# Patient Record
Sex: FEMALE | Race: BLACK OR AFRICAN AMERICAN | NOT HISPANIC OR LATINO | Employment: OTHER | URBAN - METROPOLITAN AREA
[De-identification: names, ages, dates, MRNs, and addresses within clinical notes are randomized per-mention and may not be internally consistent; named-entity substitution may affect disease eponyms.]

---

## 2020-12-09 ENCOUNTER — HOSPITAL ENCOUNTER (OUTPATIENT)
Dept: ADMINISTRATIVE | Facility: HOSPITAL | Age: 85
End: 2020-12-10
Attending: INTERNAL MEDICINE | Admitting: INTERNAL MEDICINE

## 2020-12-10 LAB
ABS NEUT (OLG): 3.42 X10(3)/MCL (ref 2.1–9.2)
ALBUMIN SERPL-MCNC: 3.3 GM/DL (ref 3.4–4.8)
ALBUMIN/GLOB SERPL: 1 RATIO (ref 1.1–2)
ALP SERPL-CCNC: 69 UNIT/L (ref 40–150)
ALT SERPL-CCNC: 12 UNIT/L (ref 0–55)
AST SERPL-CCNC: 18 UNIT/L (ref 5–34)
BASOPHILS # BLD AUTO: 0 X10(3)/MCL (ref 0–0.2)
BASOPHILS NFR BLD AUTO: 1 %
BILIRUB SERPL-MCNC: 0.7 MG/DL
BILIRUBIN DIRECT+TOT PNL SERPL-MCNC: 0.3 MG/DL (ref 0–0.5)
BILIRUBIN DIRECT+TOT PNL SERPL-MCNC: 0.4 MG/DL (ref 0–0.8)
BUN SERPL-MCNC: 13.6 MG/DL (ref 9.8–20.1)
CALCIUM SERPL-MCNC: 8.5 MG/DL (ref 8.4–10.2)
CHLORIDE SERPL-SCNC: 107 MMOL/L (ref 98–107)
CO2 SERPL-SCNC: 23 MMOL/L (ref 23–31)
CREAT SERPL-MCNC: 0.86 MG/DL (ref 0.55–1.02)
EOSINOPHIL # BLD AUTO: 0 X10(3)/MCL (ref 0–0.9)
EOSINOPHIL NFR BLD AUTO: 1 %
ERYTHROCYTE [DISTWIDTH] IN BLOOD BY AUTOMATED COUNT: 15.5 % (ref 11.5–17)
GLOBULIN SER-MCNC: 3.2 GM/DL (ref 2.4–3.5)
GLUCOSE SERPL-MCNC: 99 MG/DL (ref 82–115)
HCT VFR BLD AUTO: 38 % (ref 37–47)
HGB BLD-MCNC: 11.4 GM/DL (ref 12–16)
LYMPHOCYTES # BLD AUTO: 0.7 X10(3)/MCL (ref 0.6–4.6)
LYMPHOCYTES NFR BLD AUTO: 15 %
MCH RBC QN AUTO: 27.7 PG (ref 27–31)
MCHC RBC AUTO-ENTMCNC: 30 GM/DL (ref 33–36)
MCV RBC AUTO: 92.2 FL (ref 80–94)
MONOCYTES # BLD AUTO: 0.4 X10(3)/MCL (ref 0.1–1.3)
MONOCYTES NFR BLD AUTO: 8 %
NEUTROPHILS # BLD AUTO: 3.42 X10(3)/MCL (ref 2.1–9.2)
NEUTROPHILS NFR BLD AUTO: 75 %
PLATELET # BLD AUTO: 112 X10(3)/MCL (ref 130–400)
PMV BLD AUTO: 11.1 FL (ref 9.4–12.4)
POTASSIUM SERPL-SCNC: 3.6 MMOL/L (ref 3.5–5.1)
PROT SERPL-MCNC: 6.5 GM/DL (ref 5.8–7.6)
RBC # BLD AUTO: 4.12 X10(6)/MCL (ref 4.2–5.4)
SODIUM SERPL-SCNC: 138 MMOL/L (ref 136–145)
WBC # SPEC AUTO: 4.6 X10(3)/MCL (ref 4.5–11.5)

## 2021-02-12 ENCOUNTER — HISTORICAL (OUTPATIENT)
Dept: ADMINISTRATIVE | Facility: HOSPITAL | Age: 86
End: 2021-02-12

## 2021-02-12 LAB
ABS NEUT (OLG): 4.61 X10(3)/MCL (ref 2.1–9.2)
ANION GAP SERPL CALC-SCNC: 15 MMOL/L
BASOPHILS # BLD AUTO: 0 X10(3)/MCL (ref 0–0.2)
BASOPHILS NFR BLD AUTO: 0.3 %
BUN SERPL-MCNC: 20 MG/DL (ref 8–26)
CHLORIDE SERPL-SCNC: 101 MMOL/L (ref 98–109)
CREAT SERPL-MCNC: 1 MG/DL (ref 0.6–1.3)
EOSINOPHIL # BLD AUTO: 0 X10(3)/MCL (ref 0–0.9)
EOSINOPHIL NFR BLD AUTO: 0.7 %
ERYTHROCYTE [DISTWIDTH] IN BLOOD BY AUTOMATED COUNT: 17.1 % (ref 11.5–17)
GLUCOSE SERPL-MCNC: 97 MG/DL (ref 70–105)
HCT VFR BLD AUTO: 33.4 % (ref 37–47)
HCT VFR BLD CALC: 36 % (ref 38–51)
HGB BLD-MCNC: 10.2 GM/DL (ref 12–16)
HGB BLD-MCNC: 12.2 MG/DL (ref 12–17)
LYMPHOCYTES # BLD AUTO: 2 X10(3)/MCL (ref 0.6–4.6)
LYMPHOCYTES NFR BLD AUTO: 27 %
MCH RBC QN AUTO: 27.2 PG (ref 27–31)
MCHC RBC AUTO-ENTMCNC: 30.5 GM/DL (ref 33–36)
MCV RBC AUTO: 89.1 FL (ref 80–94)
MONOCYTES # BLD AUTO: 0.5 X10(3)/MCL (ref 0.1–1.3)
MONOCYTES NFR BLD AUTO: 7 %
NEUTROPHILS # BLD AUTO: 4.6 X10(3)/MCL (ref 2.1–9.2)
NEUTROPHILS NFR BLD AUTO: 63.6 %
PLATELET # BLD AUTO: 198 X10(3)/MCL (ref 130–400)
PMV BLD AUTO: 10.4 FL (ref 9.4–12.4)
POC IONIZED CALCIUM: 1.07 MMOL/L (ref 1.12–1.32)
POC TCO2: 28 MMOL/L (ref 24–29)
POTASSIUM BLD-SCNC: 3.7 MMOL/L (ref 3.5–4.9)
RBC # BLD AUTO: 3.75 X10(6)/MCL (ref 4.2–5.4)
SODIUM BLD-SCNC: 138 MMOL/L (ref 138–146)
WBC # SPEC AUTO: 7.2 X10(3)/MCL (ref 4.5–11.5)

## 2021-03-04 ENCOUNTER — HISTORICAL (OUTPATIENT)
Dept: ADMINISTRATIVE | Facility: HOSPITAL | Age: 86
End: 2021-03-04

## 2021-03-04 LAB
ABS NEUT (OLG): 2.57 X10(3)/MCL (ref 2.1–9.2)
BASOPHILS # BLD AUTO: 0 X10(3)/MCL (ref 0–0.2)
BASOPHILS NFR BLD AUTO: 0.7 %
EOSINOPHIL # BLD AUTO: 0 X10(3)/MCL (ref 0–0.9)
EOSINOPHIL NFR BLD AUTO: 1.1 %
ERYTHROCYTE [DISTWIDTH] IN BLOOD BY AUTOMATED COUNT: 16.6 % (ref 11.5–17)
HCT VFR BLD AUTO: 37.6 % (ref 37–47)
HGB BLD-MCNC: 11.2 GM/DL (ref 12–16)
LYMPHOCYTES # BLD AUTO: 1.4 X10(3)/MCL (ref 0.6–4.6)
LYMPHOCYTES NFR BLD AUTO: 32.1 %
MCH RBC QN AUTO: 27.6 PG (ref 27–31)
MCHC RBC AUTO-ENTMCNC: 29.8 GM/DL (ref 33–36)
MCV RBC AUTO: 92.6 FL (ref 80–94)
MONOCYTES # BLD AUTO: 0.4 X10(3)/MCL (ref 0.1–1.3)
MONOCYTES NFR BLD AUTO: 8.1 %
NEUTROPHILS # BLD AUTO: 2.6 X10(3)/MCL (ref 2.1–9.2)
NEUTROPHILS NFR BLD AUTO: 57.8 %
PLATELET # BLD AUTO: 155 X10(3)/MCL (ref 130–400)
PMV BLD AUTO: 10.2 FL (ref 9.4–12.4)
RBC # BLD AUTO: 4.06 X10(6)/MCL (ref 4.2–5.4)
WBC # SPEC AUTO: 4.4 X10(3)/MCL (ref 4.5–11.5)

## 2022-04-30 NOTE — PROGRESS NOTES
Patient:   Porsha Horan            MRN: [id number]            FIN: [id number]               Age:   85 years     Sex:  Female     :  1935   Associated Diagnoses:   Drug-induced thrombocytopenia   Author:   Marilyn Mcneill MD      Cardiologist: Dr. Basil Davies    Drug Induced Thrombocytopenia--Diagnosed 21    Platelets:  12/10/20--112  21--180  21--1  21--7  21--52  21--198  21--155    Treatment history:  21--Transfused 1 unit SDP  21--Transfused 1 unit SDP  Prednisone 60mg daily started 21--weaned and stopped.    Current treatment: Observation      Visit Information   Visit type:  Scheduled follow-up.    Accompanied by:  daughter.       Chief Complaint   3/4/2021 14:56 CST       No c/c        Interval History   Current complaint.   Patient presents for hospital follow-up of drug-induced thrombocytopenia. She is now off steroids and platelets remain WNL. She denies any bleeding. Continues to c/o foot pain from procedure. She asked for pain medication but I have instructed her to get back with cardiologist to check on the pain.       Review of Systems   Constitutional:  No fever, No chills, No weakness, No fatigue.    Eye:  No recent visual problem.    Ear/Nose/Mouth/Throat:  No decreased hearing, No nasal congestion, No sore throat.    Respiratory:  No shortness of breath, No cough, No wheezing.    Cardiovascular:  No chest pain, No palpitations, No peripheral edema.    Gastrointestinal:  No nausea, No vomiting, No diarrhea, No constipation, No abdominal pain.    Hematology/Lymphatics:  Bruising tendency, No bleeding tendency.    Musculoskeletal:  right ankle pain, No back pain, No joint pain.    Integumentary:  No rash, No skin lesion.    Neurologic:  No confusion, No headache.    Psychiatric:  No anxiety, No depression.    All other systems are negative      Health Status   Allergies:    Allergies (1)  Active Reaction  Aggrastat Thrombocytopenia     Current medications:  (Selected)   Prescriptions  Prescribed  Plavix 75 mg oral tablet: 75 mg = 1 tab(s), Oral, Daily, # 90 tab(s), 4 Refill(s), Pharmacy: [facility], 165, cm, Height/Length Dosing, 02/04/21 10:25:00 CST, 103.5, kg, Weight Dosing, 02/04/21 10:25:00 CST  Protonix 40 mg ORAL enteric coated tablet: 40 mg = 1 tab(s), Oral, Daily, # 90 tab(s), 4 Refill(s), Pharmacy: [facility], 165, cm, Height/Length Dosing, 02/04/21 10:25:00 CST, 103.5, kg, Weight Dosing, 02/04/21 10:25:00 CST  Toprol-XL 25 mg oral tablet, extended release: 25 mg = 1 tab(s), Oral, Daily, # 90 tab(s), 4 Refill(s), Pharmacy: [facility], 165, cm, Height/Length Dosing, 02/04/21 10:25:00 CST, 103.5, kg, Weight Dosing, 02/04/21 10:25:00 CST  aspirin 81 mg oral Delayed Release (EC) tablet: 81 mg = 1 tab(s), Oral, Daily, # 90 tab(s), 4 Refill(s), Pharmacy: [facility], 165, cm, Height/Length Dosing, 02/04/21 10:25:00 CST, 103.5, kg, Weight Dosing, 02/04/21 10:25:00 CST  prednisONE 20 mg oral tablet: 60 mg = 3 tab(s), Oral, Daily, # 90 tab(s), 0 Refill(s), Pharmacy: [facility], 165, cm, Height/Length Dosing, 02/04/21 10:25:00 CST, 103.5, kg, Weight Dosing, 02/04/21 10:25:00 CST  Documented Medications  Documented  Actos 30 mg oral tablet: 30 mg = 1 tab(s), Oral, Daily, # 30 tab(s), 0 Refill(s)  atorvastatin 40 mg oral tablet: 40 mg = 1 tab(s), Oral, Daily  enalapril 20 mg oral tablet: 20 mg = 1 tab(s), Oral, Daily  gabapentin 600 mg oral tablet: 600 mg = 1 tab(s), Oral, TID  hydrochlorothiazide 25 mg oral tablet: 25 mg = 1 tab(s), Oral, Daily  levothyroxine 25 mcg (0.025 mg) oral tablet: 25 mcg = 1 tab(s), Oral, qAM  omeprazole 20 mg oral DR capsule: 20 mg = 1 cap(s), Oral, Daily, 0 Refill(s)  zonisamide 100 mg oral capsule: 100 mg = 1 cap(s), Oral, Daily, 0 Refill(s)    Problem list:    Active Problems (6)  Carotid artery stenosis   Diabetes mellitus type 2   Drug-induced thrombocytopenia   Essential hypertension   HLD - Hyperlipidemia   PAD - Peripheral arterial disease         Histories   Past Medical History:    Active  Essential hypertension (68993161)  Diabetes mellitus type 2 (126819946)  PAD - Peripheral arterial disease (145398941780465)  HLD - Hyperlipidemia (974089582)  Carotid artery stenosis (929522173)   Family History:    Pancreatic cancer  Mother  Cancer....  Brother  Sister     Procedure history:    Peripheral Angiogram- Laser/PTA/Stent to Left SFA on 12/9/2020 at 85 Years.  Partial hysterectomy (6624934416).   Social History        Social & Psychosocial Habits    Alcohol  02/04/2021  Use: Never    Home/Environment  02/12/2021  Lives with: Spouse    Substance Use  02/04/2021  Use: Never    Tobacco  02/12/2021  Use: Former smoker, quit more    Patient Wants Consult For Cessation Counseling N/A    Comment: Quit smoking approximately 20 years ago. - 02/12/2021 11:01 - Martín Starr LPN    Abuse/Neglect  02/12/2021  SHX Any signs of abuse or neglect No    Feels unsafe at home: No    Safe place to go: Yes    Spiritual/Cultural  12/09/2020  Holiness Preference Jehovah's witness  .        Physical Examination      Vital Signs (last 24 hrs)_____  Last Charted___________  Temp Oral     98.5 degF  (MAR 04 14:52)  Heart Rate Peripheral   66 bpm  (MAR 04 14:52)  Resp Rate         20 br/min  (MAR 04 14:52)  SBP      138 mmHg  (MAR 04 14:52)  DBP      86 mmHg  (MAR 04 14:52)  SpO2      98 %  (MAR 04 14:55)  Weight      104.9 kg  (MAR 04 14:52)  Height      163 cm  (MAR 04 14:52)  BMI      39.48  (MAR 04 14:52)     General:  Alert and oriented, No acute distress, elderly black female.    Eye:  Vision unchanged.    HENT:  Normocephalic, Normal hearing, Oral mucosa is moist.    Neck:  Supple, No jugular venous distention, No lymphadenopathy, No thyromegaly.    Respiratory:  Lungs are  clear to auscultation, Breath sounds are equal.    Cardiovascular:  Normal rate, Regular rhythm, No murmur, No gallop, Normal peripheral perfusion, No edema.    Gastrointestinal:  Soft, Non-tender, Non-distended, Normal bowel sounds, No organomegaly.    Lymphatics:  No lymphadenopathy neck, axilla, groin.    Musculoskeletal:  Normal range of motion, Normal strength, No deformity, Normal gait.    Integumentary:  Warm, Intact.    Neurologic:  Alert, Oriented, Normal sensory, Normal motor function, in a wheelchair today, uses a cane to walk.    Psychiatric:  Cooperative, Appropriate mood & affect.       Review / Management   Laboratory Results   Today's Lab Results : PowerNote Discrete Results   3/4/2021 14:34 CST       WBC                       4.4 x10(3)/mcL  LOW                             RBC                       4.06 x10(6)/mcL  LOW                             Hgb                       11.2 gm/dL  LOW                             Hct                       37.6 %                             Platelet                  155 x10(3)/mcL                             MCV                       92.6 fL                             MCH                       27.6 pg                             MCHC                      29.8 gm/dL  LOW                             RDW                       16.6 %                             MPV                       10.2 fL                             Abs Neut                  2.57 x10(3)/mcL                             NEUT%                     57.8 %  NA                             NEUT#                     2.6 x10(3)/mcL                             LYMPH%                    32.1 %  NA                             LYMPH#                    1.4 x10(3)/mcL                             MONO%                     8.1 %  NA                             MONO#                     0.4 x10(3)/mcL                             EOS%                      1.1 %  NA                             EOS#                       0.0 x10(3)/mcL                             BASO%                     0.7 %  NA                             BASO#                     0.0 x10(3)/mcL           Impression and Plan   Diagnosis     Drug-induced thrombocytopenia ([id number] D69.59).     Plan   Patient with severe thrombocytopenia, appears to be drug-induced at this time from Aggrastat s/p stent placement for PVD.  Further work-up negative for other causes. Unclear what her platelet count was immediately prior to procedure.  Platelet count in 12/2020 was 112K and on 1/8/21 192K. She may have a baseline chronic ITP as well.  s/p platelet transfusion X 2.    Prednisone 60mg daily started on 2/5/21--weaned and stopped after platelets returned to normal.  Remains on ASA and Plavix for recent peripheral stent placement.    CBCdiff today show platelets remain  and anemia also improved, near normal.  Would recommend NO Aggrastat ever again and list as an allergy.    RTC PRN.    All questions answered at this time.    Marilyn Mcneill MD

## 2022-04-30 NOTE — PROGRESS NOTES
Patient:   Porsha Horan            MRN: [id number]            FIN: [id number]               Age:   85 years     Sex:  Female     :  1935   Associated Diagnoses:   Drug-induced thrombocytopenia   Author:   Marilyn Mcneill MD      Cardiologist: Dr. Basil Davies    Drug Induced Thrombocytopenia--Diagnosed 21    Platelets:  12/10/20--112  21--180  21--1  21--7  21--52  21--198    Treatment history:  21--Transfused 1 unit SDP  21--Transfused 1 unit SDP    Current treatment: Prednisone 60mg daily started 21      Visit Information   Visit type:  Scheduled follow-up.    Accompanied by:  daughter.       Chief Complaint   2021 10:37 CST      R ankle pain        Interval History   Current complaint.   Patient presents for hospital follow-up of drug-induced thrombocytopenia. She remains on Prednisone 60mg daily. She reports still having some mild right ankle pain. Bruising is better. No other new problems. Platelets today are normal and anemia improved.      Review of Systems   Constitutional:  No fever, No chills, No weakness, No fatigue.    Eye:  No recent visual problem.    Ear/Nose/Mouth/Throat:  No decreased hearing, No nasal congestion, No sore throat.    Respiratory:  No shortness of breath, No cough, No wheezing.    Cardiovascular:  No chest pain, No palpitations, No peripheral edema.    Gastrointestinal:  No nausea, No vomiting, No diarrhea, No constipation, No abdominal pain.    Hematology/Lymphatics:  Bruising tendency, No bleeding tendency.    Musculoskeletal:  right ankle pain, No back pain, No joint pain.    Integumentary:  No rash, No skin lesion.    Neurologic:  No confusion, No headache.    Psychiatric:  No anxiety, No depression.    All other systems are negative      Health Status   Allergies:    Allergies (1) Active Reaction  Aggrastat Thrombocytopenia     Current medications:  (Selected)   Prescriptions  Prescribed  Plavix 75 mg oral  tablet: 75 mg = 1 tab(s), Oral, Daily, # 90 tab(s), 4 Refill(s), Pharmacy: [facility], 165, cm, Height/Length Dosing, 02/04/21 10:25:00 CST, 103.5, kg, Weight Dosing, 02/04/21 10:25:00 CST  Protonix 40 mg ORAL enteric coated tablet: 40 mg = 1 tab(s), Oral, Daily, # 90 tab(s), 4 Refill(s), Pharmacy: [facility], 165, cm, Height/Length Dosing, 02/04/21 10:25:00 CST, 103.5, kg, Weight Dosing, 02/04/21 10:25:00 CST  Toprol-XL 25 mg oral tablet, extended release: 25 mg = 1 tab(s), Oral, Daily, # 90 tab(s), 4 Refill(s), Pharmacy: [facility], 165, cm, Height/Length Dosing, 02/04/21 10:25:00 CST, 103.5, kg, Weight Dosing, 02/04/21 10:25:00 CST  aspirin 81 mg oral Delayed Release (EC) tablet: 81 mg = 1 tab(s), Oral, Daily, # 90 tab(s), 4 Refill(s), Pharmacy: [facility], 165, cm, Height/Length Dosing, 02/04/21 10:25:00 CST, 103.5, kg, Weight Dosing, 02/04/21 10:25:00 CST  prednisONE 20 mg oral tablet: 60 mg = 3 tab(s), Oral, Daily, # 90 tab(s), 0 Refill(s), Pharmacy: [facility], 165, cm, Height/Length Dosing, 02/04/21 10:25:00 CST, 103.5, kg, Weight Dosing, 02/04/21 10:25:00 CST  Documented Medications  Documented  atorvastatin 40 mg oral tablet: 40 mg = 1 tab(s), Oral, Daily  enalapril 20 mg oral tablet: 20 mg = 1 tab(s), Oral, Daily  gabapentin 600 mg oral tablet: 600 mg = 1 tab(s), Oral, TID  hydrochlorothiazide 25 mg oral tablet: 25 mg = 1 tab(s), Oral, Daily  levothyroxine 25 mcg (0.025 mg) oral tablet: 25 mcg = 1 tab(s), Oral, qAM  traMADol 50 mg oral tablet: 1 tab, Oral, BID   Problem list:    Active Problems (5)  Carotid artery stenosis   Diabetes mellitus type 2   Essential hypertension   HLD - Hyperlipidemia   PAD - Peripheral arterial disease         Histories   Past Medical History:    Active  Essential hypertension (47224956)  Diabetes mellitus type 2 (277312030)  PAD - Peripheral  arterial disease ([id number])  HLD - Hyperlipidemia (868051823)  Carotid artery stenosis (663857271)   Family History:    No family history items have been selected or recorded.   Procedure history:    Peripheral Angiogram- Laser/PTA/Stent to Left SFA on 12/9/2020 at 85 Years.  Partial hysterectomy (7364127834).   Social History        Social & Psychosocial Habits    Alcohol  02/04/2021  Use: Never    Substance Use  02/04/2021  Use: Never    Tobacco  02/04/2021  Use: Former smoker, quit more    Patient Wants Consult For Cessation Counseling N/A    Abuse/Neglect  02/04/2021  SHX Any signs of abuse or neglect No    Feels unsafe at home: No    Safe place to go: Yes    Spiritual/Cultural  12/09/2020  Jehovah's witness Preference Taoist  .        Physical Examination   Vital Signs   2/12/2021 10:37 CST      Temperature Oral          37.3 DegC                             Temperature Oral (calculated)             99.14 DegF                             Peripheral Pulse Rate     82 bpm                             SpO2                      99 %                             Systolic Blood Pressure   169 mmHg  HI                             Diastolic Blood Pressure  77 mmHg     General:  Alert and oriented, No acute distress, elderly black female.    Eye:  Vision unchanged.    HENT:  Normocephalic, Normal hearing, Oral mucosa is moist.    Neck:  Supple, No jugular venous distention, No lymphadenopathy, No thyromegaly.    Respiratory:  Lungs are clear to auscultation, Breath sounds are equal.    Cardiovascular:  Normal rate, Regular rhythm, No murmur, No gallop, Normal peripheral perfusion, No edema.    Gastrointestinal:  Soft, Non-tender, Non-distended, Normal bowel sounds, No organomegaly.    Lymphatics:  No lymphadenopathy neck, axilla, groin.    Musculoskeletal:  Normal range of motion, Normal strength, No deformity, Normal gait.    Integumentary:  Warm, Intact, scattered bruising on arms, mild bruising to right ankle.     Neurologic:  Alert, Oriented, Normal sensory, Normal motor function, in a wheelchair today, uses a cane to walk.    Psychiatric:  Cooperative, Appropriate mood & affect.       Review / Management   Laboratory Results   Today's Lab Results : PowerNote Discrete Results   2/12/2021 11:00 CST      Est Creat Clearance Ser   36.43 mL/min    2/12/2021 10:57 CST      POC TCO2                  28.0 mmol/L                             POC Hb                    12.2 mg/dL                             POC Hct                   36.0 %  LOW                             POC Sodium                138 mmol/L                             POC Potassium             3.7 mmol/L                             POC Chloride              101 mmol/L                             POC Ion Calcium           1.07 mmol/L  LOW                             POC Glucose               97 mg/dL                             POC BUN                   20.0 mg/dL                             POC Creatinine            1.0 mg/dL                             POC AGAP                  15.0  NA    2/12/2021 10:50 CST      WBC                       7.2 x10(3)/mcL                             RBC                       3.75 x10(6)/mcL  LOW                             Hgb                       10.2 gm/dL  LOW                             Hct                       33.4 %  LOW                             Platelet                  198 x10(3)/mcL                             MCV                       89.1 fL                             MCH                       27.2 pg                             MCHC                      30.5 gm/dL  LOW                             RDW                       17.1 %  HI                             MPV                       10.4 fL                             Abs Neut                  4.61 x10(3)/mcL                             NEUT%                     63.6 %  NA                             NEUT#                     4.6 x10(3)/mcL                              LYMPH%                    27.0 %  NA                             LYMPH#                    2.0 x10(3)/mcL                             MONO%                     7.0 %  NA                             MONO#                     0.5 x10(3)/mcL                             EOS%                      0.7 %  NA                             EOS#                      0.0 x10(3)/mcL                             BASO%                     0.3 %  NA                             BASO#                     0.0 x10(3)/mcL           Impression and Plan   Diagnosis     Drug-induced thrombocytopenia ([id number] D69.59).     Plan   Patient with severe thrombocytopenia, appears to be drug-induced at this time from Aggrastat s/p stent placement for PVD.  Further work-up negative for other causes. Unclear what her platelet count was immediately prior to procedure.  Platelet count in 12/2020 was 112K and on 1/8/21 192K. She may have a baseline chronic ITP as well.  s/p platelet transfusion X 2.  Prednisone 60mg daily started on 2/5/21.  Remains on ASA and Plavix for recent peripheral stent placement.    CBCdiff today with normal platelets 198 and improved anemia.  Wean Prednisone 40mg daily X 2 days, 20mg daily X 2 days, 10mg X 2 days then stop.    RTC 2 weeks for follow-up with repeat CBCdiff.   Will likely release from clinic if platelets still good.  Would recommend NO Aggrastat ever again and list as an allergy.    All questions answered at this time.    Marilyn Mcneill MD

## 2022-07-11 ENCOUNTER — HOSPITAL ENCOUNTER (INPATIENT)
Facility: HOSPITAL | Age: 87
LOS: 6 days | Discharge: HOME | DRG: 280 | End: 2022-07-17
Attending: EMERGENCY MEDICINE | Admitting: INTERNAL MEDICINE
Payer: MEDICARE

## 2022-07-11 DIAGNOSIS — R07.9 CHEST PAIN: ICD-10-CM

## 2022-07-11 DIAGNOSIS — I21.4 NSTEMI (NON-ST ELEVATED MYOCARDIAL INFARCTION): ICD-10-CM

## 2022-07-11 DIAGNOSIS — J18.9 PNEUMONIA OF RIGHT LOWER LOBE DUE TO INFECTIOUS ORGANISM: ICD-10-CM

## 2022-07-11 DIAGNOSIS — D64.89 OTHER SPECIFIED ANEMIAS: ICD-10-CM

## 2022-07-11 DIAGNOSIS — R06.00 DYSPNEA: ICD-10-CM

## 2022-07-11 DIAGNOSIS — J81.0 FLASH PULMONARY EDEMA: ICD-10-CM

## 2022-07-11 DIAGNOSIS — I50.9 CONGESTIVE CARDIAC FAILURE: ICD-10-CM

## 2022-07-11 DIAGNOSIS — I50.40 HEART FAILURE WITH REDUCED EJECTION FRACTION AND DIASTOLIC DYSFUNCTION: Primary | ICD-10-CM

## 2022-07-11 PROBLEM — E11.9 TYPE 2 DIABETES MELLITUS: Status: ACTIVE | Noted: 2022-07-11

## 2022-07-11 PROBLEM — I65.29 STENOSIS OF CAROTID ARTERY: Status: ACTIVE | Noted: 2022-07-11

## 2022-07-11 PROBLEM — I16.1 HYPERTENSIVE EMERGENCY: Status: ACTIVE | Noted: 2022-07-11

## 2022-07-11 PROBLEM — I77.9 PERIPHERAL ARTERIAL OCCLUSIVE DISEASE: Status: ACTIVE | Noted: 2022-07-11

## 2022-07-11 PROBLEM — I10 PRIMARY HYPERTENSION: Status: ACTIVE | Noted: 2022-07-11

## 2022-07-11 PROBLEM — E78.5 HYPERLIPIDEMIA: Status: ACTIVE | Noted: 2022-07-11

## 2022-07-11 LAB
ALBUMIN SERPL-MCNC: 3.3 GM/DL (ref 3.4–4.8)
ALBUMIN/GLOB SERPL: 1 RATIO (ref 1.1–2)
ALP SERPL-CCNC: 76 UNIT/L (ref 40–150)
ALT SERPL-CCNC: 19 UNIT/L (ref 0–55)
APPEARANCE UR: ABNORMAL
APTT PPP: 75.5 SECONDS (ref 23.2–33.7)
AST SERPL-CCNC: 19 UNIT/L (ref 5–34)
AV INDEX (PROSTH): 0.72
AV MEAN GRADIENT: 3 MMHG
AV PEAK GRADIENT: 6 MMHG
AV VALVE AREA: 2.26 CM2
AV VELOCITY RATIO: 0.74
BACTERIA #/AREA URNS AUTO: ABNORMAL /HPF
BASOPHILS # BLD AUTO: 0.04 X10(3)/MCL (ref 0–0.2)
BASOPHILS NFR BLD AUTO: 0.5 %
BILIRUB UR QL STRIP.AUTO: NEGATIVE MG/DL
BILIRUBIN DIRECT+TOT PNL SERPL-MCNC: 0.5 MG/DL
BNP BLD-MCNC: 290.4 PG/ML
BUN SERPL-MCNC: 19.7 MG/DL (ref 9.8–20.1)
CALCIUM SERPL-MCNC: 8.8 MG/DL (ref 8.4–10.2)
CHLORIDE SERPL-SCNC: 113 MMOL/L (ref 98–107)
CO2 SERPL-SCNC: 22 MMOL/L (ref 23–31)
COLOR UR AUTO: ABNORMAL
CREAT SERPL-MCNC: 0.9 MG/DL (ref 0.55–1.02)
CV ECHO LV RWT: 0.4 CM
DOP CALC AO PEAK VEL: 1.21 M/S
DOP CALC AO VTI: 21.4 CM
DOP CALC LVOT AREA: 3.1 CM2
DOP CALC LVOT DIAMETER: 2 CM
DOP CALC LVOT PEAK VEL: 0.89 M/S
DOP CALC LVOT STROKE VOLUME: 48.36 CM3
DOP CALC MV VTI: 27.9 CM
DOP CALCLVOT PEAK VEL VTI: 15.4 CM
E WAVE DECELERATION TIME: 177 MSEC
E/A RATIO: 1.27
E/E' RATIO: 29.56 M/S
ECHO LV POSTERIOR WALL: 1 CM (ref 0.6–1.1)
EJECTION FRACTION: 25 %
EOSINOPHIL # BLD AUTO: 0.11 X10(3)/MCL (ref 0–0.9)
EOSINOPHIL NFR BLD AUTO: 1.4 %
ERYTHROCYTE [DISTWIDTH] IN BLOOD BY AUTOMATED COUNT: 17.1 % (ref 11.5–17)
FRACTIONAL SHORTENING: 37 % (ref 28–44)
GLOBULIN SER-MCNC: 3.3 GM/DL (ref 2.4–3.5)
GLUCOSE SERPL-MCNC: 190 MG/DL (ref 82–115)
GLUCOSE UR QL STRIP.AUTO: NEGATIVE MG/DL
HCT VFR BLD AUTO: 42.1 % (ref 37–47)
HGB BLD-MCNC: 12.6 GM/DL (ref 12–16)
IMM GRANULOCYTES # BLD AUTO: 0.05 X10(3)/MCL (ref 0–0.04)
IMM GRANULOCYTES NFR BLD AUTO: 0.6 %
INR BLD: 0.95 (ref 0–1.3)
INTERVENTRICULAR SEPTUM: 1 CM (ref 0.6–1.1)
KETONES UR QL STRIP.AUTO: NEGATIVE MG/DL
LEFT ATRIUM SIZE: 3.6 CM
LEFT ATRIUM VOLUME MOD: 76.7 CM3
LEFT INTERNAL DIMENSION IN SYSTOLE: 3.16 CM (ref 2.1–4)
LEFT VENTRICLE DIASTOLIC VOLUME: 93.9 ML
LEFT VENTRICLE SYSTOLIC VOLUME: 39.7 ML
LEFT VENTRICULAR INTERNAL DIMENSION IN DIASTOLE: 5 CM (ref 3.5–6)
LEFT VENTRICULAR MASS: 181.98 G
LEUKOCYTE ESTERASE UR QL STRIP.AUTO: 2 UNIT/L
LV LATERAL E/E' RATIO: 26.6 M/S
LV SEPTAL E/E' RATIO: 33.25 M/S
LVOT MG: 2 MMHG
LVOT MV: 0.59 CM/S
LYMPHOCYTES # BLD AUTO: 2.91 X10(3)/MCL (ref 0.6–4.6)
LYMPHOCYTES NFR BLD AUTO: 35.8 %
MCH RBC QN AUTO: 26.9 PG (ref 27–31)
MCHC RBC AUTO-ENTMCNC: 29.9 MG/DL (ref 33–36)
MCV RBC AUTO: 90 FL (ref 80–94)
MONOCYTES # BLD AUTO: 0.42 X10(3)/MCL (ref 0.1–1.3)
MONOCYTES NFR BLD AUTO: 5.2 %
MV MEAN GRADIENT: 4 MMHG
MV PEAK A VEL: 1.05 M/S
MV PEAK E VEL: 1.33 M/S
MV PEAK GRADIENT: 7 MMHG
MV VALVE AREA BY CONTINUITY EQUATION: 1.73 CM2
NEUTROPHILS # BLD AUTO: 4.6 X10(3)/MCL (ref 2.1–9.2)
NEUTROPHILS NFR BLD AUTO: 56.5 %
NITRITE UR QL STRIP.AUTO: POSITIVE
NRBC BLD AUTO-RTO: 0 %
PH UR STRIP.AUTO: 6 [PH]
PISA TR MAX VEL: 3.3 M/S
PLATELET # BLD AUTO: 252 X10(3)/MCL (ref 130–400)
PMV BLD AUTO: 11.3 FL (ref 7.4–10.4)
POCT GLUCOSE: 196 MG/DL (ref 70–110)
POCT GLUCOSE: 219 MG/DL (ref 70–110)
POTASSIUM SERPL-SCNC: 3.8 MMOL/L (ref 3.5–5.1)
PROT SERPL-MCNC: 6.6 GM/DL (ref 5.8–7.6)
PROT UR QL STRIP.AUTO: 2 MG/DL
PROTHROMBIN TIME: 12.6 SECONDS (ref 12.5–14.5)
RA PRESSURE: 8 MMHG
RBC # BLD AUTO: 4.68 X10(6)/MCL (ref 4.2–5.4)
RBC #/AREA URNS AUTO: ABNORMAL /HPF
RBC UR QL AUTO: ABNORMAL UNIT/L
RIGHT VENTRICULAR END-DIASTOLIC DIMENSION: 3.63 CM
RV MID DIAMA: 2.5 CM
SARS-COV-2 RDRP RESP QL NAA+PROBE: NEGATIVE
SODIUM SERPL-SCNC: 145 MMOL/L (ref 136–145)
SP GR UR STRIP.AUTO: >=1.03 (ref 1–1.03)
SQUAMOUS #/AREA URNS AUTO: ABNORMAL /HPF
TDI LATERAL: 0.05 M/S
TDI SEPTAL: 0.04 M/S
TDI: 0.05 M/S
TR MAX PG: 44 MMHG
TRICUSPID ANNULAR PLANE SYSTOLIC EXCURSION: 1.89 CM
TROPONIN I SERPL-MCNC: 0.02 NG/ML (ref 0–0.04)
TROPONIN I SERPL-MCNC: 0.17 NG/ML (ref 0–0.04)
TROPONIN I SERPL-MCNC: 0.85 NG/ML (ref 0–0.04)
TROPONIN I SERPL-MCNC: 1.74 NG/ML (ref 0–0.04)
TV REST PULMONARY ARTERY PRESSURE: 52 MMHG
UROBILINOGEN UR STRIP-ACNC: 1 MG/DL
WBC # SPEC AUTO: 8.1 X10(3)/MCL (ref 4.5–11.5)
WBC #/AREA URNS AUTO: >100 /HPF

## 2022-07-11 PROCEDURE — 93005 ELECTROCARDIOGRAM TRACING: CPT

## 2022-07-11 PROCEDURE — 20000000 HC ICU ROOM

## 2022-07-11 PROCEDURE — 94761 N-INVAS EAR/PLS OXIMETRY MLT: CPT

## 2022-07-11 PROCEDURE — 99900031 HC PATIENT EDUCATION (STAT)

## 2022-07-11 PROCEDURE — 80053 COMPREHEN METABOLIC PANEL: CPT | Performed by: EMERGENCY MEDICINE

## 2022-07-11 PROCEDURE — 94660 CPAP INITIATION&MGMT: CPT

## 2022-07-11 PROCEDURE — 99900035 HC TECH TIME PER 15 MIN (STAT)

## 2022-07-11 PROCEDURE — 96368 THER/DIAG CONCURRENT INF: CPT

## 2022-07-11 PROCEDURE — 84484 ASSAY OF TROPONIN QUANT: CPT | Performed by: EMERGENCY MEDICINE

## 2022-07-11 PROCEDURE — 25000003 PHARM REV CODE 250: Performed by: NURSE PRACTITIONER

## 2022-07-11 PROCEDURE — 25000003 PHARM REV CODE 250: Performed by: EMERGENCY MEDICINE

## 2022-07-11 PROCEDURE — 25000003 PHARM REV CODE 250: Performed by: STUDENT IN AN ORGANIZED HEALTH CARE EDUCATION/TRAINING PROGRAM

## 2022-07-11 PROCEDURE — 25000242 PHARM REV CODE 250 ALT 637 W/ HCPCS: Performed by: EMERGENCY MEDICINE

## 2022-07-11 PROCEDURE — 27000190 HC CPAP FULL FACE MASK W/VALVE

## 2022-07-11 PROCEDURE — 83880 ASSAY OF NATRIURETIC PEPTIDE: CPT | Performed by: EMERGENCY MEDICINE

## 2022-07-11 PROCEDURE — 85730 THROMBOPLASTIN TIME PARTIAL: CPT | Performed by: NURSE PRACTITIONER

## 2022-07-11 PROCEDURE — 96365 THER/PROPH/DIAG IV INF INIT: CPT

## 2022-07-11 PROCEDURE — 84484 ASSAY OF TROPONIN QUANT: CPT | Performed by: NURSE PRACTITIONER

## 2022-07-11 PROCEDURE — 93010 EKG 12-LEAD: ICD-10-PCS | Mod: 76,,, | Performed by: INTERNAL MEDICINE

## 2022-07-11 PROCEDURE — 63600175 PHARM REV CODE 636 W HCPCS

## 2022-07-11 PROCEDURE — 63600175 PHARM REV CODE 636 W HCPCS: Performed by: NURSE PRACTITIONER

## 2022-07-11 PROCEDURE — 25000003 PHARM REV CODE 250: Performed by: INTERNAL MEDICINE

## 2022-07-11 PROCEDURE — 36415 COLL VENOUS BLD VENIPUNCTURE: CPT | Performed by: EMERGENCY MEDICINE

## 2022-07-11 PROCEDURE — 85025 COMPLETE CBC W/AUTO DIFF WBC: CPT | Performed by: EMERGENCY MEDICINE

## 2022-07-11 PROCEDURE — 27000221 HC OXYGEN, UP TO 24 HOURS

## 2022-07-11 PROCEDURE — 63600175 PHARM REV CODE 636 W HCPCS: Performed by: INTERNAL MEDICINE

## 2022-07-11 PROCEDURE — 63600175 PHARM REV CODE 636 W HCPCS: Performed by: EMERGENCY MEDICINE

## 2022-07-11 PROCEDURE — 87040 BLOOD CULTURE FOR BACTERIA: CPT | Performed by: EMERGENCY MEDICINE

## 2022-07-11 PROCEDURE — 99291 CRITICAL CARE FIRST HOUR: CPT | Mod: 25

## 2022-07-11 PROCEDURE — 87635 SARS-COV-2 COVID-19 AMP PRB: CPT | Performed by: EMERGENCY MEDICINE

## 2022-07-11 PROCEDURE — 93010 ELECTROCARDIOGRAM REPORT: CPT | Mod: ,,, | Performed by: INTERNAL MEDICINE

## 2022-07-11 PROCEDURE — 81001 URINALYSIS AUTO W/SCOPE: CPT | Performed by: STUDENT IN AN ORGANIZED HEALTH CARE EDUCATION/TRAINING PROGRAM

## 2022-07-11 PROCEDURE — 85610 PROTHROMBIN TIME: CPT | Performed by: NURSE PRACTITIONER

## 2022-07-11 PROCEDURE — 93010 ELECTROCARDIOGRAM REPORT: CPT | Mod: 76,,, | Performed by: INTERNAL MEDICINE

## 2022-07-11 PROCEDURE — 96366 THER/PROPH/DIAG IV INF ADDON: CPT

## 2022-07-11 PROCEDURE — 96375 TX/PRO/DX INJ NEW DRUG ADDON: CPT

## 2022-07-11 RX ORDER — IBUPROFEN 200 MG
24 TABLET ORAL
Status: DISCONTINUED | OUTPATIENT
Start: 2022-07-11 | End: 2022-07-17 | Stop reason: HOSPADM

## 2022-07-11 RX ORDER — FUROSEMIDE 10 MG/ML
40 INJECTION INTRAMUSCULAR; INTRAVENOUS 2 TIMES DAILY
Status: DISCONTINUED | OUTPATIENT
Start: 2022-07-11 | End: 2022-07-13

## 2022-07-11 RX ORDER — NITROGLYCERIN 20 MG/100ML
0-400 INJECTION INTRAVENOUS CONTINUOUS
Status: DISCONTINUED | OUTPATIENT
Start: 2022-07-11 | End: 2022-07-15

## 2022-07-11 RX ORDER — ENALAPRILAT 1.25 MG/ML
1.25 INJECTION INTRAVENOUS
Status: COMPLETED | OUTPATIENT
Start: 2022-07-11 | End: 2022-07-11

## 2022-07-11 RX ORDER — NALOXONE HCL 0.4 MG/ML
0.02 VIAL (ML) INJECTION
Status: DISCONTINUED | OUTPATIENT
Start: 2022-07-11 | End: 2022-07-17 | Stop reason: HOSPADM

## 2022-07-11 RX ORDER — NITROGLYCERIN 0.4 MG/1
0.4 TABLET SUBLINGUAL EVERY 5 MIN PRN
Status: DISCONTINUED | OUTPATIENT
Start: 2022-07-11 | End: 2022-07-17 | Stop reason: HOSPADM

## 2022-07-11 RX ORDER — IBUPROFEN 200 MG
16 TABLET ORAL
Status: DISCONTINUED | OUTPATIENT
Start: 2022-07-11 | End: 2022-07-17 | Stop reason: HOSPADM

## 2022-07-11 RX ORDER — NITROGLYCERIN 20 MG/100ML
5 INJECTION INTRAVENOUS CONTINUOUS
Status: DISCONTINUED | OUTPATIENT
Start: 2022-07-11 | End: 2022-07-11

## 2022-07-11 RX ORDER — HYDROCHLOROTHIAZIDE 25 MG/1
25 TABLET ORAL EVERY MORNING
Status: ON HOLD | COMMUNITY
Start: 2022-03-09 | End: 2022-07-15 | Stop reason: HOSPADM

## 2022-07-11 RX ORDER — GABAPENTIN 300 MG/1
600 CAPSULE ORAL 3 TIMES DAILY
Status: DISCONTINUED | OUTPATIENT
Start: 2022-07-11 | End: 2022-07-17 | Stop reason: HOSPADM

## 2022-07-11 RX ORDER — ASPIRIN 81 MG/1
81 TABLET ORAL DAILY
Status: DISCONTINUED | OUTPATIENT
Start: 2022-07-11 | End: 2022-07-17 | Stop reason: HOSPADM

## 2022-07-11 RX ORDER — SODIUM CHLORIDE 0.9 % (FLUSH) 0.9 %
10 SYRINGE (ML) INJECTION EVERY 12 HOURS PRN
Status: DISCONTINUED | OUTPATIENT
Start: 2022-07-11 | End: 2022-07-17 | Stop reason: HOSPADM

## 2022-07-11 RX ORDER — ZONISAMIDE 100 MG/1
100 CAPSULE ORAL NIGHTLY
COMMUNITY
Start: 2022-03-09

## 2022-07-11 RX ORDER — LEVOTHYROXINE SODIUM 25 UG/1
25 TABLET ORAL EVERY MORNING
Status: DISCONTINUED | OUTPATIENT
Start: 2022-07-11 | End: 2022-07-11

## 2022-07-11 RX ORDER — AMLODIPINE BESYLATE 5 MG/1
5 TABLET ORAL DAILY
Status: DISCONTINUED | OUTPATIENT
Start: 2022-07-11 | End: 2022-07-17 | Stop reason: HOSPADM

## 2022-07-11 RX ORDER — NITROGLYCERIN 20 MG/100ML
0-400 INJECTION INTRAVENOUS CONTINUOUS
Status: DISCONTINUED | OUTPATIENT
Start: 2022-07-11 | End: 2022-07-11

## 2022-07-11 RX ORDER — PANTOPRAZOLE SODIUM 40 MG/1
40 TABLET, DELAYED RELEASE ORAL DAILY
COMMUNITY
Start: 2022-05-04

## 2022-07-11 RX ORDER — HYDROCHLOROTHIAZIDE 25 MG/1
25 TABLET ORAL EVERY MORNING
Status: DISCONTINUED | OUTPATIENT
Start: 2022-07-11 | End: 2022-07-11

## 2022-07-11 RX ORDER — INSULIN ASPART 100 [IU]/ML
0-5 INJECTION, SOLUTION INTRAVENOUS; SUBCUTANEOUS
Status: DISCONTINUED | OUTPATIENT
Start: 2022-07-11 | End: 2022-07-17 | Stop reason: HOSPADM

## 2022-07-11 RX ORDER — CLOPIDOGREL BISULFATE 75 MG/1
75 TABLET ORAL DAILY
Status: DISCONTINUED | OUTPATIENT
Start: 2022-07-11 | End: 2022-07-11

## 2022-07-11 RX ORDER — PANTOPRAZOLE SODIUM 40 MG/1
40 TABLET, DELAYED RELEASE ORAL DAILY
Status: DISCONTINUED | OUTPATIENT
Start: 2022-07-11 | End: 2022-07-17 | Stop reason: HOSPADM

## 2022-07-11 RX ORDER — LEVOTHYROXINE SODIUM 25 UG/1
25 TABLET ORAL EVERY MORNING
COMMUNITY
Start: 2022-06-03

## 2022-07-11 RX ORDER — ONDANSETRON 2 MG/ML
4 INJECTION INTRAMUSCULAR; INTRAVENOUS
Status: COMPLETED | OUTPATIENT
Start: 2022-07-11 | End: 2022-07-11

## 2022-07-11 RX ORDER — ENOXAPARIN SODIUM 100 MG/ML
40 INJECTION SUBCUTANEOUS EVERY 24 HOURS
Status: DISCONTINUED | OUTPATIENT
Start: 2022-07-11 | End: 2022-07-11

## 2022-07-11 RX ORDER — ASPIRIN 81 MG/1
81 TABLET ORAL DAILY
COMMUNITY

## 2022-07-11 RX ORDER — ATORVASTATIN CALCIUM 40 MG/1
40 TABLET, FILM COATED ORAL NIGHTLY
Status: DISCONTINUED | OUTPATIENT
Start: 2022-07-11 | End: 2022-07-17 | Stop reason: HOSPADM

## 2022-07-11 RX ORDER — ZONISAMIDE 100 MG/1
100 CAPSULE ORAL NIGHTLY
Status: DISCONTINUED | OUTPATIENT
Start: 2022-07-11 | End: 2022-07-17 | Stop reason: HOSPADM

## 2022-07-11 RX ORDER — ASPIRIN 325 MG
325 TABLET ORAL
Status: COMPLETED | OUTPATIENT
Start: 2022-07-11 | End: 2022-07-11

## 2022-07-11 RX ORDER — CLOPIDOGREL BISULFATE 75 MG/1
75 TABLET ORAL DAILY
COMMUNITY
Start: 2022-03-09

## 2022-07-11 RX ORDER — ATORVASTATIN CALCIUM 40 MG/1
TABLET, FILM COATED ORAL
COMMUNITY
Start: 2022-03-09

## 2022-07-11 RX ORDER — LEVOCETIRIZINE DIHYDROCHLORIDE 5 MG/1
5 TABLET, FILM COATED ORAL DAILY
COMMUNITY
Start: 2022-06-03

## 2022-07-11 RX ORDER — PIOGLITAZONEHYDROCHLORIDE 30 MG/1
30 TABLET ORAL DAILY
COMMUNITY
Start: 2022-03-09 | End: 2022-07-11

## 2022-07-11 RX ORDER — TRAMADOL HYDROCHLORIDE 50 MG/1
50 TABLET ORAL 2 TIMES DAILY
COMMUNITY
Start: 2022-06-03

## 2022-07-11 RX ORDER — LEVOTHYROXINE SODIUM 25 UG/1
25 TABLET ORAL
Status: DISCONTINUED | OUTPATIENT
Start: 2022-07-12 | End: 2022-07-17 | Stop reason: HOSPADM

## 2022-07-11 RX ORDER — NITROFURANTOIN 25; 75 MG/1; MG/1
100 CAPSULE ORAL EVERY 12 HOURS
Status: DISCONTINUED | OUTPATIENT
Start: 2022-07-11 | End: 2022-07-11

## 2022-07-11 RX ORDER — GLUCAGON 1 MG
1 KIT INJECTION
Status: DISCONTINUED | OUTPATIENT
Start: 2022-07-11 | End: 2022-07-17 | Stop reason: HOSPADM

## 2022-07-11 RX ORDER — ONDANSETRON 2 MG/ML
INJECTION INTRAMUSCULAR; INTRAVENOUS
Status: COMPLETED
Start: 2022-07-11 | End: 2022-07-11

## 2022-07-11 RX ORDER — CARVEDILOL 3.12 MG/1
6.25 TABLET ORAL 2 TIMES DAILY
Status: DISCONTINUED | OUTPATIENT
Start: 2022-07-11 | End: 2022-07-17 | Stop reason: HOSPADM

## 2022-07-11 RX ORDER — HEPARIN SODIUM,PORCINE/D5W 25000/250
10 INTRAVENOUS SOLUTION INTRAVENOUS CONTINUOUS
Status: DISCONTINUED | OUTPATIENT
Start: 2022-07-11 | End: 2022-07-14

## 2022-07-11 RX ORDER — NITROGLYCERIN 20 MG/100ML
5 INJECTION INTRAVENOUS CONTINUOUS
Status: DISCONTINUED | OUTPATIENT
Start: 2022-07-11 | End: 2022-07-12

## 2022-07-11 RX ORDER — GABAPENTIN 600 MG/1
600 TABLET ORAL 3 TIMES DAILY
COMMUNITY
Start: 2022-03-09

## 2022-07-11 RX ORDER — LOSARTAN POTASSIUM 50 MG/1
100 TABLET ORAL DAILY
Status: DISCONTINUED | OUTPATIENT
Start: 2022-07-11 | End: 2022-07-15

## 2022-07-11 RX ADMIN — NITROGLYCERIN 0.4 MG: 0.4 TABLET SUBLINGUAL at 03:07

## 2022-07-11 RX ADMIN — PANTOPRAZOLE SODIUM 40 MG: 40 TABLET, DELAYED RELEASE ORAL at 12:07

## 2022-07-11 RX ADMIN — PIPERACILLIN SODIUM, TAZOBACTAM SODIUM 4.5 G: 4; .5 INJECTION, POWDER, LYOPHILIZED, FOR SOLUTION INTRAVENOUS at 05:07

## 2022-07-11 RX ADMIN — ONDANSETRON 4 MG: 2 INJECTION INTRAMUSCULAR; INTRAVENOUS at 03:07

## 2022-07-11 RX ADMIN — GABAPENTIN 600 MG: 300 CAPSULE ORAL at 02:07

## 2022-07-11 RX ADMIN — ZONISAMIDE 100 MG: 100 CAPSULE ORAL at 09:07

## 2022-07-11 RX ADMIN — FUROSEMIDE 40 MG: 10 INJECTION, SOLUTION INTRAMUSCULAR; INTRAVENOUS at 12:07

## 2022-07-11 RX ADMIN — NITROGLYCERIN 5 MCG/MIN: 20 INJECTION INTRAVENOUS at 03:07

## 2022-07-11 RX ADMIN — NITROGLYCERIN 100 MCG/MIN: 20 INJECTION INTRAVENOUS at 03:07

## 2022-07-11 RX ADMIN — CARVEDILOL 6.25 MG: 3.12 TABLET, FILM COATED ORAL at 08:07

## 2022-07-11 RX ADMIN — CARVEDILOL 6.25 MG: 3.12 TABLET, FILM COATED ORAL at 02:07

## 2022-07-11 RX ADMIN — ASPIRIN 325 MG ORAL TABLET 325 MG: 325 PILL ORAL at 04:07

## 2022-07-11 RX ADMIN — NITROFURANTOIN (MONOHYDRATE/MACROCRYSTALS) 100 MG: 75; 25 CAPSULE ORAL at 12:07

## 2022-07-11 RX ADMIN — AMLODIPINE BESYLATE 5 MG: 5 TABLET ORAL at 02:07

## 2022-07-11 RX ADMIN — CEFTRIAXONE SODIUM 2 G: 2 INJECTION, POWDER, FOR SOLUTION INTRAMUSCULAR; INTRAVENOUS at 05:07

## 2022-07-11 RX ADMIN — HEPARIN SODIUM 10 UNITS/KG/HR: 10000 INJECTION, SOLUTION INTRAVENOUS at 02:07

## 2022-07-11 RX ADMIN — NITROGLYCERIN 1 INCH: 20 OINTMENT TOPICAL at 09:07

## 2022-07-11 RX ADMIN — LOSARTAN POTASSIUM 100 MG: 50 TABLET, FILM COATED ORAL at 12:07

## 2022-07-11 RX ADMIN — INSULIN ASPART 1 UNITS: 100 INJECTION, SOLUTION INTRAVENOUS; SUBCUTANEOUS at 09:07

## 2022-07-11 RX ADMIN — ATORVASTATIN CALCIUM 40 MG: 40 TABLET, FILM COATED ORAL at 08:07

## 2022-07-11 RX ADMIN — GABAPENTIN 600 MG: 300 CAPSULE ORAL at 08:07

## 2022-07-11 RX ADMIN — FUROSEMIDE 40 MG: 10 INJECTION, SOLUTION INTRAMUSCULAR; INTRAVENOUS at 05:07

## 2022-07-11 RX ADMIN — ENALAPRILAT 1.25 MG: 2.5 INJECTION INTRAVENOUS at 05:07

## 2022-07-11 RX ADMIN — ASPIRIN 81 MG: 81 TABLET, COATED ORAL at 02:07

## 2022-07-11 NOTE — H&P
[facility] - Emergency Dept  Pulmonary Critical Care Note    Patient Name: Porsha Horan  MRN: [id number]  Admission Date: 2022  Hospital Length of Stay: 0 days  Code Status: Full Code  Attending Provider: Rivas Ortega MD  Primary Care Provider: Jayy Allen MD     Subjective:     HPI:   This is an 86 year old female with a history of HTN, PAD post previous stenting, type II diabetes, and chronic kidney disease who was admitted on 22 with acute onset of shortness of breath and coughing. Upon EMS arrival, blood pressure was significantly elevated at 222/123. She was brought to the ED and started on a nitro infusion. Echocardiogram revealed severely decreased LV function with global hypokinesis with EF 25%. She was placed on a heparin gtt as well. Of note she says she was hospitalized for heart failure about 10 years ago. She is oxygenating well now on 4L oxymask. She is being admitted to the ICU for hypertensive emergency.     Hospital Course:  N/A    Interval History:  N/A      PMH: PAD BLE, HTN, JAYLA, HLD, T2DM with CKD 2  PSH: peripheral angiogram with stents  Social history: Former smoker, quit 10 years ago    Objective:     Current Outpatient Medications   Medication Instructions    aspirin (ECOTRIN) 81 mg, Oral, Daily    atorvastatin (LIPITOR) 40 MG tablet SMARTSI Tablet(s) By Mouth Every Evening    clopidogreL (PLAVIX) 75 mg, Oral, Daily    gabapentin (NEURONTIN) 600 mg, Oral, 3 times daily    hydroCHLOROthiazide (HYDRODIURIL) 25 mg, Oral, Every morning    levocetirizine (XYZAL) 5 mg, Oral, Daily    levothyroxine (SYNTHROID) 25 mcg, Oral, Every morning    pantoprazole (PROTONIX) 40 mg, Oral, Daily    traMADoL (ULTRAM) 50 mg, Oral, 2 times daily    zonisamide (ZONEGRAN) 100 mg, Oral, Nightly       Current Inpatient Medications   amLODIPine  5 mg Oral Daily    aspirin  81 mg Oral Daily    atorvastatin  40 mg Oral QHS    carvediloL  6.25 mg Oral BID     furosemide (LASIX) injection  40 mg Intravenous BID loop    gabapentin  600 mg Oral TID    [START ON 7/12/2022] levothyroxine  25 mcg Oral Before breakfast    losartan  100 mg Oral Daily    nitrofurantoin (macrocrystal-monohydrate)  100 mg Oral Q12H    nitroGLYCERIN 2% TD oint  1 inch Topical (Top) Q6H    pantoprazole  40 mg Oral Daily    zonisamide  100 mg Oral Nightly           Intake/Output Summary (Last 24 hours) at 7/11/2022 1522  Last data filed at 7/11/2022 1453  Gross per 24 hour   Intake --   Output 1000 ml   Net -1000 ml       Review of Systems   Respiratory: Positive for cough and shortness of breath.         Vital Signs (Most Recent):  Temp: 97.9 °F (36.6 °C) (07/11/22 0700)  Pulse: (!) 122 (07/11/22 1500)  Resp: (!) 24 (07/11/22 1500)  BP: (!) 168/100 (07/11/22 1500)  SpO2: 96 % (07/11/22 1500)  There is no height or weight on file to calculate BMI.  Weight: 110 kg (242 lb 8.1 oz) Vital Signs (24h Range):  Temp:  [97.3 °F (36.3 °C)-97.9 °F (36.6 °C)] 97.9 °F (36.6 °C)  Pulse:  [] 122  Resp:  [14-69] 24  SpO2:  [88 %-100 %] 96 %  BP: (104-231)/() 168/100     Physical Exam  Vitals reviewed.   Constitutional:       Appearance: Normal appearance.   HENT:      Head: Normocephalic and atraumatic.   Cardiovascular:      Rate and Rhythm: Normal rate.      Heart sounds: Normal heart sounds.   Pulmonary:      Comments: Diminished   Abdominal:      Palpations: Abdomen is soft.   Musculoskeletal:      Cervical back: Neck supple.   Neurological:      Mental Status: She is alert.           Lines/Drains/Airways     Peripheral Intravenous Line  Duration                Peripheral IV - Single Lumen 07/11/22 0315 20 G Anterior;Right Forearm <1 day         Peripheral IV - Single Lumen 07/11/22 0400 20 G Left Hand <1 day                Significant Labs:    Lab Results   Component Value Date    WBC 8.1 07/11/2022    HGB 12.6 07/11/2022    HCT 42.1 07/11/2022    MCV 90.0 07/11/2022     07/11/2022          BMP  Lab Results   Component Value Date     07/11/2022    K 3.8 07/11/2022    CO2 22 (L) 07/11/2022    BUN 19.7 07/11/2022    CREATININE 0.90 07/11/2022    CALCIUM 8.8 07/11/2022    EGFRNONAA >60 02/08/2021       ABG  No results for input(s): PH, PO2, PCO2, HCO3, BE in the last 168 hours.        Significant Imaging:  I have reviewed all pertinent imaging within the past 24 hours.        Assessment/Plan:     Assessment  1. Hypertensive emergency  2. Acute heart failure, EF 25%  3. NSTEMI  4. Urinary tract infection, culture pending      Plan  Continue nitro gtt and antihypertensive meds per cardiology  Will order Rocephin for UTI and to cover any possible respiratory infection  Continue diuretics for volume overload  Will need to repeat chest imaging in the future to document resolution of diffuse interstitial infiltrates, worse in RLL.   Will continue heparin gtt until ischemia ruled out by cardiology  Continue close monitoring in the ICU    DVT Prophylaxis: heparin gtt  GI Prophylaxis: protonix     Greater than 30 minutes of critical care was time spent personally by me on the following activities: development of treatment plan with patient or surrogate and bedside caregivers, discussions with consultants, evaluation of patient's response to treatment, examination of patient, ordering and performing treatments and interventions, ordering and review of laboratory studies, ordering and review of radiographic studies, pulse oximetry, re-evaluation of patient's condition.  This critical care time did not overlap with that of any other provider or involve time for any procedures.     CHARITO Pastor  Pulmonary Critical Care Medicine  [facility] - Emergency Dept

## 2022-07-11 NOTE — CONSULTS
Inpatient consult to Cardiology  Consult performed by: CHARITO Pisano  Consult ordered by: Roberto Mason MD        [facility] - Emergency Dept  Cardiology  Consult Note    Patient Name: Porsha Horan  MRN: [id number]  Admission Date: 7/11/2022  Hospital Length of Stay: 0 days  Code Status: Full Code   Attending Provider: Rivas Ortega MD   Consulting Provider: CHARITO Pisano  Primary Care Physician: Jayy Allen MD  Principal Problem:<principal problem not specified>    Patient information was obtained from patient and ER records.     Subjective:     Chief Complaint:       HPI:  Ms. Hoarn is an 85 y/o female, remotely followed by Dr. Macedo- last seen 04/2021, with PMHx significant for PAD, JAYLA, HTN, T2DM with CKD 2 who presented to ED via EMS with c/o sudden onset of SOB, wheezing, and worsened swelling to BLE last night. BP was elevated at 222/123. She was given Duo-neb, solumedrol, Nitro and placed on CPAP en-route with improvement in symptoms. Labs significant for troponin 0.024-->0.170.  EKG revealing Sr with nonspecific T wave abnormality in anterior lateral leads. UA + nitrites. Echo revealing EF 25%. [facility] consulted for New Onset CHF      PMH: PAD BLE, HTN, JAYLA, HLD, T2DM with CKD 2  PSH: Peripheral angiogram; partial hysterectomy  Family History: Father- HTN, Cancer-reason for death.  Mother HTN, Cancer- reason for death  Social History: former smoker    Previous Cardiac Diagnostics:   TTE   LV normal in size with mild concentric hypertrophy and severely decreased systolic function. The estimated EF is 25%. There is severe LV global hypokinesis. Grade II LV diastolic dysfunction. Mild AR. Mild to moderate TR. There is moderate pulmonary HTN. Estimated PASP 52 mmHg. Intermediate CVP 8 mmHg.     CUS 11/11/2020:  1-39% dRICA  40-59% dLICA  Antegrade right and left vertebral artery flow    Peripheral angiogram 02/04/2021:  100% occluded right SFA  50% Left SFA  S/p  PTA/stent R SFA and PTA of right AT artery  Left SFA stenosis to be managed medically    Review of Systems:  Review of Systems   Respiratory: Positive for shortness of breath.    Cardiovascular: Positive for leg swelling. Negative for chest pain and palpitations.   Gastrointestinal: Negative.    Genitourinary: Negative.    Neurological: Negative.    Psychiatric/Behavioral: Negative.        Objective:     Vital Signs (Most Recent):  Temp: 97.9 °F (36.6 °C) (07/11/22 0700)  Pulse: 88 (07/11/22 0900)  Resp: 16 (07/11/22 0700)  BP: (!) 183/111 (07/11/22 0900)  SpO2: 100 % (07/11/22 0900) Vital Signs (24h Range):  Temp:  [97.3 °F (36.3 °C)-97.9 °F (36.6 °C)] 97.9 °F (36.6 °C)  Pulse:  [] 88  Resp:  [14-69] 16  SpO2:  [88 %-100 %] 100 %  BP: (104-231)/() 183/111        There is no height or weight on file to calculate BMI.    SpO2: 100 %  O2 Device (Oxygen Therapy): Oxymask    No intake or output data in the 24 hours ending 07/11/22 1127    Lines/Drains/Airways     Peripheral Intravenous Line  Duration                Peripheral IV - Single Lumen 07/11/22 0315 20 G Anterior;Right Forearm <1 day         Peripheral IV - Single Lumen 07/11/22 0400 20 G Left Hand <1 day                  Significant Labs:  Recent Results (from the past 72 hour(s))   Troponin I #1    Collection Time: 07/11/22  3:54 AM   Result Value Ref Range    Troponin-I 0.024 0.000 - 0.045 ng/mL   B-Type natriuretic peptide (BNP)    Collection Time: 07/11/22  3:54 AM   Result Value Ref Range    Natriuretic Peptide 290.4 (H) <=100.0 pg/mL   CBC with Differential    Collection Time: 07/11/22  3:54 AM   Result Value Ref Range    WBC 8.1 4.5 - 11.5 x10(3)/mcL    RBC 4.68 4.20 - 5.40 x10(6)/mcL    Hgb 12.6 12.0 - 16.0 gm/dL    Hct 42.1 37.0 - 47.0 %    MCV 90.0 80.0 - 94.0 fL    MCH 26.9 (L) 27.0 - 31.0 pg    MCHC 29.9 (L) 33.0 - 36.0 mg/dL    RDW 17.1 (H) 11.5 - 17.0 %    Platelet 252 130 - 400 x10(3)/mcL    MPV 11.3 (H) 7.4 - 10.4 fL    Neut % 56.5 %     Lymph % 35.8 %    Mono % 5.2 %    Eos % 1.4 %    Basophil % 0.5 %    Lymph # 2.91 0.6 - 4.6 x10(3)/mcL    Neut # 4.6 2.1 - 9.2 x10(3)/mcL    Mono # 0.42 0.1 - 1.3 x10(3)/mcL    Eos # 0.11 0 - 0.9 x10(3)/mcL    Baso # 0.04 0 - 0.2 x10(3)/mcL    IG# 0.05 (H) 0 - 0.04 x10(3)/mcL    IG% 0.6 %    NRBC% 0.0 %   Comprehensive metabolic panel    Collection Time: 07/11/22  5:12 AM   Result Value Ref Range    Sodium Level 145 136 - 145 mmol/L    Potassium Level 3.8 3.5 - 5.1 mmol/L    Chloride 113 (H) 98 - 107 mmol/L    Carbon Dioxide 22 (L) 23 - 31 mmol/L    Glucose Level 190 (H) 82 - 115 mg/dL    Blood Urea Nitrogen 19.7 9.8 - 20.1 mg/dL    Creatinine 0.90 0.55 - 1.02 mg/dL    Calcium Level Total 8.8 8.4 - 10.2 mg/dL    Protein Total 6.6 5.8 - 7.6 gm/dL    Albumin Level 3.3 (L) 3.4 - 4.8 gm/dL    Globulin 3.3 2.4 - 3.5 gm/dL    Albumin/Globulin Ratio 1.0 (L) 1.1 - 2.0 ratio    Bilirubin Total 0.5 <=1.5 mg/dL    Alkaline Phosphatase 76 40 - 150 unit/L    Alanine Aminotransferase 19 0 - 55 unit/L    Aspartate Aminotransferase 19 5 - 34 unit/L    Estimated GFR- >60 mls/min/1.73/m2   Troponin I #2    Collection Time: 07/11/22  5:12 AM   Result Value Ref Range    Troponin-I 0.170 (H) 0.000 - 0.045 ng/mL   COVID-19 Rapid Screening    Collection Time: 07/11/22  5:20 AM   Result Value Ref Range    SARS COV-2 MOLECULAR Negative Negative   Echo    Collection Time: 07/11/22  9:40 AM   Result Value Ref Range    TDI SEPTAL 0.04 m/s    LV LATERAL E/E' RATIO 26.60 m/s    LV SEPTAL E/E' RATIO 33.25 m/s    Right Atrial Pressure (from IVC) 8 mmHg    RV mid diameter 2.50 cm    EF 25 %    Left Ventricular Outflow Tract Mean Velocity 0.591 cm/s    Left Ventricular Outflow Tract Mean Gradient 2.00 mmHg    TDI LATERAL 0.05 m/s    LVIDd 5.00 3.5 - 6.0 cm    IVS 1.00 (A) 0.6 - 1.1 cm    Posterior Wall 1.00 (A) 0.6 - 1.1 cm    LVIDs 3.16 2.1 - 4.0 cm    FS 37 28 - 44 %    LV mass 181.98 g    LA size 3.60 cm    RVDD 3.63 cm     TAPSE 1.89 cm    Left Ventricle Relative Wall Thickness 0.40 cm    AV mean gradient 3 mmHg    AV valve area 2.26 cm2    AV Velocity Ratio 0.74     AV index (prosthetic) 0.72     MV mean gradient 4 mmHg    MV valve area by continuity eq 1.73 cm2    E/A ratio 1.27     Mean e' 0.05 m/s    E wave deceleration time 177 msec    LVOT diameter 2.00 cm    LVOT area 3.1 cm2    LVOT peak russell 0.89 m/s    LVOT peak VTI 15.40 cm    Ao peak russell 1.21 m/s    Ao VTI 21.4 cm    LVOT stroke volume 48.36 cm3    AV peak gradient 6 mmHg    MV peak gradient 7 mmHg    TV rest pulmonary artery pressure 52 mmHg    E/E' ratio 29.56 m/s    MV Peak E Russell 1.33 m/s    TR Max Russell 3.30 m/s    MV VTI 27.9 cm    MV Peak A Russell 1.05 m/s    LV Systolic Volume 39.70 mL    LV Diastolic Volume 93.90 mL    Triscuspid Valve Regurgitation Peak Gradient 44 mmHg    LA volume (mod) 76.70 cm3   Urinalysis    Collection Time: 07/11/22 10:11 AM   Result Value Ref Range    Color, UA Light-Yellow (A) Yellow, Colorless, Other, Clear    Appearance, UA Cloudy (A) Clear    Specific Gravity, UA >=1.030 1.001 - 1.030    pH, UA 6.0 5.0, 5.5, 6.0, 6.5, 7.0, 7.5, 8.0, 8.5    Protein, UA +2 (A) Negative, 300  mg/dL    Glucose, UA Negative Negative, Normal mg/dL    Ketones, UA Negative Negative, +1, +2, +3, +4, +5, >=160, >=80 mg/dL    Blood, UA Trace-Intact (A) Negative unit/L    Bilirubin, UA Negative Negative mg/dL    Urobilinogen, UA 1.0 0.2, 1.0, Normal mg/dL    Nitrites, UA Positive (A) Negative    Leukocyte Esterase, UA +2 (A) Negative, 75  unit/L       Significant Imaging:  Imaging Results          X-Ray Chest AP Portable (Final result)  Result time 07/11/22 09:03:50    Final result by Liban Herrmann MD (07/11/22 09:03:50)                 Impression:      Changes of pulmonary vascular congestion and cardiac decompensation.    Slightly more confluent opacities in the right infrahilar region and right base infiltrate cannot be completely  excluded.    Cardiomegaly      Electronically signed by: Liban Herrmann  Date:    07/11/2022  Time:    09:03             Narrative:    EXAMINATION:  XR CHEST AP PORTABLE    CLINICAL HISTORY:  Chest Pain;    TECHNIQUE:  Single frontal view of the chest was performed.    COMPARISON:  None    FINDINGS:  Examination reveals mediastinal silhouette to be within normal limits cardiac silhouette is enlarged there is increase in interstitial and pulmonary vascular markings indicating the presence of pulmonary vascular congestion and cardiac decompensation.    Slightly more confluent opacities identified at the right base superimposed infiltrate cannot be completely excluded                                EKG:        Physical Exam  HENT:      Mouth/Throat:      Mouth: Mucous membranes are moist.   Cardiovascular:      Rate and Rhythm: Normal rate and regular rhythm.      Heart sounds: Normal heart sounds.   Pulmonary:      Effort: Respiratory distress present.      Breath sounds: Rales present.   Abdominal:      Palpations: Abdomen is soft.   Musculoskeletal:         General: Normal range of motion.   Skin:     General: Skin is warm.   Neurological:      General: No focal deficit present.      Mental Status: She is alert.   Psychiatric:         Mood and Affect: Mood normal.         Current Inpatient Medications:    Current Facility-Administered Medications:     dextrose 10% bolus 125 mL, 12.5 g, Intravenous, PRN, Steph Urena, AGACNP-BC    dextrose 10% bolus 250 mL, 25 g, Intravenous, PRN, Steph Urena, AGACNP-BC    enoxaparin injection 40 mg, 40 mg, Subcutaneous, Daily, Steph Urena, AGACNP-BC    glucagon (human recombinant) injection 1 mg, 1 mg, Intramuscular, PRN, Steph Urena, AGACNP-BC    glucose chewable tablet 16 g, 16 g, Oral, PRN, Steph TELLO Urena, AGACNP-BC    glucose chewable tablet 24 g, 24 g, Oral, PRN, Steph Urena, AGACNP-BC    insulin aspart U-100 injection 0-5 Units, 0-5 Units,  Subcutaneous, QID (AC + HS) PRN, KAILEE Johnston    naloxone 0.4 mg/mL injection 0.02 mg, 0.02 mg, Intravenous, PRN, KAILEE Johnston    nitroGLYCERIN 2% TD oint ointment 1 inch, 1 inch, Topical (Top), Q6H, Roberto Mason MD    nitroGLYCERIN SL tablet 0.4 mg, 0.4 mg, Sublingual, Q5 Min PRN, Isauro Kinney MD, 0.4 mg at 22 0319    sodium chloride 0.9% flush 10 mL, 10 mL, Intravenous, Q12H PRN, KAILEE Johnston    Current Outpatient Medications:     atorvastatin (LIPITOR) 40 MG tablet, SMARTSI Tablet(s) By Mouth Every Evening, Disp: , Rfl:     clopidogreL (PLAVIX) 75 mg tablet, Take 75 mg by mouth once daily., Disp: , Rfl:     gabapentin (NEURONTIN) 600 MG tablet, Take 600 mg by mouth 3 (three) times daily., Disp: , Rfl:     hydroCHLOROthiazide (HYDRODIURIL) 25 MG tablet, Take 25 mg by mouth every morning., Disp: , Rfl:     levocetirizine (XYZAL) 5 MG tablet, Take 5 mg by mouth once daily., Disp: , Rfl:     levothyroxine (SYNTHROID) 25 MCG tablet, Take 25 mcg by mouth every morning., Disp: , Rfl:     pantoprazole (PROTONIX) 40 MG tablet, Take 40 mg by mouth once daily., Disp: , Rfl:     pioglitazone (ACTOS) 30 MG tablet, Take 30 mg by mouth once daily., Disp: , Rfl:     traMADoL (ULTRAM) 50 mg tablet, Take 50 mg by mouth 2 (two) times daily., Disp: , Rfl:     zonisamide (ZONEGRAN) 100 MG Cap, Take 100 mg by mouth nightly., Disp: , Rfl:          VTE Risk Mitigation (From admission, onward)         Ordered     enoxaparin injection 40 mg  Daily         22     IP VTE HIGH RISK PATIENT  Once         22     Place sequential compression device  Until discontinued         22                Assessment:   Newly diagnosed CMO  --EF 25%  Acute combined systolic and diastolic HF  --.4  Diastolic dysfunction  --Grade II LV dysfunction  Hypertensive Emergency  --admit /123  NSTEMI, Type II due to hypertensive  emergency  --troponin uptrending- 0.170  + UTI  Pneumonia  No hx GIB      Plan:   Continue lasix 40 mg IVP BID. Ensure accurate I&O's/daily weights.  Continue NTG drip as BP remains significantly elevated. Recently received losartan 100. Add Beta blocker once decompensation resolved. Start amlodipine 5mg daily.   Continue to trend enzymes.   Continue NTG, aspirin, and statin. No need for Plavix- will DC. Add weight based heparin drip. DC Lovenox. Will plan for ischemic evaluation in near future once decompensation resolved.  Will plan for lifevest placement prior to Dc due to EF < 35%  Obtain lipid panel and HgA1C      Thank you for your consult.     CHARITO Pisano  Cardiology  [facility] - Emergency Dept  07/11/2022 11:27 AM

## 2022-07-11 NOTE — H&P
"U Internal Medicine History and Physical     Date of Admit: 7/11/2022  Current Hospital Day: 1     Chief Complaint:      Shortness of Breath (C/O SOB, onset tonight. Hx CHF, swelling noted to BLE, wheezing noted bilaterally. Pt. received DuoNeb and 125mg soulmedrol en route, pt. initially improved then became increasingly labored w/ SpO2 88%. CPAP applied en route. Hypertensive on arrival. )      Subjective:     History of Present Illness:  Porsha Horan is a 86 y.o. female with past medical history of PAD, hypertension, hyperlipidemia, type 2 diabetes as well as diastolic dysfunction. The patient presented [facility] ED on 7/11/2022 with a primary complaint of SOB which started overnight, patient states she had a prior diagnosis of "heart failure" was given to her at [facility] approximately 10 years ago where she was discharged on antihypertensive medications at that time, however recently she started seeing a new doctor in [place], [place] which discontinued all her antihypertensive medications.  Patient seems to have been on enalapril however other medications are unknown.  Patient not currently on GDMT.  Of note upon arrival, patient had elevated blood pressure to 231/122 and was tachycardic at 132. Emergency room started patient on nitro drip with improvement in blood pressure.    Per the patient, over the last 6 months to 1 year she has gone from sleeping with 1 pillow to sleeping with 3 pillows and occasionally will wake up short of breath, gasping for air.      In the ED, patient remained tachycardic and tachypneic with elevated blood pressures, BNP was found to be 290, troponin of 0.176, EKG unremarkable with exception of tachycardia.  Of note EKG followed by significant artifact likely 2/2 movement.     Review of Systems:  General: Denies fever, no night sweats, chills, fatigue, changes in weight.  Skin: Denies rashes, bruises, petechia  HEENT: Denies headache, head injury, no acute vision changes.  CVS: Denies " chest pain, palpitations.  Endorses orthopnea, PND, lower extremity edema  Resp: Denies wheezing.  Endorses SOB, cough productive of white sputum  GI: Denies dysphagia, melena, hematochezia, abdominal pain, nausea, vomiting, constipation.  : Denies hematuria, CVA pain, nocturia.  Endorses polyuria, dysuria  Musculoskeletal: Denies joint stiffness, pain, swelling or weakness  Neuro: Denies headaches, syncope, seizures, numbness, tingling, vertigo, dizziness    Past Medical History: See above    Past Surgical History:  No past surgical history on file.    Allergies:  Review of patient's allergies indicates:   Allergen Reactions    Tirofiban      Other reaction(s): Thrombocytopenia       Home Medications:  Prior to Admission medications    Medication Sig Start Date End Date Taking? Authorizing Provider   atorvastatin (LIPITOR) 40 MG tablet SMARTSI Tablet(s) By Mouth Every Evening 3/9/22   Historical Provider   clopidogreL (PLAVIX) 75 mg tablet Take 75 mg by mouth once daily. 3/9/22   Historical Provider   gabapentin (NEURONTIN) 600 MG tablet Take 600 mg by mouth 3 (three) times daily. 3/9/22   Historical Provider   hydroCHLOROthiazide (HYDRODIURIL) 25 MG tablet Take 25 mg by mouth every morning. 3/9/22   Historical Provider   levocetirizine (XYZAL) 5 MG tablet Take 5 mg by mouth once daily. 6/3/22   Historical Provider   levothyroxine (SYNTHROID) 25 MCG tablet Take 25 mcg by mouth every morning. 6/3/22   Historical Provider   pantoprazole (PROTONIX) 40 MG tablet Take 40 mg by mouth once daily. 22   Historical Provider   pioglitazone (ACTOS) 30 MG tablet Take 30 mg by mouth once daily. 3/9/22   Historical Provider   traMADoL (ULTRAM) 50 mg tablet Take 50 mg by mouth 2 (two) times daily. 6/3/22   Historical Provider   zonisamide (ZONEGRAN) 100 MG Cap Take 100 mg by mouth nightly. 3/9/22   Historical Provider       Family History:  Father with hypertension, cancer.  Mother with hypertension, cancer.  Son with  pancreatic cancer.  Daughter emphysema    Social History:  Former smoker > 30 PYH.  Quit approximately 10 years ago.  Denies EtOH as well as substance abuse       Objective:   Vitals  Vitals  BP: (!) 152/108  Temp: 97.9 °F (36.6 °C)  Temp src: Oral  Pulse: (!) 115  Resp: 16  SpO2: 95 %    Physical Examination:  General: Awake, alert, & oriented to person, place & time. No acute distress  Psychiatric: Mood and affect normal  HEENT: Normocephalic, atraumatic. Face symmetric.  Cardiovascular:  Tachycardic with regular rhythm. Normal S1 & S2 w/out murmurs, rubs or gallops.  No JVD appreciated.  2+ pulses throughout.  1+ pitting edema noted to bilateral lower extremities mid calf.  Pulmonary: Bilateral symmetric chest rise. Non-labored, bilateral crackles are appreciated throughout all lower and middle lobes, diffuse decreased breath sounds.  No wheezing or rhonchi are appreciated.  Increased tactile fremitus is noted right lower lobe  Abdominal:  Soft, nontender, nondistended. Bowel sounds present  Extremities: No clubbing, cyanosis or edema.  Skin:  Exposed skin is warm & dry.  Neuro:   Patient moves all extremities equally. Sensation intact bilateraly.    Laboratory:  Most Recent Data:  CMP:  Recent Labs   Lab 07/11/22 0512      K 3.8   CHLORIDE 113*   CO2 22*   BUN 19.7   CREATININE 0.90   GLUCOSE 190*   CALCIUM 8.8   ALBUMIN 3.3*   BILITOT 0.5   AST 19   ALT 19   ALKPHOS 76     CBC:  Recent Labs   Lab 07/11/22 0354   WBC 8.1   ABSNEUTRO 4.6   RBC 4.68   HGB 12.6   HCT 42.1      MCV 90.0   RDW 17.1*     Electrolytes:  Recent Labs   Lab 07/11/22 0512      K 3.8   CHLORIDE 113*   CALCIUM 8.8     DM:   Recent Labs   Lab 07/11/22 0512   CREATININE 0.90     Trended Cardiac Data:  Recent Labs   Lab 07/11/22 0354 07/11/22 0512   TROPONINI 0.024 0.170*   .4*  --          Radiology:  X-Ray Chest AP Portable    Result Date: 7/11/2022  Changes of pulmonary vascular congestion and cardiac  decompensation. Slightly more confluent opacities in the right infrahilar region and right base infiltrate cannot be completely excluded. Cardiomegaly Electronically signed by: Liban Herrmann Date:    07/11/2022 Time:    09:03       Assessment & Plan:     Acute HFrEF on chronic HFpEF   -Echo on 07/11/2022: LVEF = 25%  -BNP on 07/11/2022:  290.4  -Troponins: 0.17, troponins ordered q.6 hours along with EKG with every troponin draw   -chest x-ray as above  -cardiology has been consulted at this time appreciate their recommendations  -Lasix 40 mg b.i.d. IV  Goal diuresis 2-3L per day.   -continue home ASA, lipitor  -initiating losartan 100 mg, Coreg 6.25 mg b.i.d.    Hypertensive Emergency  -BP on admission: 231/122  -discontinue nitro drip, started patient on nitro paste, losartan, carvedilol    Urinary tract infection  -2+ LE, + nitrites, bacteria  -initiate Macrobid 100 mg b.i.d. x7 days  -awaiting culture results    Hyperlipidemia  -continue atorvastatin 40 mg  -Last LDL: unknown   -Repeat lipid panel ordered, pending results      CODE STATUS: Full Code  Access:  PIV  Antimicrobials:  Macrobid  Diet:  NPO  DVT Prophylaxis:  Lovenox  GI Prophylaxis:  Protonix  Fluids:  None    Disposition:  Currently admitted to inpatient medicine awaiting cardiology consult.  Stable condition      Roberto Mason MD  [facility] Internal Medicine - PGY-2

## 2022-07-11 NOTE — H&P (VIEW-ONLY)
Inpatient consult to Cardiology  Consult performed by: CHARITO Pisano  Consult ordered by: Roberto Mason MD        [facility] - Emergency Dept  Cardiology  Consult Note    Patient Name: Porsha Horan  MRN: [id number]  Admission Date: 7/11/2022  Hospital Length of Stay: 0 days  Code Status: Full Code   Attending Provider: Rivas Ortega MD   Consulting Provider: CHARITO Pisano  Primary Care Physician: Jayy Allen MD  Principal Problem:<principal problem not specified>    Patient information was obtained from patient and ER records.     Subjective:     Chief Complaint:       HPI:  Ms. Horan is an 85 y/o female, remotely followed by Dr. Macedo- last seen 04/2021, with PMHx significant for PAD, JAYLA, HTN, T2DM with CKD 2 who presented to ED via EMS with c/o sudden onset of SOB, wheezing, and worsened swelling to BLE last night. BP was elevated at 222/123. She was given Duo-neb, solumedrol, Nitro and placed on CPAP en-route with improvement in symptoms. Labs significant for troponin 0.024-->0.170.  EKG revealing Sr with nonspecific T wave abnormality in anterior lateral leads. UA + nitrites. Echo revealing EF 25%. [facility] consulted for New Onset CHF      PMH: PAD BLE, HTN, JAYLA, HLD, T2DM with CKD 2  PSH: Peripheral angiogram; partial hysterectomy  Family History: Father- HTN, Cancer-reason for death.  Mother HTN, Cancer- reason for death  Social History: former smoker    Previous Cardiac Diagnostics:   TTE   LV normal in size with mild concentric hypertrophy and severely decreased systolic function. The estimated EF is 25%. There is severe LV global hypokinesis. Grade II LV diastolic dysfunction. Mild AR. Mild to moderate TR. There is moderate pulmonary HTN. Estimated PASP 52 mmHg. Intermediate CVP 8 mmHg.     CUS 11/11/2020:  1-39% dRICA  40-59% dLICA  Antegrade right and left vertebral artery flow    Peripheral angiogram 02/04/2021:  100% occluded right SFA  50% Left SFA  S/p  PTA/stent R SFA and PTA of right AT artery  Left SFA stenosis to be managed medically    Review of Systems:  Review of Systems   Respiratory: Positive for shortness of breath.    Cardiovascular: Positive for leg swelling. Negative for chest pain and palpitations.   Gastrointestinal: Negative.    Genitourinary: Negative.    Neurological: Negative.    Psychiatric/Behavioral: Negative.        Objective:     Vital Signs (Most Recent):  Temp: 97.9 °F (36.6 °C) (07/11/22 0700)  Pulse: 88 (07/11/22 0900)  Resp: 16 (07/11/22 0700)  BP: (!) 183/111 (07/11/22 0900)  SpO2: 100 % (07/11/22 0900) Vital Signs (24h Range):  Temp:  [97.3 °F (36.3 °C)-97.9 °F (36.6 °C)] 97.9 °F (36.6 °C)  Pulse:  [] 88  Resp:  [14-69] 16  SpO2:  [88 %-100 %] 100 %  BP: (104-231)/() 183/111        There is no height or weight on file to calculate BMI.    SpO2: 100 %  O2 Device (Oxygen Therapy): Oxymask    No intake or output data in the 24 hours ending 07/11/22 1127    Lines/Drains/Airways     Peripheral Intravenous Line  Duration                Peripheral IV - Single Lumen 07/11/22 0315 20 G Anterior;Right Forearm <1 day         Peripheral IV - Single Lumen 07/11/22 0400 20 G Left Hand <1 day                  Significant Labs:  Recent Results (from the past 72 hour(s))   Troponin I #1    Collection Time: 07/11/22  3:54 AM   Result Value Ref Range    Troponin-I 0.024 0.000 - 0.045 ng/mL   B-Type natriuretic peptide (BNP)    Collection Time: 07/11/22  3:54 AM   Result Value Ref Range    Natriuretic Peptide 290.4 (H) <=100.0 pg/mL   CBC with Differential    Collection Time: 07/11/22  3:54 AM   Result Value Ref Range    WBC 8.1 4.5 - 11.5 x10(3)/mcL    RBC 4.68 4.20 - 5.40 x10(6)/mcL    Hgb 12.6 12.0 - 16.0 gm/dL    Hct 42.1 37.0 - 47.0 %    MCV 90.0 80.0 - 94.0 fL    MCH 26.9 (L) 27.0 - 31.0 pg    MCHC 29.9 (L) 33.0 - 36.0 mg/dL    RDW 17.1 (H) 11.5 - 17.0 %    Platelet 252 130 - 400 x10(3)/mcL    MPV 11.3 (H) 7.4 - 10.4 fL    Neut % 56.5 %     Lymph % 35.8 %    Mono % 5.2 %    Eos % 1.4 %    Basophil % 0.5 %    Lymph # 2.91 0.6 - 4.6 x10(3)/mcL    Neut # 4.6 2.1 - 9.2 x10(3)/mcL    Mono # 0.42 0.1 - 1.3 x10(3)/mcL    Eos # 0.11 0 - 0.9 x10(3)/mcL    Baso # 0.04 0 - 0.2 x10(3)/mcL    IG# 0.05 (H) 0 - 0.04 x10(3)/mcL    IG% 0.6 %    NRBC% 0.0 %   Comprehensive metabolic panel    Collection Time: 07/11/22  5:12 AM   Result Value Ref Range    Sodium Level 145 136 - 145 mmol/L    Potassium Level 3.8 3.5 - 5.1 mmol/L    Chloride 113 (H) 98 - 107 mmol/L    Carbon Dioxide 22 (L) 23 - 31 mmol/L    Glucose Level 190 (H) 82 - 115 mg/dL    Blood Urea Nitrogen 19.7 9.8 - 20.1 mg/dL    Creatinine 0.90 0.55 - 1.02 mg/dL    Calcium Level Total 8.8 8.4 - 10.2 mg/dL    Protein Total 6.6 5.8 - 7.6 gm/dL    Albumin Level 3.3 (L) 3.4 - 4.8 gm/dL    Globulin 3.3 2.4 - 3.5 gm/dL    Albumin/Globulin Ratio 1.0 (L) 1.1 - 2.0 ratio    Bilirubin Total 0.5 <=1.5 mg/dL    Alkaline Phosphatase 76 40 - 150 unit/L    Alanine Aminotransferase 19 0 - 55 unit/L    Aspartate Aminotransferase 19 5 - 34 unit/L    Estimated GFR- >60 mls/min/1.73/m2   Troponin I #2    Collection Time: 07/11/22  5:12 AM   Result Value Ref Range    Troponin-I 0.170 (H) 0.000 - 0.045 ng/mL   COVID-19 Rapid Screening    Collection Time: 07/11/22  5:20 AM   Result Value Ref Range    SARS COV-2 MOLECULAR Negative Negative   Echo    Collection Time: 07/11/22  9:40 AM   Result Value Ref Range    TDI SEPTAL 0.04 m/s    LV LATERAL E/E' RATIO 26.60 m/s    LV SEPTAL E/E' RATIO 33.25 m/s    Right Atrial Pressure (from IVC) 8 mmHg    RV mid diameter 2.50 cm    EF 25 %    Left Ventricular Outflow Tract Mean Velocity 0.591 cm/s    Left Ventricular Outflow Tract Mean Gradient 2.00 mmHg    TDI LATERAL 0.05 m/s    LVIDd 5.00 3.5 - 6.0 cm    IVS 1.00 (A) 0.6 - 1.1 cm    Posterior Wall 1.00 (A) 0.6 - 1.1 cm    LVIDs 3.16 2.1 - 4.0 cm    FS 37 28 - 44 %    LV mass 181.98 g    LA size 3.60 cm    RVDD 3.63 cm     TAPSE 1.89 cm    Left Ventricle Relative Wall Thickness 0.40 cm    AV mean gradient 3 mmHg    AV valve area 2.26 cm2    AV Velocity Ratio 0.74     AV index (prosthetic) 0.72     MV mean gradient 4 mmHg    MV valve area by continuity eq 1.73 cm2    E/A ratio 1.27     Mean e' 0.05 m/s    E wave deceleration time 177 msec    LVOT diameter 2.00 cm    LVOT area 3.1 cm2    LVOT peak russell 0.89 m/s    LVOT peak VTI 15.40 cm    Ao peak russell 1.21 m/s    Ao VTI 21.4 cm    LVOT stroke volume 48.36 cm3    AV peak gradient 6 mmHg    MV peak gradient 7 mmHg    TV rest pulmonary artery pressure 52 mmHg    E/E' ratio 29.56 m/s    MV Peak E Russell 1.33 m/s    TR Max Russell 3.30 m/s    MV VTI 27.9 cm    MV Peak A Russell 1.05 m/s    LV Systolic Volume 39.70 mL    LV Diastolic Volume 93.90 mL    Triscuspid Valve Regurgitation Peak Gradient 44 mmHg    LA volume (mod) 76.70 cm3   Urinalysis    Collection Time: 07/11/22 10:11 AM   Result Value Ref Range    Color, UA Light-Yellow (A) Yellow, Colorless, Other, Clear    Appearance, UA Cloudy (A) Clear    Specific Gravity, UA >=1.030 1.001 - 1.030    pH, UA 6.0 5.0, 5.5, 6.0, 6.5, 7.0, 7.5, 8.0, 8.5    Protein, UA +2 (A) Negative, 300  mg/dL    Glucose, UA Negative Negative, Normal mg/dL    Ketones, UA Negative Negative, +1, +2, +3, +4, +5, >=160, >=80 mg/dL    Blood, UA Trace-Intact (A) Negative unit/L    Bilirubin, UA Negative Negative mg/dL    Urobilinogen, UA 1.0 0.2, 1.0, Normal mg/dL    Nitrites, UA Positive (A) Negative    Leukocyte Esterase, UA +2 (A) Negative, 75  unit/L       Significant Imaging:  Imaging Results          X-Ray Chest AP Portable (Final result)  Result time 07/11/22 09:03:50    Final result by Liban Herrmann MD (07/11/22 09:03:50)                 Impression:      Changes of pulmonary vascular congestion and cardiac decompensation.    Slightly more confluent opacities in the right infrahilar region and right base infiltrate cannot be completely  excluded.    Cardiomegaly      Electronically signed by: Liban Herrmann  Date:    07/11/2022  Time:    09:03             Narrative:    EXAMINATION:  XR CHEST AP PORTABLE    CLINICAL HISTORY:  Chest Pain;    TECHNIQUE:  Single frontal view of the chest was performed.    COMPARISON:  None    FINDINGS:  Examination reveals mediastinal silhouette to be within normal limits cardiac silhouette is enlarged there is increase in interstitial and pulmonary vascular markings indicating the presence of pulmonary vascular congestion and cardiac decompensation.    Slightly more confluent opacities identified at the right base superimposed infiltrate cannot be completely excluded                                EKG:        Physical Exam  HENT:      Mouth/Throat:      Mouth: Mucous membranes are moist.   Cardiovascular:      Rate and Rhythm: Normal rate and regular rhythm.      Heart sounds: Normal heart sounds.   Pulmonary:      Effort: Respiratory distress present.      Breath sounds: Rales present.   Abdominal:      Palpations: Abdomen is soft.   Musculoskeletal:         General: Normal range of motion.   Skin:     General: Skin is warm.   Neurological:      General: No focal deficit present.      Mental Status: She is alert.   Psychiatric:         Mood and Affect: Mood normal.         Current Inpatient Medications:    Current Facility-Administered Medications:     dextrose 10% bolus 125 mL, 12.5 g, Intravenous, PRN, Steph Urena, AGACNP-BC    dextrose 10% bolus 250 mL, 25 g, Intravenous, PRN, Steph Urena, AGACNP-BC    enoxaparin injection 40 mg, 40 mg, Subcutaneous, Daily, Steph Urena, AGACNP-BC    glucagon (human recombinant) injection 1 mg, 1 mg, Intramuscular, PRN, Steph Urena, AGACNP-BC    glucose chewable tablet 16 g, 16 g, Oral, PRN, Steph TELLO Urena, AGACNP-BC    glucose chewable tablet 24 g, 24 g, Oral, PRN, Steph Urena, AGACNP-BC    insulin aspart U-100 injection 0-5 Units, 0-5 Units,  Subcutaneous, QID (AC + HS) PRN, KAILEE Johnston    naloxone 0.4 mg/mL injection 0.02 mg, 0.02 mg, Intravenous, PRN, KAILEE Johnston    nitroGLYCERIN 2% TD oint ointment 1 inch, 1 inch, Topical (Top), Q6H, Roberto Mason MD    nitroGLYCERIN SL tablet 0.4 mg, 0.4 mg, Sublingual, Q5 Min PRN, Isauro Kinney MD, 0.4 mg at 22 0319    sodium chloride 0.9% flush 10 mL, 10 mL, Intravenous, Q12H PRN, KAILEE Johnston    Current Outpatient Medications:     atorvastatin (LIPITOR) 40 MG tablet, SMARTSI Tablet(s) By Mouth Every Evening, Disp: , Rfl:     clopidogreL (PLAVIX) 75 mg tablet, Take 75 mg by mouth once daily., Disp: , Rfl:     gabapentin (NEURONTIN) 600 MG tablet, Take 600 mg by mouth 3 (three) times daily., Disp: , Rfl:     hydroCHLOROthiazide (HYDRODIURIL) 25 MG tablet, Take 25 mg by mouth every morning., Disp: , Rfl:     levocetirizine (XYZAL) 5 MG tablet, Take 5 mg by mouth once daily., Disp: , Rfl:     levothyroxine (SYNTHROID) 25 MCG tablet, Take 25 mcg by mouth every morning., Disp: , Rfl:     pantoprazole (PROTONIX) 40 MG tablet, Take 40 mg by mouth once daily., Disp: , Rfl:     pioglitazone (ACTOS) 30 MG tablet, Take 30 mg by mouth once daily., Disp: , Rfl:     traMADoL (ULTRAM) 50 mg tablet, Take 50 mg by mouth 2 (two) times daily., Disp: , Rfl:     zonisamide (ZONEGRAN) 100 MG Cap, Take 100 mg by mouth nightly., Disp: , Rfl:          VTE Risk Mitigation (From admission, onward)         Ordered     enoxaparin injection 40 mg  Daily         22     IP VTE HIGH RISK PATIENT  Once         22     Place sequential compression device  Until discontinued         22                Assessment:   Newly diagnosed CMO  --EF 25%  Acute combined systolic and diastolic HF  --.4  Diastolic dysfunction  --Grade II LV dysfunction  Hypertensive Emergency  --admit /123  NSTEMI, Type II due to hypertensive  emergency  --troponin uptrending- 0.170  + UTI  Pneumonia  No hx GIB      Plan:   Continue lasix 40 mg IVP BID. Ensure accurate I&O's/daily weights.  Continue NTG drip as BP remains significantly elevated. Recently received losartan 100. Add Beta blocker once decompensation resolved. Start amlodipine 5mg daily.   Continue to trend enzymes.   Continue NTG, aspirin, and statin. No need for Plavix- will DC. Add weight based heparin drip. DC Lovenox. Will plan for ischemic evaluation in near future once decompensation resolved.  Will plan for lifevest placement prior to Dc due to EF < 35%  Obtain lipid panel and HgA1C      Thank you for your consult.     CHARITO Pisano  Cardiology  [facility] - Emergency Dept  07/11/2022 11:27 AM

## 2022-07-11 NOTE — Clinical Note
The groin was prepped. The site was prepped with ChloraPrep. The site was clipped. The patient was draped. The patient was positioned supine. The patient was secured with ulnar pads.

## 2022-07-11 NOTE — Clinical Note
The DP pulses were detected with doppler bilaterally. The left radial pulse was 1+. The right radial pulse was +1 and allens test positive.

## 2022-07-11 NOTE — ED PROVIDER NOTES
Encounter Date: 7/11/2022    SCRIBE #1 NOTE: I, Juliana Newton , am scribing for, and in the presence of,   Isauro Kinney MD. I have scribed the following portions of the note - the EKG reading. Other sections scribed: HPI, ROS, PE.       History     Chief Complaint   Patient presents with    Shortness of Breath     C/O SOB, onset tonight. Hx CHF, swelling noted to BLE, wheezing noted bilaterally. Pt. received DuoNeb and 125mg soulmedrol en route, pt. initially improved then became increasingly labored w/ SpO2 88%. CPAP applied en route. Hypertensive on arrival.      A 86-year-old female with history of Carotid artery stenosis, DM type II, Essential hypertension, HLD, and PAD presents to ED via EMS c/o sudden SOB onset tonight. EMS reports SOB woke pt from her sleep, notes of worsening swelling to pt's bi lower extremities. They report BP was 222/123 with wheezes to bilateral upper and lower lung; pt received Duo Neb and 125 mg solumedrol as well as 2 sprays of Nitro followed by CPAP en route. Currently, pt reports she feels much better and denies any pain.     PCP: Jayy Allen MD      The history is provided by the patient and the EMS personnel. No  was used.   Shortness of Breath  This is a new problem. The problem occurs rarely.The current episode started less than 1 hour ago. The problem has been gradually improving. Associated symptoms include leg swelling. Pertinent negatives include no fever, no cough, no wheezing, no chest pain, no vomiting and no abdominal pain. It is unknown what precipitated the problem. The treatment provided significant relief.     Review of patient's allergies indicates:   Allergen Reactions    Tirofiban      Other reaction(s): Thrombocytopenia     No past medical history on file.  No past surgical history on file.  No family history on file.     Review of Systems   Constitutional: Negative for chills, diaphoresis, fatigue and fever.   HENT: Negative for  congestion and trouble swallowing.    Eyes: Negative for pain and discharge.   Respiratory: Positive for shortness of breath. Negative for cough and wheezing.    Cardiovascular: Positive for leg swelling. Negative for chest pain.   Gastrointestinal: Negative for abdominal pain, diarrhea, nausea and vomiting.   Genitourinary: Negative for dysuria, flank pain, vaginal bleeding and vaginal discharge.   Musculoskeletal: Negative for arthralgias.   Skin: Negative for color change.   Neurological: Negative for syncope, speech difficulty and weakness.   Psychiatric/Behavioral: Negative for agitation and confusion.   All other systems reviewed and are negative.      Physical Exam     Initial Vitals [07/11/22 0309]   BP Pulse Resp Temp SpO2   (!) 231/122 (!) 132 (!) 38 97.3 °F (36.3 °C) 97 %      MAP       --         Physical Exam    Nursing note and vitals reviewed.  Constitutional: She appears well-developed.   Anxious   HENT:   Head: Normocephalic and atraumatic.   Mouth/Throat: Oropharynx is clear and moist.   Eyes: Conjunctivae and EOM are normal. Pupils are equal, round, and reactive to light.   Neck: Neck supple.   Cardiovascular:   No murmur heard.  Tachycardic   Pulmonary/Chest: She exhibits no tenderness.   BS diminished bilaterally. Tachypnea   Abdominal: Abdomen is soft. Bowel sounds are normal. She exhibits no distension. There is no abdominal tenderness.   Musculoskeletal:         General: Edema (Trace pitting edema) present. Normal range of motion.      Cervical back: Neck supple.      Lumbar back: Normal. Normal range of motion.     Neurological: She is alert and oriented to person, place, and time. She has normal strength. No cranial nerve deficit or sensory deficit.   Psychiatric: She has a normal mood and affect. Judgment normal.         ED Course   Critical Care    Date/Time: 7/11/2022 3:33 AM  Performed by: Isauro Kinney MD  Authorized by: Isauro Kinney MD   Total critical care time  (exclusive of procedural time) : 45 minutes  Critical care was necessary to treat or prevent imminent or life-threatening deterioration of the following conditions: respiratory failure.  Critical care was time spent personally by me on the following activities: blood draw for specimens, development of treatment plan with patient or surrogate, interpretation of cardiac output measurements, examination of patient, ordering and performing treatments and interventions, re-evaluation of patient's condition, ventilator management, vascular access procedures, pulse oximetry, ordering and review of laboratory studies, obtaining history from patient or surrogate, evaluation of patient's response to treatment and discussions with consultants.        Labs Reviewed   B-TYPE NATRIURETIC PEPTIDE - Abnormal; Notable for the following components:       Result Value    Natriuretic Peptide 290.4 (*)     All other components within normal limits   CBC WITH DIFFERENTIAL - Abnormal; Notable for the following components:    MCH 26.9 (*)     MCHC 29.9 (*)     RDW 17.1 (*)     MPV 11.3 (*)     IG# 0.05 (*)     All other components within normal limits   TROPONIN I - Normal   BLOOD CULTURE [facility]   BLOOD CULTURE [facility]   CBC W/ AUTO DIFFERENTIAL    Narrative:     The following orders were created for panel order CBC auto differential.  Procedure                               Abnormality         Status                     ---------                               -----------         ------                     CBC with Differential[[id number]]        Abnormal            Final result                 Please view results for these tests on the individual orders.   COMPREHENSIVE METABOLIC PANEL   TROPONIN I   SARS-COV-2 RNA AMPLIFICATION, QUAL     EKG Readings: (Independently Interpreted)   EKG done at 0313 at 131bpm Tachycardic       Imaging Results          X-Ray Chest AP Portable (In process)                  Medications   nitroGLYCERIN SL tablet 0.4  mg (0.4 mg Sublingual Given 7/11/22 0319)   nitroGLYCERIN 50 mg in dextrose 5 % 250 mL infusion (TITRATING) (0 mcg/min Intravenous Rate/Dose Change 7/11/22 1132)   piperacillin-tazobactam (ZOSYN) 4.5 g in dextrose 5 % in water (D5W) 5 % 100 mL IVPB (MB+) (has no administration in time range)   nitroGLYCERIN 2% TD oint ointment 1 inch (has no administration in time range)   aspirin tablet 325 mg (325 mg Oral Given 7/11/22 0405)   ondansetron injection 4 mg (4 mg Intravenous Given 7/11/22 8914)   enalaprilat injection 1.25 mg (1.25 mg Intravenous Given 7/11/22 3539)              Scribe Attestation:   Scribe #1: I performed the above scribed service and the documentation accurately describes the services I performed. I attest to the accuracy of the note.    Attending Attestation:           Physician Attestation for Scribe:  Physician Attestation Statement for Scribe #1: I,  Isauro Kinney MD, reviewed documentation, as scribed by Juliana Newton  in my presence, and it is both accurate and complete.                      Clinical Impression:   Final diagnoses:  [R07.9] Chest pain  [J81.0] Flash pulmonary edema (Primary)  [J18.9] Pneumonia of right lower lobe due to infectious organism          ED Disposition Condition    Admit               Isauro Kinney MD  07/11/22 6147

## 2022-07-11 NOTE — Clinical Note
The catheter was removed from the and was repositioned into the ostium   right coronary artery. An angiography was performed of the right coronary arteries. Multiple views were taken. The angiography was performed via power injection.

## 2022-07-11 NOTE — Clinical Note
The catheter was inserted into the and was removed from the ostium   left main. An angiography was performed of the left coronary arteries. Multiple views were taken. The angiography was performed via power injection.  JL4

## 2022-07-12 LAB
ALBUMIN SERPL-MCNC: 3.1 GM/DL (ref 3.4–4.8)
ALBUMIN/GLOB SERPL: 0.8 RATIO (ref 1.1–2)
ALP SERPL-CCNC: 66 UNIT/L (ref 40–150)
ALT SERPL-CCNC: 20 UNIT/L (ref 0–55)
APTT PPP: 104.1 SECONDS (ref 23.2–33.7)
AST SERPL-CCNC: 26 UNIT/L (ref 5–34)
BASOPHILS # BLD AUTO: 0.01 X10(3)/MCL (ref 0–0.2)
BASOPHILS NFR BLD AUTO: 0.1 %
BILIRUBIN DIRECT+TOT PNL SERPL-MCNC: 0.5 MG/DL
BUN SERPL-MCNC: 26.3 MG/DL (ref 9.8–20.1)
CALCIUM SERPL-MCNC: 9 MG/DL (ref 8.4–10.2)
CHLORIDE SERPL-SCNC: 108 MMOL/L (ref 98–107)
CHOLEST SERPL-MCNC: 170 MG/DL
CHOLEST/HDLC SERPL: 3 {RATIO} (ref 0–5)
CO2 SERPL-SCNC: 25 MMOL/L (ref 23–31)
CREAT SERPL-MCNC: 1.17 MG/DL (ref 0.55–1.02)
EOSINOPHIL # BLD AUTO: 0 X10(3)/MCL (ref 0–0.9)
EOSINOPHIL NFR BLD AUTO: 0 %
ERYTHROCYTE [DISTWIDTH] IN BLOOD BY AUTOMATED COUNT: 17.1 % (ref 11.5–17)
EST. AVERAGE GLUCOSE BLD GHB EST-MCNC: 137 MG/DL
GLOBULIN SER-MCNC: 3.8 GM/DL (ref 2.4–3.5)
GLUCOSE SERPL-MCNC: 136 MG/DL (ref 82–115)
HBA1C MFR BLD: 6.4 %
HCT VFR BLD AUTO: 39.7 % (ref 37–47)
HDLC SERPL-MCNC: 66 MG/DL (ref 35–60)
HGB BLD-MCNC: 11.7 GM/DL (ref 12–16)
IMM GRANULOCYTES # BLD AUTO: 0.03 X10(3)/MCL (ref 0–0.04)
IMM GRANULOCYTES NFR BLD AUTO: 0.3 %
LDLC SERPL CALC-MCNC: 96 MG/DL (ref 50–140)
LYMPHOCYTES # BLD AUTO: 1.04 X10(3)/MCL (ref 0.6–4.6)
LYMPHOCYTES NFR BLD AUTO: 11.9 %
MCH RBC QN AUTO: 26.8 PG (ref 27–31)
MCHC RBC AUTO-ENTMCNC: 29.5 MG/DL (ref 33–36)
MCV RBC AUTO: 91.1 FL (ref 80–94)
MONOCYTES # BLD AUTO: 0.61 X10(3)/MCL (ref 0.1–1.3)
MONOCYTES NFR BLD AUTO: 7 %
NEUTROPHILS # BLD AUTO: 7.1 X10(3)/MCL (ref 2.1–9.2)
NEUTROPHILS NFR BLD AUTO: 80.7 %
NRBC BLD AUTO-RTO: 0 %
PLATELET # BLD AUTO: 236 X10(3)/MCL (ref 130–400)
PMV BLD AUTO: 11.2 FL (ref 7.4–10.4)
POCT GLUCOSE: 117 MG/DL (ref 70–110)
POCT GLUCOSE: 142 MG/DL (ref 70–110)
POCT GLUCOSE: 151 MG/DL (ref 70–110)
POTASSIUM SERPL-SCNC: 4 MMOL/L (ref 3.5–5.1)
PROT SERPL-MCNC: 6.9 GM/DL (ref 5.8–7.6)
RBC # BLD AUTO: 4.36 X10(6)/MCL (ref 4.2–5.4)
SODIUM SERPL-SCNC: 144 MMOL/L (ref 136–145)
TRIGL SERPL-MCNC: 42 MG/DL (ref 37–140)
TROPONIN I SERPL-MCNC: 0.97 NG/ML (ref 0–0.04)
TROPONIN I SERPL-MCNC: 2.16 NG/ML (ref 0–0.04)
VLDLC SERPL CALC-MCNC: 8 MG/DL
WBC # SPEC AUTO: 8.8 X10(3)/MCL (ref 4.5–11.5)

## 2022-07-12 PROCEDURE — 27000221 HC OXYGEN, UP TO 24 HOURS

## 2022-07-12 PROCEDURE — 80053 COMPREHEN METABOLIC PANEL: CPT | Performed by: NURSE PRACTITIONER

## 2022-07-12 PROCEDURE — 93005 ELECTROCARDIOGRAM TRACING: CPT

## 2022-07-12 PROCEDURE — 25000003 PHARM REV CODE 250: Performed by: STUDENT IN AN ORGANIZED HEALTH CARE EDUCATION/TRAINING PROGRAM

## 2022-07-12 PROCEDURE — 83036 HEMOGLOBIN GLYCOSYLATED A1C: CPT | Performed by: STUDENT IN AN ORGANIZED HEALTH CARE EDUCATION/TRAINING PROGRAM

## 2022-07-12 PROCEDURE — 80061 LIPID PANEL: CPT | Performed by: NURSE PRACTITIONER

## 2022-07-12 PROCEDURE — 85025 COMPLETE CBC W/AUTO DIFF WBC: CPT | Performed by: NURSE PRACTITIONER

## 2022-07-12 PROCEDURE — 85730 THROMBOPLASTIN TIME PARTIAL: CPT | Performed by: INTERNAL MEDICINE

## 2022-07-12 PROCEDURE — 94761 N-INVAS EAR/PLS OXIMETRY MLT: CPT

## 2022-07-12 PROCEDURE — 36415 COLL VENOUS BLD VENIPUNCTURE: CPT | Performed by: NURSE PRACTITIONER

## 2022-07-12 PROCEDURE — 25000003 PHARM REV CODE 250: Performed by: NURSE PRACTITIONER

## 2022-07-12 PROCEDURE — 25000003 PHARM REV CODE 250: Performed by: INTERNAL MEDICINE

## 2022-07-12 PROCEDURE — 84484 ASSAY OF TROPONIN QUANT: CPT | Performed by: NURSE PRACTITIONER

## 2022-07-12 PROCEDURE — 63600175 PHARM REV CODE 636 W HCPCS: Performed by: NURSE PRACTITIONER

## 2022-07-12 PROCEDURE — 11000001 HC ACUTE MED/SURG PRIVATE ROOM

## 2022-07-12 PROCEDURE — 63600175 PHARM REV CODE 636 W HCPCS: Performed by: INTERNAL MEDICINE

## 2022-07-12 PROCEDURE — 36415 COLL VENOUS BLD VENIPUNCTURE: CPT | Performed by: STUDENT IN AN ORGANIZED HEALTH CARE EDUCATION/TRAINING PROGRAM

## 2022-07-12 RX ADMIN — CEFTRIAXONE SODIUM 2 G: 2 INJECTION, POWDER, FOR SOLUTION INTRAMUSCULAR; INTRAVENOUS at 04:07

## 2022-07-12 RX ADMIN — LEVOTHYROXINE SODIUM 25 MCG: 25 TABLET ORAL at 06:07

## 2022-07-12 RX ADMIN — CARVEDILOL 6.25 MG: 3.12 TABLET, FILM COATED ORAL at 09:07

## 2022-07-12 RX ADMIN — HEPARIN SODIUM 10 UNITS/KG/HR: 10000 INJECTION, SOLUTION INTRAVENOUS at 02:07

## 2022-07-12 RX ADMIN — NITROGLYCERIN 1 INCH: 20 OINTMENT TOPICAL at 12:07

## 2022-07-12 RX ADMIN — GABAPENTIN 600 MG: 300 CAPSULE ORAL at 02:07

## 2022-07-12 RX ADMIN — ASPIRIN 81 MG: 81 TABLET, COATED ORAL at 08:07

## 2022-07-12 RX ADMIN — GABAPENTIN 600 MG: 300 CAPSULE ORAL at 08:07

## 2022-07-12 RX ADMIN — GABAPENTIN 600 MG: 300 CAPSULE ORAL at 09:07

## 2022-07-12 RX ADMIN — AMLODIPINE BESYLATE 5 MG: 5 TABLET ORAL at 08:07

## 2022-07-12 RX ADMIN — CARVEDILOL 6.25 MG: 3.12 TABLET, FILM COATED ORAL at 08:07

## 2022-07-12 RX ADMIN — FUROSEMIDE 40 MG: 10 INJECTION, SOLUTION INTRAMUSCULAR; INTRAVENOUS at 04:07

## 2022-07-12 RX ADMIN — PANTOPRAZOLE SODIUM 40 MG: 40 TABLET, DELAYED RELEASE ORAL at 08:07

## 2022-07-12 RX ADMIN — ATORVASTATIN CALCIUM 40 MG: 40 TABLET, FILM COATED ORAL at 09:07

## 2022-07-12 RX ADMIN — FUROSEMIDE 40 MG: 10 INJECTION, SOLUTION INTRAMUSCULAR; INTRAVENOUS at 08:07

## 2022-07-12 RX ADMIN — LOSARTAN POTASSIUM 100 MG: 50 TABLET, FILM COATED ORAL at 08:07

## 2022-07-12 RX ADMIN — ZONISAMIDE 100 MG: 100 CAPSULE ORAL at 09:07

## 2022-07-12 NOTE — PROGRESS NOTES
[facility] -   Cardiology  Progress Note    Patient Name: Porsha Horan  MRN: [id number]  Admission Date: 7/11/2022  Hospital Length of Stay: 1 days  Code Status: Full Code   Attending Provider: Rivas Ortega MD   Consulting Provider: CHARITO Pisano  Primary Care Physician: Jayy Allen MD  Principal Problem:Flash pulmonary edema    Patient information was obtained from patient and ER records.     Subjective:     HPI:  Ms. Horan is an 85 y/o female, remotely followed by Dr. Macedo- last seen 04/2021, with PMHx significant for PAD, JAYLA, HTN, T2DM with CKD 2 who presented to ED via EMS with c/o sudden onset of SOB, wheezing, and worsened swelling to BLE last night. BP was elevated at 222/123. She was given Duo-neb, solumedrol, Nitro and placed on CPAP en-route with improvement in symptoms. Labs significant for troponin 0.024-->0.170.  EKG revealing Sr with nonspecific T wave abnormality in anterior lateral leads. UA + nitrites. Echo revealing EF 25%. [facility] consulted for New Onset CHF    Hospital Course:  7/12/22: Currently in ICU. NTG drip off. BP much improved on oral antihypertensives. BUN/creatinine have bumped. She has been weaned to 1 LPM/NC with good oxygenation    PMH: PAD BLE, HTN, JAYLA, HLD, T2DM with CKD 2  PSH: Peripheral angiogram; partial hysterectomy  Family History: Father- HTN, Cancer-reason for death.  Mother HTN, Cancer- reason for death  Social History: former smoker    Previous Cardiac Diagnostics:   TTE 07/11/22:  LV normal in size with mild concentric hypertrophy and severely decreased systolic function. The estimated EF is 25%. There is severe LV global hypokinesis. Grade II LV diastolic dysfunction. Mild AR. Mild to moderate TR. There is moderate pulmonary HTN. Estimated PASP 52 mmHg. Intermediate CVP 8 mmHg.     CUS 11/11/2020:  1-39% dRICA  40-59% dLICA  Antegrade right and left vertebral artery flow    Peripheral angiogram 02/04/2021:  100% occluded right  SFA  50% Left SFA  S/p PTA/stent R SFA and PTA of right AT artery  Left SFA stenosis to be managed medically    Review of Systems:  Review of Systems   Respiratory: Negative for shortness of breath.    Cardiovascular: Positive for leg swelling. Negative for chest pain and palpitations.   Gastrointestinal: Negative.    Genitourinary: Negative.    Neurological: Negative.    Psychiatric/Behavioral: Negative.        Objective:     Vital Signs (Most Recent):  Temp: 97.7 °F (36.5 °C) (07/12/22 1200)  Pulse: 65 (07/12/22 1100)  Resp: 13 (07/12/22 1100)  BP: (!) 152/84 (07/12/22 1100)  SpO2: 96 % (07/12/22 1100) Vital Signs (24h Range):  Temp:  [97.5 °F (36.4 °C)-98.2 °F (36.8 °C)] 97.7 °F (36.5 °C)  Pulse:  [] 65  Resp:  [13-33] 13  SpO2:  [90 %-100 %] 96 %  BP: (106-168)/() 152/84     Weight: 110 kg (242 lb 8.1 oz)  There is no height or weight on file to calculate BMI.    SpO2: 96 %  O2 Device (Oxygen Therapy): nasal cannula      Intake/Output Summary (Last 24 hours) at 7/12/2022 1458  Last data filed at 7/12/2022 1422  Gross per 24 hour   Intake 655.96 ml   Output 1500 ml   Net -844.04 ml       Lines/Drains/Airways       Drain  Duration             Female External Urinary Catheter 07/11/22 1900 <1 day              Peripheral Intravenous Line  Duration                  Peripheral IV - Single Lumen 07/11/22 0400 20 G Left Hand 1 day         Peripheral IV - Single Lumen 07/11/22 1500  Anterior;Distal;Right Upper Arm <1 day                      Significant Labs:  Recent Results (from the past 72 hour(s))   Troponin I #1    Collection Time: 07/11/22  3:54 AM   Result Value Ref Range    Troponin-I 0.024 0.000 - 0.045 ng/mL   B-Type natriuretic peptide (BNP)    Collection Time: 07/11/22  3:54 AM   Result Value Ref Range    Natriuretic Peptide 290.4 (H) <=100.0 pg/mL   CBC with Differential    Collection Time: 07/11/22  3:54 AM   Result Value Ref Range    WBC 8.1 4.5 - 11.5 x10(3)/mcL    RBC 4.68 4.20 - 5.40  x10(6)/mcL    Hgb 12.6 12.0 - 16.0 gm/dL    Hct 42.1 37.0 - 47.0 %    MCV 90.0 80.0 - 94.0 fL    MCH 26.9 (L) 27.0 - 31.0 pg    MCHC 29.9 (L) 33.0 - 36.0 mg/dL    RDW 17.1 (H) 11.5 - 17.0 %    Platelet 252 130 - 400 x10(3)/mcL    MPV 11.3 (H) 7.4 - 10.4 fL    Neut % 56.5 %    Lymph % 35.8 %    Mono % 5.2 %    Eos % 1.4 %    Basophil % 0.5 %    Lymph # 2.91 0.6 - 4.6 x10(3)/mcL    Neut # 4.6 2.1 - 9.2 x10(3)/mcL    Mono # 0.42 0.1 - 1.3 x10(3)/mcL    Eos # 0.11 0 - 0.9 x10(3)/mcL    Baso # 0.04 0 - 0.2 x10(3)/mcL    IG# 0.05 (H) 0 - 0.04 x10(3)/mcL    IG% 0.6 %    NRBC% 0.0 %   Comprehensive metabolic panel    Collection Time: 07/11/22  5:12 AM   Result Value Ref Range    Sodium Level 145 136 - 145 mmol/L    Potassium Level 3.8 3.5 - 5.1 mmol/L    Chloride 113 (H) 98 - 107 mmol/L    Carbon Dioxide 22 (L) 23 - 31 mmol/L    Glucose Level 190 (H) 82 - 115 mg/dL    Blood Urea Nitrogen 19.7 9.8 - 20.1 mg/dL    Creatinine 0.90 0.55 - 1.02 mg/dL    Calcium Level Total 8.8 8.4 - 10.2 mg/dL    Protein Total 6.6 5.8 - 7.6 gm/dL    Albumin Level 3.3 (L) 3.4 - 4.8 gm/dL    Globulin 3.3 2.4 - 3.5 gm/dL    Albumin/Globulin Ratio 1.0 (L) 1.1 - 2.0 ratio    Bilirubin Total 0.5 <=1.5 mg/dL    Alkaline Phosphatase 76 40 - 150 unit/L    Alanine Aminotransferase 19 0 - 55 unit/L    Aspartate Aminotransferase 19 5 - 34 unit/L    Estimated GFR- >60 mls/min/1.73/m2   Troponin I #2    Collection Time: 07/11/22  5:12 AM   Result Value Ref Range    Troponin-I 0.170 (H) 0.000 - 0.045 ng/mL   Blood Culture #1 **CANNOT BE ORDERED STAT**    Collection Time: 07/11/22  5:12 AM    Specimen: Blood   Result Value Ref Range    CULTURE, BLOOD ([facility]) No Growth At 24 Hours    Blood culture #2 **CANNOT BE ORDERED STAT**    Collection Time: 07/11/22  5:12 AM    Specimen: Blood   Result Value Ref Range    CULTURE, BLOOD ([facility]) No Growth At 24 Hours    COVID-19 Rapid Screening    Collection Time: 07/11/22  5:20 AM   Result Value Ref Range     SARS COV-2 MOLECULAR Negative Negative   Echo    Collection Time: 07/11/22  9:40 AM   Result Value Ref Range    TDI SEPTAL 0.04 m/s    LV LATERAL E/E' RATIO 26.60 m/s    LV SEPTAL E/E' RATIO 33.25 m/s    Right Atrial Pressure (from IVC) 8 mmHg    RV mid diameter 2.50 cm    EF 25 %    Left Ventricular Outflow Tract Mean Velocity 0.591 cm/s    Left Ventricular Outflow Tract Mean Gradient 2.00 mmHg    TDI LATERAL 0.05 m/s    LVIDd 5.00 3.5 - 6.0 cm    IVS 1.00 (A) 0.6 - 1.1 cm    Posterior Wall 1.00 (A) 0.6 - 1.1 cm    LVIDs 3.16 2.1 - 4.0 cm    FS 37 28 - 44 %    LV mass 181.98 g    LA size 3.60 cm    RVDD 3.63 cm    TAPSE 1.89 cm    Left Ventricle Relative Wall Thickness 0.40 cm    AV mean gradient 3 mmHg    AV valve area 2.26 cm2    AV Velocity Ratio 0.74     AV index (prosthetic) 0.72     MV mean gradient 4 mmHg    MV valve area by continuity eq 1.73 cm2    E/A ratio 1.27     Mean e' 0.05 m/s    E wave deceleration time 177 msec    LVOT diameter 2.00 cm    LVOT area 3.1 cm2    LVOT peak russell 0.89 m/s    LVOT peak VTI 15.40 cm    Ao peak russell 1.21 m/s    Ao VTI 21.4 cm    LVOT stroke volume 48.36 cm3    AV peak gradient 6 mmHg    MV peak gradient 7 mmHg    TV rest pulmonary artery pressure 52 mmHg    E/E' ratio 29.56 m/s    MV Peak E Russell 1.33 m/s    TR Max Russell 3.30 m/s    MV VTI 27.9 cm    MV Peak A Russell 1.05 m/s    LV Systolic Volume 39.70 mL    LV Diastolic Volume 93.90 mL    Triscuspid Valve Regurgitation Peak Gradient 44 mmHg    LA volume (mod) 76.70 cm3   Urinalysis    Collection Time: 07/11/22 10:11 AM   Result Value Ref Range    Color, UA Light-Yellow (A) Yellow, Colorless, Other, Clear    Appearance, UA Cloudy (A) Clear    Specific Gravity, UA >=1.030 1.001 - 1.030    pH, UA 6.0 5.0, 5.5, 6.0, 6.5, 7.0, 7.5, 8.0, 8.5    Protein, UA +2 (A) Negative, 300  mg/dL    Glucose, UA Negative Negative, Normal mg/dL    Ketones, UA Negative Negative, +1, +2, +3, +4, +5, >=160, >=80 mg/dL    Blood, UA Trace-Intact (A)  Negative unit/L    Bilirubin, UA Negative Negative mg/dL    Urobilinogen, UA 1.0 0.2, 1.0, Normal mg/dL    Nitrites, UA Positive (A) Negative    Leukocyte Esterase, UA +2 (A) Negative, 75  unit/L   Urinalysis, Microscopic    Collection Time: 07/11/22 10:11 AM   Result Value Ref Range    RBC, UA 0-2 None Seen, 0-2, 3-5, 0-5 /HPF    WBC, UA >100 (A) None Seen, 0-2, 3-5, 0-5 /HPF    Squamous Epithelial Cells, UA 5-10 (A) 0-4, None Seen /HPF    Bacteria, UA Many (A) None Seen, Rare, Occasional /HPF   Urine culture    Collection Time: 07/11/22 10:11 AM    Specimen: Urine   Result Value Ref Range    Urine Culture >/= 100,000 colonies/ml Gram-negative Rods (A)    Troponin I    Collection Time: 07/11/22 11:58 AM   Result Value Ref Range    Troponin-I 0.854 (H) 0.000 - 0.045 ng/mL   Protime-INR    Collection Time: 07/11/22  1:34 PM   Result Value Ref Range    PT 12.6 12.5 - 14.5 seconds    INR 0.95 0.00 - 1.30   POCT glucose    Collection Time: 07/11/22  2:59 PM   Result Value Ref Range    POCT Glucose 196 (H) 70 - 110 mg/dL   Troponin I    Collection Time: 07/11/22  6:26 PM   Result Value Ref Range    Troponin-I 1.744 (H) 0.000 - 0.045 ng/mL   APTT    Collection Time: 07/11/22  8:33 PM   Result Value Ref Range    PTT 75.5 (H) 23.2 - 33.7 seconds   POCT glucose    Collection Time: 07/11/22  9:32 PM   Result Value Ref Range    POCT Glucose 219 (H) 70 - 110 mg/dL   Troponin I    Collection Time: 07/11/22 11:30 PM   Result Value Ref Range    Troponin-I 2.157 (H) 0.000 - 0.045 ng/mL   Lipid Panel    Collection Time: 07/12/22  2:16 AM   Result Value Ref Range    Cholesterol Total 170 <=200 mg/dL    HDL Cholesterol 66 (H) 35 - 60 mg/dL    Triglyceride 42 37 - 140 mg/dL    Cholesterol/HDL Ratio 3 0 - 5    Very Low Density Lipoprotein 8     LDL Cholesterol 96.00 50.00 - 140.00 mg/dL   Hemoglobin A1C    Collection Time: 07/12/22  2:16 AM   Result Value Ref Range    Hemoglobin A1c 6.4 <=7.0 %    Estimated Average Glucose 137.0 mg/dL    Comprehensive Metabolic Panel (CMP)    Collection Time: 07/12/22  2:16 AM   Result Value Ref Range    Sodium Level 144 136 - 145 mmol/L    Potassium Level 4.0 3.5 - 5.1 mmol/L    Chloride 108 (H) 98 - 107 mmol/L    Carbon Dioxide 25 23 - 31 mmol/L    Glucose Level 136 (H) 82 - 115 mg/dL    Blood Urea Nitrogen 26.3 (H) 9.8 - 20.1 mg/dL    Creatinine 1.17 (H) 0.55 - 1.02 mg/dL    Calcium Level Total 9.0 8.4 - 10.2 mg/dL    Protein Total 6.9 5.8 - 7.6 gm/dL    Albumin Level 3.1 (L) 3.4 - 4.8 gm/dL    Globulin 3.8 (H) 2.4 - 3.5 gm/dL    Albumin/Globulin Ratio 0.8 (L) 1.1 - 2.0 ratio    Bilirubin Total 0.5 <=1.5 mg/dL    Alkaline Phosphatase 66 40 - 150 unit/L    Alanine Aminotransferase 20 0 - 55 unit/L    Aspartate Aminotransferase 26 5 - 34 unit/L    Estimated GFR- 57 mls/min/1.73/m2   PTT Heparin Monitoring    Collection Time: 07/12/22  2:16 AM   Result Value Ref Range    PTT Heparin Monitor 104.1 (H) 23.2 - 33.7 seconds   CBC with Differential    Collection Time: 07/12/22  2:16 AM   Result Value Ref Range    WBC 8.8 4.5 - 11.5 x10(3)/mcL    RBC 4.36 4.20 - 5.40 x10(6)/mcL    Hgb 11.7 (L) 12.0 - 16.0 gm/dL    Hct 39.7 37.0 - 47.0 %    MCV 91.1 80.0 - 94.0 fL    MCH 26.8 (L) 27.0 - 31.0 pg    MCHC 29.5 (L) 33.0 - 36.0 mg/dL    RDW 17.1 (H) 11.5 - 17.0 %    Platelet 236 130 - 400 x10(3)/mcL    MPV 11.2 (H) 7.4 - 10.4 fL    Neut % 80.7 %    Lymph % 11.9 %    Mono % 7.0 %    Eos % 0.0 %    Basophil % 0.1 %    Lymph # 1.04 0.6 - 4.6 x10(3)/mcL    Neut # 7.1 2.1 - 9.2 x10(3)/mcL    Mono # 0.61 0.1 - 1.3 x10(3)/mcL    Eos # 0.00 0 - 0.9 x10(3)/mcL    Baso # 0.01 0 - 0.2 x10(3)/mcL    IG# 0.03 0 - 0.04 x10(3)/mcL    IG% 0.3 %    NRBC% 0.0 %   POCT glucose    Collection Time: 07/12/22  6:14 AM   Result Value Ref Range    POCT Glucose 151 (H) 70 - 110 mg/dL   POCT glucose    Collection Time: 07/12/22 11:56 AM   Result Value Ref Range    POCT Glucose 117 (H) 70 - 110 mg/dL       Significant Imaging:  Imaging  Results              X-Ray Chest AP Portable (Final result)  Result time 07/11/22 09:03:50      Final result by Liban Herrmann MD (07/11/22 09:03:50)                   Impression:      Changes of pulmonary vascular congestion and cardiac decompensation.    Slightly more confluent opacities in the right infrahilar region and right base infiltrate cannot be completely excluded.    Cardiomegaly      Electronically signed by: Liban Herrmann  Date:    07/11/2022  Time:    09:03               Narrative:    EXAMINATION:  XR CHEST AP PORTABLE    CLINICAL HISTORY:  Chest Pain;    TECHNIQUE:  Single frontal view of the chest was performed.    COMPARISON:  None    FINDINGS:  Examination reveals mediastinal silhouette to be within normal limits cardiac silhouette is enlarged there is increase in interstitial and pulmonary vascular markings indicating the presence of pulmonary vascular congestion and cardiac decompensation.    Slightly more confluent opacities identified at the right base superimposed infiltrate cannot be completely excluded                                      EKG:        Physical Exam  HENT:      Mouth/Throat:      Mouth: Mucous membranes are moist.   Cardiovascular:      Rate and Rhythm: Normal rate and regular rhythm.      Heart sounds: Normal heart sounds.   Pulmonary:      Effort: Respiratory distress present.      Breath sounds: Rales present.   Abdominal:      Palpations: Abdomen is soft.   Musculoskeletal:         General: Normal range of motion.   Skin:     General: Skin is warm.   Neurological:      General: No focal deficit present.      Mental Status: She is alert.   Psychiatric:         Mood and Affect: Mood normal.         Current Inpatient Medications:    Current Facility-Administered Medications:     amLODIPine tablet 5 mg, 5 mg, Oral, Daily, CHARITO Pisano, 5 mg at 07/12/22 0801    aspirin EC tablet 81 mg, 81 mg, Oral, Daily, Roberto Mason MD, 81 mg at 07/12/22 0800     atorvastatin tablet 40 mg, 40 mg, Oral, QHS, Roberto Mason MD, 40 mg at 07/11/22 2035    carvediloL tablet 6.25 mg, 6.25 mg, Oral, BID, Julio Batres MD, 6.25 mg at 07/12/22 0801    cefTRIAXone (ROCEPHIN) 2 g in dextrose 5 % in water (D5W) 5 % 50 mL IVPB (MB+), 2 g, Intravenous, Q24H, Nati Gaytan, FNP, Stopped at 07/11/22 1812    dextrose 10% bolus 125 mL, 12.5 g, Intravenous, PRN, Steph G Romel, AGACNP-BC    dextrose 10% bolus 250 mL, 25 g, Intravenous, PRN, Steph G Romel, AGACNP-BC    furosemide injection 40 mg, 40 mg, Intravenous, BID loop, Julio Batres MD, 40 mg at 07/12/22 0800    gabapentin capsule 600 mg, 600 mg, Oral, TID, Roberto Mason MD, 600 mg at 07/12/22 1422    glucagon (human recombinant) injection 1 mg, 1 mg, Intramuscular, PRN, Steph G Romel, AGACNP-BC    glucose chewable tablet 16 g, 16 g, Oral, PRN, Steph G Romel, AGACNP-BC    glucose chewable tablet 24 g, 24 g, Oral, PRN, Steph G Romel, AGACNP-BC    heparin 25,000 units in dextrose 5% (100 units/ml) IV bolus from bag - ADDITIONAL PRN BOLUS - 30 units/kg (max bolus 4000 units), 30 Units/kg, Intravenous, PRN, Pat Caldwell, FNP    heparin 25,000 units in dextrose 5% (100 units/ml) IV bolus from bag - ADDITIONAL PRN BOLUS - 60 units/kg (max bolus 4000 units), 36.4 Units/kg, Intravenous, PRN, Pat [place]ILE [place], FNP    heparin 25,000 units in dextrose 5% 250 mL (100 units/mL) infusion LOW INTENSITY nomogram - [facility], 10 Units/kg/hr, Intravenous, Continuous, EDGAR PisanoP, Last Rate: 11 mL/hr at 07/12/22 1422, 10 Units/kg/hr at 07/12/22 1422    insulin aspart U-100 injection 0-5 Units, 0-5 Units, Subcutaneous, QID (AC + HS) PRN, Steph Urena, [facility], 1 Units at 07/11/22 2137    levothyroxine tablet 25 mcg, 25 mcg, Oral, Before breakfast, Julio Batres MD, 25 mcg at 07/12/22 0613    losartan tablet 100 mg, 100 mg, Oral, Daily, Julio Batres MD, 100 mg at 07/12/22 0800    naloxone 0.4 mg/mL injection 0.02 mg, 0.02 mg,  Intravenous, PRN, Steph Urena, AGACNP-BC    nitroGLYCERIN 2% TD oint ointment 1 inch, 1 inch, Topical (Top), Q6H, Roberto Mason MD    nitroGLYCERIN 50 mg in dextrose 5 % 250 mL infusion (TITRATING), 0-400 mcg/min, Intravenous, Continuous, CHARITO Pisano, Stopped at 07/11/22 2100    nitroGLYCERIN 50 mg in dextrose 5 % 250 mL infusion (TITRATING), 5 mcg/min, Intravenous, Continuous, Rivas Ortega MD, Stopped at 07/11/22 1515    nitroGLYCERIN SL tablet 0.4 mg, 0.4 mg, Sublingual, Q5 Min PRN, Isauro Kinney MD, 0.4 mg at 07/11/22 0319    pantoprazole EC tablet 40 mg, 40 mg, Oral, Daily, Roberto Mason MD, 40 mg at 07/12/22 0802    sodium chloride 0.9% flush 10 mL, 10 mL, Intravenous, Q12H PRN, Steph Urena, SHANECNP-BC    zonisamide capsule 100 mg, 100 mg, Oral, Nightly, Roberto Mason MD, 100 mg at 07/11/22 2124         VTE Risk Mitigation (From admission, onward)           Ordered     heparin 25,000 units in dextrose 5% (100 units/ml) IV bolus from bag - ADDITIONAL PRN BOLUS - 60 units/kg (max bolus 4000 units)  As needed (PRN)        Question:  Heparin Infusion Adjustment (DO NOT MODIFY ANSWER)  Answer:  [email or web address]\Images\Pharmacy\HeparinInfusions\heparin LOW INTENSITY nomogram for [facility] [id number]    07/11/22 1320     heparin 25,000 units in dextrose 5% (100 units/ml) IV bolus from bag - ADDITIONAL PRN BOLUS - 30 units/kg (max bolus 4000 units)  As needed (PRN)        Question:  Heparin Infusion Adjustment (DO NOT MODIFY ANSWER)  Answer:  \[email or web address]\Images\Pharmacy\HeparinInfusions\heparin LOW INTENSITY nomogram for [facility] [id number]    07/11/22 1320     heparin 25,000 units in dextrose 5% 250 mL (100 units/mL) infusion LOW INTENSITY nomogram - [facility]  Continuous        Question Answer Comment   Begin at (in units/kg/hr) 12    Heparin Infusion Adjustment (DO NOT MODIFY ANSWER) [email or web address]\Images\Pharmacy\HeparinInfusions\heparin LOW INTENSITY nomogram for [facility] [id number]         07/11/22 1320     IP VTE HIGH RISK PATIENT  Once         07/11/22 0627     Place sequential compression device  Until discontinued         07/11/22 0627                    Assessment:   NSTEMI, Type I vs II  --troponin uptrending- 2.157  Newly diagnosed CMO  --EF 25%  Acute combined systolic and diastolic HF  --.4  Diastolic dysfunction  --Grade II LV dysfunction  Hypertensive Emergency  --admit /123  + UTI  --urine culture + GNR  Pneumonia  No hx GIB      Plan:   Diuresing well. Continue lasix 40 mg IVP BID. Ensure accurate I&O's/daily weights. Monitor renal indices  DC Tridil drip. BP improved on current therapy. Continue losartan and Coreg   Denies CP. Trend troponins  Continue aspirin, statin, and weight based heparin drip. Will plan for ischemic evaluation in near future once decompensation resolved.  Will plan for lifevest placement prior to Dc due to EF < 35%      Olamide Knight MD  Cardiology  07/12/2022

## 2022-07-12 NOTE — PROGRESS NOTES
Pulmonary & Critical Care Medicine   Progress Note      Presenting History/HPI:    This is an 86 year old female with a history of HTN, PAD post previous stenting, type II diabetes, and chronic kidney disease who was admitted on 7/11/22 with acute onset of shortness of breath and coughing. Upon EMS arrival, blood pressure was significantly elevated at 222/123. She was brought to the ED and started on a nitro infusion. Echocardiogram revealed severely decreased LV function with global hypokinesis with EF 25%. She was placed on a heparin gtt as well. Of note she says she was hospitalized for heart failure about 10 years ago. She is oxygenating well now on 4L oxymask. She is being admitted to the ICU for hypertensive emergency.     Interval History:    NAEON. Patient states she is feeling much better today. No BM since admission. /88 while interviewing patient. She has SpO2 of 92% on 1L NC. She denies any headache, blurry vision, angina, shortness of breath, nausea, vomiting, dysuria or polyuria.    Scheduled Medications:    amLODIPine  5 mg Oral Daily    aspirin  81 mg Oral Daily    atorvastatin  40 mg Oral QHS    carvediloL  6.25 mg Oral BID    cefTRIAXone (ROCEPHIN) IVPB  2 g Intravenous Q24H    furosemide (LASIX) injection  40 mg Intravenous BID loop    gabapentin  600 mg Oral TID    levothyroxine  25 mcg Oral Before breakfast    losartan  100 mg Oral Daily    nitroGLYCERIN 2% TD oint  1 inch Topical (Top) Q6H    pantoprazole  40 mg Oral Daily    zonisamide  100 mg Oral Nightly       PRN Medications:   dextrose 10%, dextrose 10%, glucagon (human recombinant), glucose, glucose, heparin (PORCINE), heparin (PORCINE), insulin aspart U-100, naloxone, nitroGLYCERIN, sodium chloride 0.9%      Infusions:     heparin (porcine) in D5W 10 Units/kg/hr (07/11/22 1440)    nitroGLYCERIN Stopped (07/11/22 2100)    nitroGLYCERIN Stopped (07/11/22 1515)         Fluid Balance:     Intake/Output Summary (Last  24 hours) at 7/12/2022 1046  Last data filed at 7/12/2022 1000  Gross per 24 hour   Intake 567.96 ml   Output 2500 ml   Net -1932.04 ml           Vital Signs:   Vitals:    07/12/22 1000   BP: (!) 140/64   Pulse: 70   Resp: 17   Temp:          Physical Exam  Constitutional:       Appearance: Normal appearance.   HENT:      Head: Normocephalic and atraumatic.      Mouth/Throat:      Mouth: Mucous membranes are dry.      Pharynx: Oropharynx is clear.   Eyes:      Extraocular Movements: Extraocular movements intact.      Conjunctiva/sclera: Conjunctivae normal.      Pupils: Pupils are equal, round, and reactive to light.   Cardiovascular:      Rate and Rhythm: Normal rate and regular rhythm.      Pulses: Normal pulses.      Heart sounds: Normal heart sounds. No murmur heard.  Pulmonary:      Effort: Pulmonary effort is normal.      Breath sounds: Normal breath sounds.   Abdominal:      General: Abdomen is flat. Bowel sounds are normal. There is no distension.      Palpations: Abdomen is soft.      Tenderness: There is no right CVA tenderness, left CVA tenderness or guarding.   Musculoskeletal:         General: Normal range of motion.      Right lower leg: Edema present.      Comments: Trace edema up to knee RLE   Skin:     General: Skin is warm.      Capillary Refill: Capillary refill takes less than 2 seconds.   Neurological:      General: No focal deficit present.      Mental Status: She is alert and oriented to person, place, and time. Mental status is at baseline.   Psychiatric:         Mood and Affect: Mood normal.         Behavior: Behavior normal.         Thought Content: Thought content normal.         Laboratory Studies:   No results for input(s): PH, PCO2, PO2, HCO3, POCSATURATED, BE in the last 24 hours.  Recent Labs   Lab 07/12/22 0216   WBC 8.8   RBC 4.36   HGB 11.7*   HCT 39.7      MCV 91.1   MCH 26.8*   MCHC 29.5*     Recent Labs   Lab 07/12/22 0216   GLUCOSE 136*      K 4.0   CO2 25   BUN  26.3*   CREATININE 1.17*         Microbiology Data:   Microbiology Results (last 7 days)     Procedure Component Value Units Date/Time    Blood Culture #1 **CANNOT BE ORDERED STAT** [[id number]]  (Normal) Collected: 07/11/22 0512    Order Status: Completed Specimen: Blood Updated: 07/12/22 0802     CULTURE, BLOOD ([facility]) No Growth At 24 Hours    Blood culture #2 **CANNOT BE ORDERED STAT** [[id number]]  (Normal) Collected: 07/11/22 0512    Order Status: Completed Specimen: Blood Updated: 07/12/22 0802     CULTURE, BLOOD ([facility]) No Growth At 24 Hours    Urine culture [[id number]]  (Abnormal) Collected: 07/11/22 1011    Order Status: Completed Specimen: Urine Updated: 07/12/22 0705     Urine Culture >/= 100,000 colonies/ml Gram-negative Rods            Imaging:   Echo  · The left ventricle is normal in size with mild concentric hypertrophy   and severely decreased systolic function.  · The estimated ejection fraction is 25%.  · There is severe left ventricular global hypokinesis.  · Grade II left ventricular diastolic dysfunction.  · Mild aortic regurgitation.  · Mild to moderate tricuspid regurgitation.  · There is moderate pulmonary hypertension.  · The estimated PA systolic pressure is 52 mmHg.  · Intermediate central venous pressure (8 mmHg).     X-Ray Chest AP Portable  Narrative: EXAMINATION:  XR CHEST AP PORTABLE    CLINICAL HISTORY:  Chest Pain;    TECHNIQUE:  Single frontal view of the chest was performed.    COMPARISON:  None    FINDINGS:  Examination reveals mediastinal silhouette to be within normal limits cardiac silhouette is enlarged there is increase in interstitial and pulmonary vascular markings indicating the presence of pulmonary vascular congestion and cardiac decompensation.    Slightly more confluent opacities identified at the right base superimposed infiltrate cannot be completely excluded  Impression: Changes of pulmonary vascular congestion and cardiac decompensation.    Slightly more confluent  opacities in the right infrahilar region and right base infiltrate cannot be completely excluded.    Cardiomegaly    Electronically signed by: Liban Herrmann  Date:    07/11/2022  Time:    09:03          Assessment and Plan    Assessment:    1. Hypertensive emergency  2. Acute heart failure, EF 25%  3. NSTEMI  4. Urinary tract infection, culture pending      Plan:    -Continue nitro gtt and antihypertensive meds per cardiology  -Rocephin Day 2, UTI culture revealing gram negative, sensitives pending  -Continue diuretics for volume overload  -Will need to repeat chest imaging in the future to document resolution of diffuse interstitial infiltrates, worse in RLL.   -Will continue heparin gtt until ischemia ruled out by cardiology; EKG from 7/11 revealed Anterolateral infarct with PACs. ECHO results above.    -Trend cardiac enzymes, current trop ascending  -Continue close monitoring in the ICU    DVT Prophylaxis: heparin gtt  GI Prophylaxis: protonix    Jose Valdivia MD  [facility], [facility]

## 2022-07-13 LAB
ANION GAP SERPL CALC-SCNC: 9 MEQ/L
APTT PPP: 83.3 SECONDS (ref 23.2–33.7)
BACTERIA UR CULT: ABNORMAL
BASOPHILS # BLD AUTO: 0.04 X10(3)/MCL (ref 0–0.2)
BASOPHILS NFR BLD AUTO: 0.7 %
BUN SERPL-MCNC: 28.2 MG/DL (ref 9.8–20.1)
CALCIUM SERPL-MCNC: 9.1 MG/DL (ref 8.4–10.2)
CHLORIDE SERPL-SCNC: 99 MMOL/L (ref 98–107)
CO2 SERPL-SCNC: 34 MMOL/L (ref 23–31)
CREAT SERPL-MCNC: 1.27 MG/DL (ref 0.55–1.02)
CREAT/UREA NIT SERPL: 22
EOSINOPHIL # BLD AUTO: 0.06 X10(3)/MCL (ref 0–0.9)
EOSINOPHIL NFR BLD AUTO: 1.1 %
ERYTHROCYTE [DISTWIDTH] IN BLOOD BY AUTOMATED COUNT: 17.2 % (ref 11.5–17)
GLUCOSE SERPL-MCNC: 128 MG/DL (ref 82–115)
HCT VFR BLD AUTO: 42.4 % (ref 37–47)
HGB BLD-MCNC: 12.6 GM/DL (ref 12–16)
IMM GRANULOCYTES # BLD AUTO: 0.03 X10(3)/MCL (ref 0–0.04)
IMM GRANULOCYTES NFR BLD AUTO: 0.5 %
LYMPHOCYTES # BLD AUTO: 1.41 X10(3)/MCL (ref 0.6–4.6)
LYMPHOCYTES NFR BLD AUTO: 24.9 %
MCH RBC QN AUTO: 26.9 PG (ref 27–31)
MCHC RBC AUTO-ENTMCNC: 29.7 MG/DL (ref 33–36)
MCV RBC AUTO: 90.4 FL (ref 80–94)
MONOCYTES # BLD AUTO: 0.45 X10(3)/MCL (ref 0.1–1.3)
MONOCYTES NFR BLD AUTO: 7.9 %
NEUTROPHILS # BLD AUTO: 3.7 X10(3)/MCL (ref 2.1–9.2)
NEUTROPHILS NFR BLD AUTO: 64.9 %
NRBC BLD AUTO-RTO: 0 %
PLATELET # BLD AUTO: 218 X10(3)/MCL (ref 130–400)
PMV BLD AUTO: 11 FL (ref 7.4–10.4)
POCT GLUCOSE: 105 MG/DL (ref 70–110)
POCT GLUCOSE: 130 MG/DL (ref 70–110)
POCT GLUCOSE: 204 MG/DL (ref 70–110)
POTASSIUM SERPL-SCNC: 3.3 MMOL/L (ref 3.5–5.1)
RBC # BLD AUTO: 4.69 X10(6)/MCL (ref 4.2–5.4)
SODIUM SERPL-SCNC: 142 MMOL/L (ref 136–145)
WBC # SPEC AUTO: 5.7 X10(3)/MCL (ref 4.5–11.5)

## 2022-07-13 PROCEDURE — 36415 COLL VENOUS BLD VENIPUNCTURE: CPT | Performed by: INTERNAL MEDICINE

## 2022-07-13 PROCEDURE — 63600175 PHARM REV CODE 636 W HCPCS: Performed by: INTERNAL MEDICINE

## 2022-07-13 PROCEDURE — 80048 BASIC METABOLIC PNL TOTAL CA: CPT | Performed by: NURSE PRACTITIONER

## 2022-07-13 PROCEDURE — 25000003 PHARM REV CODE 250: Performed by: NURSE PRACTITIONER

## 2022-07-13 PROCEDURE — 63600175 PHARM REV CODE 636 W HCPCS: Performed by: NURSE PRACTITIONER

## 2022-07-13 PROCEDURE — 25000003 PHARM REV CODE 250: Performed by: STUDENT IN AN ORGANIZED HEALTH CARE EDUCATION/TRAINING PROGRAM

## 2022-07-13 PROCEDURE — 25000003 PHARM REV CODE 250: Performed by: INTERNAL MEDICINE

## 2022-07-13 PROCEDURE — 11000001 HC ACUTE MED/SURG PRIVATE ROOM

## 2022-07-13 PROCEDURE — 36415 COLL VENOUS BLD VENIPUNCTURE: CPT | Performed by: NURSE PRACTITIONER

## 2022-07-13 PROCEDURE — 97162 PT EVAL MOD COMPLEX 30 MIN: CPT

## 2022-07-13 PROCEDURE — 85025 COMPLETE CBC W/AUTO DIFF WBC: CPT | Performed by: NURSE PRACTITIONER

## 2022-07-13 PROCEDURE — 85730 THROMBOPLASTIN TIME PARTIAL: CPT | Performed by: INTERNAL MEDICINE

## 2022-07-13 PROCEDURE — 97166 OT EVAL MOD COMPLEX 45 MIN: CPT

## 2022-07-13 RX ORDER — NITROFURANTOIN 25; 75 MG/1; MG/1
100 CAPSULE ORAL EVERY 12 HOURS
Status: DISCONTINUED | OUTPATIENT
Start: 2022-07-13 | End: 2022-07-17 | Stop reason: HOSPADM

## 2022-07-13 RX ORDER — FUROSEMIDE 40 MG/1
40 TABLET ORAL DAILY
Status: DISCONTINUED | OUTPATIENT
Start: 2022-07-14 | End: 2022-07-16

## 2022-07-13 RX ADMIN — ASPIRIN 81 MG: 81 TABLET, COATED ORAL at 10:07

## 2022-07-13 RX ADMIN — FUROSEMIDE 40 MG: 10 INJECTION, SOLUTION INTRAMUSCULAR; INTRAVENOUS at 08:07

## 2022-07-13 RX ADMIN — CARVEDILOL 6.25 MG: 3.12 TABLET, FILM COATED ORAL at 10:07

## 2022-07-13 RX ADMIN — AMLODIPINE BESYLATE 5 MG: 5 TABLET ORAL at 10:07

## 2022-07-13 RX ADMIN — ZONISAMIDE 100 MG: 100 CAPSULE ORAL at 08:07

## 2022-07-13 RX ADMIN — CARVEDILOL 6.25 MG: 3.12 TABLET, FILM COATED ORAL at 08:07

## 2022-07-13 RX ADMIN — GABAPENTIN 600 MG: 300 CAPSULE ORAL at 08:07

## 2022-07-13 RX ADMIN — GABAPENTIN 600 MG: 300 CAPSULE ORAL at 04:07

## 2022-07-13 RX ADMIN — LOSARTAN POTASSIUM 100 MG: 50 TABLET, FILM COATED ORAL at 10:07

## 2022-07-13 RX ADMIN — PANTOPRAZOLE SODIUM 40 MG: 40 TABLET, DELAYED RELEASE ORAL at 10:07

## 2022-07-13 RX ADMIN — HEPARIN SODIUM 10 UNITS/KG/HR: 10000 INJECTION, SOLUTION INTRAVENOUS at 02:07

## 2022-07-13 RX ADMIN — NITROFURANTOIN (MONOHYDRATE/MACROCRYSTALS) 100 MG: 75; 25 CAPSULE ORAL at 08:07

## 2022-07-13 RX ADMIN — LEVOTHYROXINE SODIUM 25 MCG: 25 TABLET ORAL at 05:07

## 2022-07-13 RX ADMIN — ATORVASTATIN CALCIUM 40 MG: 40 TABLET, FILM COATED ORAL at 08:07

## 2022-07-13 RX ADMIN — GABAPENTIN 600 MG: 300 CAPSULE ORAL at 10:07

## 2022-07-13 NOTE — PT/OT/SLP EVAL
Physical Therapy Evaluation    Patient Name:  Porsha Horan   MRN:  [id number]    Recommendations:     Discharge Recommendations:  home, home health PT   Discharge Equipment Recommendations: none   Barriers to discharge: medical diagnosis    Assessment:     Porsha Horan is a 86 y.o. female admitted with a medical diagnosis of Flash pulmonary edema; NSTEMI; CMO with EF 25%; acute combined systolic and diastolic heart failure; HTN emergency; UTI.  She presents with the following impairments/functional limitations:  gait instability, impaired balance, impaired functional mobilty.  Patient tolerated PT eval well. Patient was overall SBA-CGA, except for when she ambulated without use of AD she did require Jeana for stability 2/2 demonstrated decreased balance. Patient would benefit from an additional few PT sessions in order to ensure safety with mobility before d/c home.  Rehab Prognosis: Good; patient would benefit from acute skilled PT services to address these deficits and reach maximum level of function.    Recent Surgery: * No surgery found *      Plan:     During this hospitalization, patient to be seen 5 x/week to address the identified rehab impairments via gait training, therapeutic activities, therapeutic exercises and progress toward the following goals:    · Plan of Care Expires:  08/13/22    Subjective     Chief Complaint: n/a  Patient/Family Comments/goals: to go home  Pain/Comfort:  · Pain Rating 1: 0/10    Patients cultural, spiritual, Protestant conflicts given the current situation: no    Living Environment:  Pt reports living in a [facility] with her spouse and granddaughter; they are able to assist if needed.  Prior to admission, patients level of function was independent and active within her household (ie. Cooking, cleaning. Etc).    Equipment used at home: cane, straight- occasionally.  DME owned (not currently used): single point cane.    Upon discharge, patient will have assistance from  family.    Objective:     Communicated with NSG prior to session.  Patient found HOB elevated with PureWick, peripheral IV  upon PT entry to room.    General Precautions: Standard, fall   Orthopedic Precautions:N/A   Braces: N/A  Respiratory Status: Room air    Exams:  · Cognitive Exam:  Patient is oriented to Person, Place, Time (did not know year but knew month) and Situation  · RLE Strength: WFL  · LLE Strength: WFL    Functional Mobility:  · Bed Mobility:     · Supine to Sit: independence  · Transfers:     · Sit to Stand:  contact guard assistance with rolling walker  · Gait: 40 feet with use of RW- slow but steady step-through pattern, no LOB + 40 feet without use of AD- somewhat unsteady; had 1 LOB and then felt more comfortable with at least 1 UE assist and usedd IV pole    Patient left up in chair with all lines intact and call button in reach.    GOALS:   Multidisciplinary Problems     Physical Therapy Goals        Problem: Physical Therapy    Goal Priority Disciplines Outcome Goal Variances Interventions   Physical Therapy Goal     PT, PT/OT Ongoing, Progressing     Description: Goals to be met by: 22     Patient will increase functional independence with mobility by performin. Sit to stand transfer with Modified [place]  2. Gait  x 150 feet with Modified [place] using Single-point Cane vs none.                      History:     No past medical history on file.    No past surgical history on file.    Time Tracking:     PT Received On: 22  PT Start Time: 1444     PT Stop Time: 1500  PT Total Time (min): 16 min     Billable Minutes: Evaluation mod      2022

## 2022-07-13 NOTE — PLAN OF CARE
07/13/22 0853   Discharge Assessment   Assessment Type Discharge Planning Assessment   Confirmed/corrected address, phone number and insurance Yes   Source of Information patient;family   When was your last doctors appointment?   (Patient reports last PCP appointment was 2 months ago.)   Reason For Admission CHF   Lives With grandchild(claudia)   Do you expect to return to your current living situation? Yes   Do you have help at home or someone to help you manage your care at home? Yes   Current cognitive status: Alert/Oriented   Walking or Climbing Stairs Difficulty none   Dressing/Bathing Difficulty none   Equipment Currently Used at Home cane, straight   Patient currently being followed by outpatient case management? No   Do you currently have service(s) that help you manage your care at home? No   Do you take prescription medications? Yes   Do you have prescription coverage? Yes   Do you have any problems affording any of your prescribed medications? No   How do you get to doctors appointments? family or friend will provide   Are you on dialysis? No   Do you take coumadin? No   Discharge Plan A Home with family   Discharge Plan B Home   DME Needed Upon Discharge  none   Discharge Plan discussed with: Patient;Adult children   PCP is Jayy Allen MD

## 2022-07-13 NOTE — PLAN OF CARE
Problem: Physical Therapy  Goal: Physical Therapy Goal  Description: Goals to be met by: 22     Patient will increase functional independence with mobility by performin. Sit to stand transfer with Modified [place]  2. Gait  x 150 feet with Modified [place] using Single-point Cane vs none.     Outcome: Ongoing, Progressing

## 2022-07-13 NOTE — PT/OT/SLP EVAL
Occupational Therapy   Evaluation    Name: Porsha Horan  MRN: [id number]  Admitting Diagnosis:  Flash pulmonary edema  Recent Surgery: Procedure(s) (LRB):  Left heart cath (N/A)      Recommendations:     Discharge Recommendations: home, home with home health (TBD, pending progress.)  Discharge Equipment Recommendations:  bath bench (TBD, pending progress.)    Assessment:     Porsha Horan is a 86 y.o. female with a medical diagnosis of Flash pulmonary edema. Performance deficits affecting function: weakness, impaired endurance, impaired self care skills, impaired functional mobilty, impaired balance, decreased safety awareness.      Rehab Prognosis: Good; patient would benefit from acute skilled OT services to address these deficits and reach maximum level of function.       Plan:     Patient to be seen 5 x/week to address the above listed problems via self-care/home management, therapeutic activities, therapeutic exercises  · Plan of Care Expires: 07/27/22  · Plan of Care Reviewed with: patient, sibling    Subjective     Chief Complaint: None.  Patient/Family Comments/goals: [facility] to [facility].    Occupational Profile:  Living Environment: Live with  and granddaughter (works FT) in [facility] in [place] with R HR. Owns tub/shower.  Previous level of function: Independent with ADL/IADLs except pt was not driving.  Equipment Used at Home:   (Pt owns RW, BSC, and HurryCane (utilized in community)) Pt did not utilize AD while ambulating in home.  Assistance upon Discharge:  and granddaughter    Pain/Comfort:  · Pain Rating 1: 0/10    Patients cultural, spiritual, Temple conflicts given the current situation: no    Objective:     Communicated with: RN prior to session.  Patient found up in chair with peripheral IV, telemetry upon OT entry to room.    General Precautions: Standard, fall   Respiratory Status: Room air   Vitals:   BP: 127/68 mmHg  LA: 83 bpm    Occupational Performance:    Functional  Mobility/Transfers:  · Patient completed Sit <> Stand Transfer with stand by assistance  with  rolling walker   · Functional Mobility: Pt performed short gait trial using RW with CGA provided.    Activities of Daily Living:  · Lower Body Dressing: stand by assistance to don/doff B socks while seated in chair.    Cognitive/Visual Perceptual:  Cognitive/Psychosocial Skills:     -       Oriented to: Person, Place, Situation and able to recall month and day/wk. Pt required education on date and year.   -       Follows Commands/attention:Follows one-step commands and Follows two-step commands  -       Safety awareness/insight to disability: impaired     Physical Exam:  Sensation:    -       Intact  Upper Extremity Range of Motion:     -       Right Upper Extremity: WFL  -       Left Upper Extremity: WFL  Upper Extremity Strength:    -       Right Upper Extremity: WFL  -       Left Upper Extremity: WFL  Education:    Patient left up in chair with all lines intact, call button in reach and pt's sister present    GOALS:   Multidisciplinary Problems     Occupational Therapy Goals        Problem: Occupational Therapy    Goal Priority Disciplines Outcome Interventions   Occupational Therapy Goal     OT, PT/OT Ongoing, Progressing    Description: Goals to be met by: 7/27/22    Patient will increase functional independence with ADLs by performing:    LE Dressing with Modified [place].  Toileting from toilet with Modified [place] for hygiene and clothing management.   Bathing from shower chair or TTB with Modified [place].                     History:     No past medical history on file.    No past surgical history on file.    Time Tracking:     OT Date of Treatment: 7/13/22  OT Start Time: 1621  OT Stop Time: 1640  OT Total Time (min): 19 min    Billable Minutes:Evaluation Mod complexity 19 mins    7/13/2022

## 2022-07-13 NOTE — PLAN OF CARE
Problem: Occupational Therapy  Goal: Occupational Therapy Goal  Description: Goals to be met by: 7/27/22    Patient will increase functional independence with ADLs by performing:    LE Dressing with Modified [place].  Toileting from toilet with Modified [place] for hygiene and clothing management.   Bathing from shower chair or TTB with Modified [place].    Outcome: Ongoing, Progressing

## 2022-07-13 NOTE — PLAN OF CARE
Referral for lifevest sent to [facility] via [facility]. Dionna notified.

## 2022-07-13 NOTE — PROGRESS NOTES
"[facility]  Hospital Medicine Progress Note        Chief Complaint: SOB.     HPI:   86 y.o. female with past medical history of PAD, hypertension, hyperlipidemia, type 2 diabetes as well as diastolic dysfunction. The patient presented to [facility] ED on 7/11/2022 with a primary complaint of SOB which started overnight, patient states she had a prior diagnosis of "heart failure" was given to her at [facility] approximately 10 years ago where she was discharged on antihypertensive medications at that time, however recently she started seeing a new doctor in [place], [place] which discontinued all her antihypertensive medications.  Patient seems to have been on enalapril however other medications are unknown.  Patient not currently on GDMT.  Of note upon arrival, patient had elevated blood pressure to 231/122 and was tachycardic at 132. Emergency room started patient on nitro drip with improvement in blood pressure.     Per the patient, over the last 6 months to 1 year she has gone from sleeping with 1 pillow to sleeping with 3 pillows and occasionally will wake up short of breath, gasping for air.       In the ED, patient remained tachycardic and tachypneic with elevated blood pressures, BNP was found to be 290, troponin of 0.176, EKG unremarkable with exception of tachycardia.      Interval Hx:   She was brought to the ED and started on a nitro infusion. Echocardiogram revealed severely decreased LV function with global hypokinesis with EF 25%. She was placed on a heparin gtt as well. Of note she says she was hospitalized for heart failure about 10 years ago. She is oxygenating well now on 4L oxymask. She was subsequently transferred to the ICU for HTN emergency.   Cardiologist was consulted for flash pulmonary edema/new onset chf, echo revealed EF 25%.    SOB is much improved, family present at bedside, they tell me angiogram is planned for morning.   Prior to admission patient was living independently " and doing IADLs and ADLS with minimal to no assistance; she does have a cane for gait stability as needed.     Objective/physical exam:  General: Appears comfortable, no acute distress.  Integumentary: Warm, dry, intact.  Musculoskeletal: Purposeful movement noted.   Respiratory: No accessory muscle use. Breath sounds are equal.  Cardiovascular: Regular rate. No peripheral edema.    VITAL SIGNS: 24 HRS MIN & MAX LAST   Temp  Min: 97.9 °F (36.6 °C)  Max: 98.1 °F (36.7 °C) 98.1 °F (36.7 °C)   BP  Min: 96/50  Max: 149/91 132/78   Pulse  Min: 9  Max: 78  74   Resp  Min: 17  Max: 31 20   SpO2  Min: 93 %  Max: 100 % (!) 93 %     Echo  · The left ventricle is normal in size with mild concentric hypertrophy   and severely decreased systolic function.  · The estimated ejection fraction is 25%.  · There is severe left ventricular global hypokinesis.  · Grade II left ventricular diastolic dysfunction.  · Mild aortic regurgitation.  · Mild to moderate tricuspid regurgitation.  · There is moderate pulmonary hypertension.  · The estimated PA systolic pressure is 52 mmHg.  · Intermediate central venous pressure (8 mmHg).     X-Ray Chest AP Portable  Narrative: EXAMINATION:  XR CHEST AP PORTABLE    CLINICAL HISTORY:  Chest Pain;    TECHNIQUE:  Single frontal view of the chest was performed.    COMPARISON:  None    FINDINGS:  Examination reveals mediastinal silhouette to be within normal limits cardiac silhouette is enlarged there is increase in interstitial and pulmonary vascular markings indicating the presence of pulmonary vascular congestion and cardiac decompensation.    Slightly more confluent opacities identified at the right base superimposed infiltrate cannot be completely excluded  Impression: Changes of pulmonary vascular congestion and cardiac decompensation.    Slightly more confluent opacities in the right infrahilar region and right base infiltrate cannot be completely excluded.    Cardiomegaly    Electronically  signed by: Liban Herrmann  Date:    07/11/2022  Time:    09:03    Recent Labs   Lab 07/11/22  0354 07/12/22 0216   WBC 8.1 8.8   RBC 4.68 4.36   HGB 12.6 11.7*   HCT 42.1 39.7   MCV 90.0 91.1   MCH 26.9* 26.8*   MCHC 29.9* 29.5*   RDW 17.1* 17.1*    236   MPV 11.3* 11.2*       Recent Labs   Lab 07/11/22  0512 07/12/22 0216    144   K 3.8 4.0   CO2 22* 25   BUN 19.7 26.3*   CREATININE 0.90 1.17*   CALCIUM 8.8 9.0   ALBUMIN 3.3* 3.1*   ALKPHOS 76 66   ALT 19 20   AST 19 26   BILITOT 0.5 0.5          Microbiology Results (last 7 days)     Procedure Component Value Units Date/Time    Urine culture [[id number]]  (Abnormal)  (Susceptibility) Collected: 07/11/22 1011    Order Status: Completed Specimen: Urine Updated: 07/13/22 1059     Urine Culture >/= 100,000 colonies/ml Escherichia coli    Blood Culture #1 **CANNOT BE ORDERED STAT** [[id number]]  (Normal) Collected: 07/11/22 0512    Order Status: Completed Specimen: Blood Updated: 07/13/22 0801     CULTURE, BLOOD ([facility]) No Growth At 48 Hours    Blood culture #2 **CANNOT BE ORDERED STAT** [[id number]]  (Normal) Collected: 07/11/22 0512    Order Status: Completed Specimen: Blood Updated: 07/13/22 0801     CULTURE, BLOOD ([facility]) No Growth At 48 Hours           See below for Radiology    Scheduled Med:   amLODIPine  5 mg Oral Daily    aspirin  81 mg Oral Daily    atorvastatin  40 mg Oral QHS    carvediloL  6.25 mg Oral BID    cefTRIAXone (ROCEPHIN) IVPB  2 g Intravenous Q24H    furosemide (LASIX) injection  40 mg Intravenous BID loop    gabapentin  600 mg Oral TID    levothyroxine  25 mcg Oral Before breakfast    losartan  100 mg Oral Daily    nitroGLYCERIN 2% TD oint  1 inch Topical (Top) Q6H    pantoprazole  40 mg Oral Daily    zonisamide  100 mg Oral Nightly        Continuous Infusions:   heparin (porcine) in D5W 10 Units/kg/hr (07/12/22 1422)    nitroGLYCERIN Stopped (07/11/22 2100)        PRN Meds:  dextrose 10%, dextrose 10%, glucagon (human  recombinant), glucose, glucose, heparin (PORCINE), heparin (PORCINE), insulin aspart U-100, naloxone, nitroGLYCERIN, sodium chloride 0.9%     Nutrition Status:  Cardiac diet for now.NPO at midnight.     Assessment/Plan:  Hypertensive emergency   Acute CHF--- HFrEF 25% and HFpEF grade 2 DD-decompensated   Moderate pulmonary hypertension likely 2/2 cardiac   Acute hypoxemic respiratory failure -improved.   Acute uncomplicated cystitis   Non-STEMI    Plan  NSTEMI-- Remains on heparin infusion, repeat PT/INR daily per protocol.   Plan for angiogram in the a.m. thus continue heparin drip for now.   NPO at midnight    Acute onset heart failure--cards on board, continue medication optimization, continue aspirin, statin, BB therapy. Continue diuresis.  Ensure accurate I&O's/daily weights. Monitor renal indices  Will plan for lifevest placement prior to Dc due to EF < 35%    Hypoxia--volume overload vs PNA, Repeat chest xray in the a.m, wean oxygen as tolerated.     UTI--s/p Rocephin; transition to Macrobid today to complete 5day course.   PT/OT as tolerated.      Anticipated discharge and Disposition:  Need life-vest prior to discharge.     All diagnosis and differential diagnosis have been reviewed; assessment and plan has been documented; I have personally reviewed the labs and test results that are presently available; I have reviewed the patients medication list; I have reviewed the consulting providers response and recommendations. I have reviewed or attempted to review medical records based upon their availability    All of the patient's questions have been  addressed and answered. Patient's is agreeable to the above stated plan.   I will continue to monitor closely and make adjustments to medical management as needed.    Kita Milner,    07/13/2022        This note was created with the assistance of Dragon voice recognition software. There may be transcription errors as a result of using this technology however  minimal. Effort has been made to assure accuracy of transcription but any obvious errors or omissions should be clarified with the author of the document.

## 2022-07-13 NOTE — NURSING
Attempted to call cath lab to schedule LHC per order beginning @1620. 4 attempts made and no answer.

## 2022-07-13 NOTE — PROGRESS NOTES
[facility] -   Cardiology  Progress Note    Patient Name: Porsha Horan  MRN: [id number]  Admission Date: 7/11/2022  Hospital Length of Stay: 2 days  Code Status: Full Code   Attending Provider: Veronica Dorsey MD   Consulting Provider: CHARITO Pisano  Primary Care Physician: Jayy Allen MD  Principal Problem:Flash pulmonary edema    Patient information was obtained from patient and ER records.     Subjective:     HPI:  Ms. Horan is an 85 y/o female, remotely followed by Dr. Macedo- last seen 04/2021, with PMHx significant for PAD, JAYLA, HTN, T2DM with CKD 2 who presented to ED via EMS with c/o sudden onset of SOB, wheezing, and worsened swelling to BLE last night. BP was elevated at 222/123. She was given Duo-neb, solumedrol, Nitro and placed on CPAP en-route with improvement in symptoms. Labs significant for troponin 0.024-->0.170.  EKG revealing Sr with nonspecific T wave abnormality in anterior lateral leads. UA + nitrites. Echo revealing EF 25%. CIS consulted for New Onset CHF    Hospital Course:  7/12/22: Currently in ICU. NTG drip off. BP much improved on oral antihypertensives. BUN/creatinine have bumped. She has been weaned to 1 LPM/NC with good oxygenation  7/13/22: Patient maintaining O2 sats on RA. Denies SOB/CP.     PMH: PAD BLE, HTN, JAYLA, HLD, T2DM with CKD 2  PSH: Peripheral angiogram; partial hysterectomy  Family History: Father- HTN, Cancer-reason for death.  Mother HTN, Cancer- reason for death  Social History: former smoker    Previous Cardiac Diagnostics:   TTE 07/11/22:  LV normal in size with mild concentric hypertrophy and severely decreased systolic function. The estimated EF is 25%. There is severe LV global hypokinesis. Grade II LV diastolic dysfunction. Mild AR. Mild to moderate TR. There is moderate pulmonary HTN. Estimated PASP 52 mmHg. Intermediate CVP 8 mmHg.     CUS 11/11/2020:  1-39% dRICA  40-59% dLICA  Antegrade right and left vertebral artery  flow    Peripheral angiogram 02/04/2021:  100% occluded right SFA  50% Left SFA  S/p PTA/stent R SFA and PTA of right AT artery  Left SFA stenosis to be managed medically    Review of Systems:  Review of Systems   Respiratory: Negative for shortness of breath.    Cardiovascular: Negative for chest pain, palpitations and leg swelling.   Gastrointestinal: Negative.    Genitourinary: Negative.    Neurological: Negative.    Psychiatric/Behavioral: Negative.        Objective:     Vital Signs (Most Recent):  Temp: 98.1 °F (36.7 °C) (07/13/22 1228)  Pulse: 74 (07/13/22 1228)  Resp: 20 (07/13/22 1228)  BP: 132/78 (07/13/22 1228)  SpO2: (!) 93 % (07/13/22 1228) Vital Signs (24h Range):  Temp:  [97.9 °F (36.6 °C)-98.1 °F (36.7 °C)] 98.1 °F (36.7 °C)  Pulse:  [9-78] 74  Resp:  [17-31] 20  SpO2:  [93 %-100 %] 93 %  BP: ()/(50-91) 132/78     Weight: 110 kg (242 lb 8.1 oz)  There is no height or weight on file to calculate BMI.    SpO2: (!) 93 %  O2 Device (Oxygen Therapy): room air      Intake/Output Summary (Last 24 hours) at 7/13/2022 1343  Last data filed at 7/13/2022 0500  Gross per 24 hour   Intake 208 ml   Output 951 ml   Net -743 ml       Lines/Drains/Airways     Drain  Duration           Female External Urinary Catheter 07/11/22 1900 1 day          Peripheral Intravenous Line  Duration                Peripheral IV - Single Lumen 07/11/22 0400 20 G Left Hand 2 days         Peripheral IV - Single Lumen 07/11/22 1500  Anterior;Distal;Right Upper Arm 1 day                  Significant Labs:  Recent Results (from the past 24 hour(s))   Troponin I    Collection Time: 07/12/22  4:13 PM   Result Value Ref Range    Troponin-I 0.968 (H) 0.000 - 0.045 ng/mL   POCT glucose    Collection Time: 07/12/22  9:47 PM   Result Value Ref Range    POCT Glucose 142 (H) 70 - 110 mg/dL   PTT Heparin Monitoring    Collection Time: 07/13/22  4:20 AM   Result Value Ref Range    PTT Heparin Monitor 83.3 (H) 23.2 - 33.7 seconds   POCT glucose     Collection Time: 07/13/22  5:49 AM   Result Value Ref Range    POCT Glucose 130 (H) 70 - 110 mg/dL   POCT glucose    Collection Time: 07/13/22 11:47 AM   Result Value Ref Range    POCT Glucose 105 70 - 110 mg/dL       Significant Imaging:  Imaging Results          X-Ray Chest AP Portable (Final result)  Result time 07/11/22 09:03:50    Final result by Liban Herrmann MD (07/11/22 09:03:50)                 Impression:      Changes of pulmonary vascular congestion and cardiac decompensation.    Slightly more confluent opacities in the right infrahilar region and right base infiltrate cannot be completely excluded.    Cardiomegaly      Electronically signed by: Liban Herrmann  Date:    07/11/2022  Time:    09:03             Narrative:    EXAMINATION:  XR CHEST AP PORTABLE    CLINICAL HISTORY:  Chest Pain;    TECHNIQUE:  Single frontal view of the chest was performed.    COMPARISON:  None    FINDINGS:  Examination reveals mediastinal silhouette to be within normal limits cardiac silhouette is enlarged there is increase in interstitial and pulmonary vascular markings indicating the presence of pulmonary vascular congestion and cardiac decompensation.    Slightly more confluent opacities identified at the right base superimposed infiltrate cannot be completely excluded                                  Physical Exam  HENT:      Mouth/Throat:      Mouth: Mucous membranes are moist.   Cardiovascular:      Rate and Rhythm: Normal rate and regular rhythm.      Heart sounds: Normal heart sounds.   Pulmonary:      Effort: Pulmonary effort is normal. No respiratory distress.      Breath sounds: Normal breath sounds. No rales.   Abdominal:      Palpations: Abdomen is soft.   Musculoskeletal:         General: Normal range of motion.   Skin:     General: Skin is warm.   Neurological:      General: No focal deficit present.      Mental Status: She is alert.   Psychiatric:         Mood and Affect: Mood normal.         Current  Inpatient Medications:    Current Facility-Administered Medications:     amLODIPine tablet 5 mg, 5 mg, Oral, Daily, Pat Caldwell, FNP, 5 mg at 07/13/22 1018    aspirin EC tablet 81 mg, 81 mg, Oral, Daily, Roberto Mason MD, 81 mg at 07/13/22 1019    atorvastatin tablet 40 mg, 40 mg, Oral, QHS, Roberto Mason MD, 40 mg at 07/12/22 2144    carvediloL tablet 6.25 mg, 6.25 mg, Oral, BID, Julio Batres MD, 6.25 mg at 07/13/22 1018    dextrose 10% bolus 125 mL, 12.5 g, Intravenous, PRN, Steph G Romel, AGACNP-BC    dextrose 10% bolus 250 mL, 25 g, Intravenous, PRN, Steph G Romel, AGACNP-BC    furosemide injection 40 mg, 40 mg, Intravenous, BID loop, Julio Batres MD, 40 mg at 07/13/22 0800    gabapentin capsule 600 mg, 600 mg, Oral, TID, Roberto Mason MD, 600 mg at 07/13/22 1018    glucagon (human recombinant) injection 1 mg, 1 mg, Intramuscular, PRN, Steph G Romel, AGACNP-BC    glucose chewable tablet 16 g, 16 g, Oral, PRN, Steph G Romel, AGACNP-BC    glucose chewable tablet 24 g, 24 g, Oral, PRN, Steph G Romel, AGACNP-BC    heparin 25,000 units in dextrose 5% (100 units/ml) IV bolus from bag - ADDITIONAL PRN BOLUS - 30 units/kg (max bolus 4000 units), 30 Units/kg, Intravenous, PRN, Pat E Logan, FNP    heparin 25,000 units in dextrose 5% (100 units/ml) IV bolus from bag - ADDITIONAL PRN BOLUS - 60 units/kg (max bolus 4000 units), 36.4 Units/kg, Intravenous, PRN, Pat E Paulette, FNP    heparin 25,000 units in dextrose 5% 250 mL (100 units/mL) infusion LOW INTENSITY nomogram - [facility], 10 Units/kg/hr, Intravenous, Continuous, Pat Caldwell, FNP, Last Rate: 11 mL/hr at 07/12/22 1422, 10 Units/kg/hr at 07/12/22 1422    insulin aspart U-100 injection 0-5 Units, 0-5 Units, Subcutaneous, QID (AC + HS) PRN, Steph Urena, AGACN-BC, 1 Units at 07/11/22 2137    levothyroxine tablet 25 mcg, 25 mcg, Oral, Before breakfast, Julio Batres MD, 25 mcg at 07/13/22 0550    losartan tablet  100 mg, 100 mg, Oral, Daily, Julio Batres MD, 100 mg at 07/13/22 1018    naloxone 0.4 mg/mL injection 0.02 mg, 0.02 mg, Intravenous, PRN, KAILEE Johnston    nitrofurantoin (macrocrystal-monohydrate) 100 MG capsule 100 mg, 100 mg, Oral, Q12H, Kita Milner,     nitroGLYCERIN 2% TD oint ointment 1 inch, 1 inch, Topical (Top), Q6H, Roberto Mason MD, 1 inch at 07/12/22 1200    nitroGLYCERIN 50 mg in dextrose 5 % 250 mL infusion (TITRATING), 0-400 mcg/min, Intravenous, Continuous, Pat Caldwell, EDGARP, Stopped at 07/11/22 2100    nitroGLYCERIN SL tablet 0.4 mg, 0.4 mg, Sublingual, Q5 Min PRN, Isauro Kinney MD, 0.4 mg at 07/11/22 0319    pantoprazole EC tablet 40 mg, 40 mg, Oral, Daily, Roberto Mason MD, 40 mg at 07/13/22 1018    sodium chloride 0.9% flush 10 mL, 10 mL, Intravenous, Q12H PRN, KAILEE Johnston    zonisamide capsule 100 mg, 100 mg, Oral, Nightly, Roberto Mason MD, 100 mg at 07/12/22 2144         VTE Risk Mitigation (From admission, onward)         Ordered     heparin 25,000 units in dextrose 5% (100 units/ml) IV bolus from bag - ADDITIONAL PRN BOLUS - 60 units/kg (max bolus 4000 units)  As needed (PRN)        Question:  Heparin Infusion Adjustment (DO NOT MODIFY ANSWER)  Answer:  \\[email or web address]\[email or web address]\Images\Pharmacy\HeparinInfusions\heparin LOW INTENSITY nomogram for [facility] [id number].pdf    07/11/22 1320     heparin 25,000 units in dextrose 5% (100 units/ml) IV bolus from bag - ADDITIONAL PRN BOLUS - 30 units/kg (max bolus 4000 units)  As needed (PRN)        Question:  Heparin Infusion Adjustment (DO NOT MODIFY ANSWER)  Answer:  \\[email or web address]\[email or web address]\Images\Pharmacy\HeparinInfusions\heparin LOW INTENSITY nomogram for [facility] [id number].pdf    07/11/22 1320     heparin 25,000 units in dextrose 5% 250 mL (100 units/mL) infusion LOW INTENSITY nomogram - [facility]  Continuous        Question Answer Comment   Begin at (in units/kg/hr) 12    Heparin Infusion Adjustment (DO NOT MODIFY  ANSWER) [email or web address]\Images\Pharmacy\HeparinInfusions\heparin LOW INTENSITY nomogram for [facility] [id number]        07/11/22 1320     IP VTE HIGH RISK PATIENT  Once         07/11/22 0627     Place sequential compression device  Until discontinued         07/11/22 0627                Assessment:   NSTEMI, Type I vs II  --troponin peaked 2.157  Newly diagnosed CMO  --EF 25%  Acute combined systolic and diastolic HF  --.4  --appears compensated  Diastolic dysfunction  --Grade II LV dysfunction  Hypertensive Emergency  --admit /123  --improved  + UTI  --urine culture + GNR  Pneumonia  No hx GIB  hgba1c 6.4    Plan:   Diuresing well. Apperas euvolemic. DC IV lasix. Start lasix 40 mg daily. Ensure accurate I&O's/daily weights. Monitor renal indices  BP improved. Continue GDMT--  losartan and Coreg. Change to entresto prior to DC.   Denies CP. Trend troponins  Continue aspirin, statin, and weight based heparin drip. Will plan for LHC in am if renal indices allow.   Will plan for lifevest placement prior to Dc due to EF < 35%- order placed for casemanager consult      Olamide Knight MD  Cardiology  07/13/2022

## 2022-07-14 LAB
ANION GAP SERPL CALC-SCNC: 12 MEQ/L
APTT PPP: 29.2 SECONDS (ref 23.2–33.7)
BASOPHILS # BLD AUTO: 0.03 X10(3)/MCL (ref 0–0.2)
BASOPHILS NFR BLD AUTO: 0.6 %
BUN SERPL-MCNC: 24.3 MG/DL (ref 9.8–20.1)
CALCIUM SERPL-MCNC: 8.9 MG/DL (ref 8.4–10.2)
CHLORIDE SERPL-SCNC: 105 MMOL/L (ref 98–107)
CHOLEST SERPL-MCNC: 161 MG/DL
CHOLEST/HDLC SERPL: 3 {RATIO} (ref 0–5)
CO2 SERPL-SCNC: 25 MMOL/L (ref 23–31)
CREAT SERPL-MCNC: 1.05 MG/DL (ref 0.55–1.02)
CREAT/UREA NIT SERPL: 23
EOSINOPHIL # BLD AUTO: 0.07 X10(3)/MCL (ref 0–0.9)
EOSINOPHIL NFR BLD AUTO: 1.3 %
ERYTHROCYTE [DISTWIDTH] IN BLOOD BY AUTOMATED COUNT: 17.2 % (ref 11.5–17)
GLUCOSE SERPL-MCNC: 115 MG/DL (ref 82–115)
HCT VFR BLD AUTO: 40.8 % (ref 37–47)
HDLC SERPL-MCNC: 59 MG/DL (ref 35–60)
HGB BLD-MCNC: 12.3 GM/DL (ref 12–16)
IMM GRANULOCYTES # BLD AUTO: 0.04 X10(3)/MCL (ref 0–0.04)
IMM GRANULOCYTES NFR BLD AUTO: 0.8 %
LDLC SERPL CALC-MCNC: 85 MG/DL (ref 50–140)
LYMPHOCYTES # BLD AUTO: 1.24 X10(3)/MCL (ref 0.6–4.6)
LYMPHOCYTES NFR BLD AUTO: 23.6 %
MAGNESIUM SERPL-MCNC: 1.4 MG/DL (ref 1.6–2.6)
MCH RBC QN AUTO: 27 PG (ref 27–31)
MCHC RBC AUTO-ENTMCNC: 30.1 MG/DL (ref 33–36)
MCV RBC AUTO: 89.5 FL (ref 80–94)
MONOCYTES # BLD AUTO: 0.48 X10(3)/MCL (ref 0.1–1.3)
MONOCYTES NFR BLD AUTO: 9.1 %
NEUTROPHILS # BLD AUTO: 3.4 X10(3)/MCL (ref 2.1–9.2)
NEUTROPHILS NFR BLD AUTO: 64.6 %
NRBC BLD AUTO-RTO: 0 %
PHOSPHATE SERPL-MCNC: 3.3 MG/DL (ref 2.3–4.7)
PLATELET # BLD AUTO: 222 X10(3)/MCL (ref 130–400)
PMV BLD AUTO: 11.3 FL (ref 7.4–10.4)
POCT GLUCOSE: 137 MG/DL (ref 70–110)
POCT GLUCOSE: 142 MG/DL (ref 70–110)
POCT GLUCOSE: 90 MG/DL (ref 70–110)
POTASSIUM SERPL-SCNC: 3.9 MMOL/L (ref 3.5–5.1)
RBC # BLD AUTO: 4.56 X10(6)/MCL (ref 4.2–5.4)
SODIUM SERPL-SCNC: 142 MMOL/L (ref 136–145)
TRIGL SERPL-MCNC: 87 MG/DL (ref 37–140)
VLDLC SERPL CALC-MCNC: 17 MG/DL
WBC # SPEC AUTO: 5.3 X10(3)/MCL (ref 4.5–11.5)

## 2022-07-14 PROCEDURE — 25000003 PHARM REV CODE 250: Performed by: INTERNAL MEDICINE

## 2022-07-14 PROCEDURE — 83735 ASSAY OF MAGNESIUM: CPT | Performed by: STUDENT IN AN ORGANIZED HEALTH CARE EDUCATION/TRAINING PROGRAM

## 2022-07-14 PROCEDURE — 25500020 PHARM REV CODE 255: Performed by: INTERNAL MEDICINE

## 2022-07-14 PROCEDURE — 25000003 PHARM REV CODE 250: Performed by: STUDENT IN AN ORGANIZED HEALTH CARE EDUCATION/TRAINING PROGRAM

## 2022-07-14 PROCEDURE — 97116 GAIT TRAINING THERAPY: CPT | Mod: CQ

## 2022-07-14 PROCEDURE — 27000221 HC OXYGEN, UP TO 24 HOURS

## 2022-07-14 PROCEDURE — 80061 LIPID PANEL: CPT | Performed by: NURSE PRACTITIONER

## 2022-07-14 PROCEDURE — 25000003 PHARM REV CODE 250: Performed by: NURSE PRACTITIONER

## 2022-07-14 PROCEDURE — 84100 ASSAY OF PHOSPHORUS: CPT | Performed by: STUDENT IN AN ORGANIZED HEALTH CARE EDUCATION/TRAINING PROGRAM

## 2022-07-14 PROCEDURE — C1894 INTRO/SHEATH, NON-LASER: HCPCS | Performed by: INTERNAL MEDICINE

## 2022-07-14 PROCEDURE — 63600175 PHARM REV CODE 636 W HCPCS: Performed by: INTERNAL MEDICINE

## 2022-07-14 PROCEDURE — 80048 BASIC METABOLIC PNL TOTAL CA: CPT | Performed by: STUDENT IN AN ORGANIZED HEALTH CARE EDUCATION/TRAINING PROGRAM

## 2022-07-14 PROCEDURE — 93458 L HRT ARTERY/VENTRICLE ANGIO: CPT | Performed by: INTERNAL MEDICINE

## 2022-07-14 PROCEDURE — 36415 COLL VENOUS BLD VENIPUNCTURE: CPT | Performed by: INTERNAL MEDICINE

## 2022-07-14 PROCEDURE — C1760 CLOSURE DEV, VASC: HCPCS | Performed by: INTERNAL MEDICINE

## 2022-07-14 PROCEDURE — 99152 MOD SED SAME PHYS/QHP 5/>YRS: CPT | Performed by: INTERNAL MEDICINE

## 2022-07-14 PROCEDURE — 85730 THROMBOPLASTIN TIME PARTIAL: CPT | Performed by: INTERNAL MEDICINE

## 2022-07-14 PROCEDURE — 85025 COMPLETE CBC W/AUTO DIFF WBC: CPT | Performed by: NURSE PRACTITIONER

## 2022-07-14 PROCEDURE — 11000001 HC ACUTE MED/SURG PRIVATE ROOM

## 2022-07-14 PROCEDURE — 27201423 OPTIME MED/SURG SUP & DEVICES STERILE SUPPLY: Performed by: INTERNAL MEDICINE

## 2022-07-14 DEVICE — ANGIO-SEAL VIP VASCULAR CLOSURE DEVICE
Type: IMPLANTABLE DEVICE | Site: GROIN | Status: FUNCTIONAL
Brand: ANGIO-SEAL

## 2022-07-14 RX ORDER — ONDANSETRON 4 MG/1
8 TABLET, ORALLY DISINTEGRATING ORAL EVERY 8 HOURS PRN
Status: DISCONTINUED | OUTPATIENT
Start: 2022-07-14 | End: 2022-07-17 | Stop reason: HOSPADM

## 2022-07-14 RX ORDER — ENOXAPARIN SODIUM 100 MG/ML
40 INJECTION SUBCUTANEOUS EVERY 24 HOURS
Status: DISCONTINUED | OUTPATIENT
Start: 2022-07-14 | End: 2022-07-14

## 2022-07-14 RX ORDER — CEFAZOLIN SODIUM 1 G/3ML
INJECTION, POWDER, FOR SOLUTION INTRAMUSCULAR; INTRAVENOUS
Status: DISCONTINUED | OUTPATIENT
Start: 2022-07-14 | End: 2022-07-17 | Stop reason: HOSPADM

## 2022-07-14 RX ORDER — FENTANYL CITRATE 50 UG/ML
INJECTION, SOLUTION INTRAMUSCULAR; INTRAVENOUS
Status: DISCONTINUED | OUTPATIENT
Start: 2022-07-14 | End: 2022-07-17 | Stop reason: HOSPADM

## 2022-07-14 RX ORDER — HEPARIN SODIUM,PORCINE/D5W 25000/250
10 INTRAVENOUS SOLUTION INTRAVENOUS CONTINUOUS
Status: DISCONTINUED | OUTPATIENT
Start: 2022-07-14 | End: 2022-07-15

## 2022-07-14 RX ORDER — MIDAZOLAM HYDROCHLORIDE 1 MG/ML
INJECTION INTRAMUSCULAR; INTRAVENOUS
Status: DISCONTINUED | OUTPATIENT
Start: 2022-07-14 | End: 2022-07-17 | Stop reason: HOSPADM

## 2022-07-14 RX ORDER — LIDOCAINE HYDROCHLORIDE 20 MG/ML
INJECTION, SOLUTION EPIDURAL; INFILTRATION; INTRACAUDAL; PERINEURAL
Status: DISCONTINUED | OUTPATIENT
Start: 2022-07-14 | End: 2022-07-17 | Stop reason: HOSPADM

## 2022-07-14 RX ORDER — HYDRALAZINE HYDROCHLORIDE 20 MG/ML
INJECTION INTRAMUSCULAR; INTRAVENOUS
Status: DISCONTINUED | OUTPATIENT
Start: 2022-07-14 | End: 2022-07-17 | Stop reason: HOSPADM

## 2022-07-14 RX ADMIN — GABAPENTIN 600 MG: 300 CAPSULE ORAL at 09:07

## 2022-07-14 RX ADMIN — ZONISAMIDE 100 MG: 100 CAPSULE ORAL at 08:07

## 2022-07-14 RX ADMIN — GABAPENTIN 600 MG: 300 CAPSULE ORAL at 03:07

## 2022-07-14 RX ADMIN — ASPIRIN 81 MG: 81 TABLET, COATED ORAL at 09:07

## 2022-07-14 RX ADMIN — FUROSEMIDE 40 MG: 40 TABLET ORAL at 08:07

## 2022-07-14 RX ADMIN — CARVEDILOL 6.25 MG: 3.12 TABLET, FILM COATED ORAL at 08:07

## 2022-07-14 RX ADMIN — CARVEDILOL 6.25 MG: 3.12 TABLET, FILM COATED ORAL at 09:07

## 2022-07-14 RX ADMIN — NITROGLYCERIN 1 INCH: 20 OINTMENT TOPICAL at 02:07

## 2022-07-14 RX ADMIN — AMLODIPINE BESYLATE 5 MG: 5 TABLET ORAL at 09:07

## 2022-07-14 RX ADMIN — ATORVASTATIN CALCIUM 40 MG: 40 TABLET, FILM COATED ORAL at 08:07

## 2022-07-14 RX ADMIN — NITROFURANTOIN (MONOHYDRATE/MACROCRYSTALS) 100 MG: 75; 25 CAPSULE ORAL at 08:07

## 2022-07-14 RX ADMIN — PANTOPRAZOLE SODIUM 40 MG: 40 TABLET, DELAYED RELEASE ORAL at 09:07

## 2022-07-14 RX ADMIN — GABAPENTIN 600 MG: 300 CAPSULE ORAL at 08:07

## 2022-07-14 RX ADMIN — LOSARTAN POTASSIUM 100 MG: 50 TABLET, FILM COATED ORAL at 09:07

## 2022-07-14 NOTE — PROGRESS NOTES
[facility]  Hospital Medicine Progress Note        Chief Complaint: Inpatient Follow-up    HPI:   86-year-old female with significant history of peripheral vascular disease, HTN, carotid artery stenosis, HLD, type 2 diabetes mellitus and chronic kidney disease stage 2. Patient was admitted hospitalist medicine service with complaints of dyspnea, wheezing, bilateral lower extremity swelling.  Patient was significantly hypertensive in the ED.  lab significant for elevated troponins.  EKG with nonspecific T wave abnormality.  Patient was admitted to hospitalist medicine service for suspected acute decompensated heart failure and CIS was consulted.  Echocardiogram ordered an ejection fraction came back 25% age.  CIS planning for ischemic evaluation once she is euvolemic.  New tentatively scheduled for 7/14.  Patient also diagnosed with UTI for which antibiotics switched to Macrobid per culture and sensitivities.    Interval Hx:   Patient seen at bedside today morning.  Comfortably sitting in the couch.  She is NPO awaiting left heart catheterization today.  No new complaints.  She is on heparin GTT    Objective/physical exam:  General: In no acute distress, afebrile  Chest: Clear to auscultation bilaterally  Heart: S1, S2, no appreciable murmur  Abdomen: Soft, nontender, BS +  MSK: Warm, no lower extremity edema, no clubbing or cyanosis  Neurologic: Alert and oriented x4, moving all extremities with good strength     VITAL SIGNS: 24 HRS MIN & MAX LAST   Temp  Min: 97.6 °F (36.4 °C)  Max: 98.1 °F (36.7 °C) 98 °F (36.7 °C)   BP  Min: 123/75  Max: 152/81 (!) 143/72   Pulse  Min: 71  Max: 85  72   Resp  Min: 18  Max: 20 18   SpO2  Min: 93 %  Max: 98 % 95 %       Recent Labs   Lab 07/14/22  0446   WBC 5.3   RBC 4.56   HGB 12.3   HCT 40.8   MCV 89.5   MCH 27.0   MCHC 30.1*   RDW 17.2*      MPV 11.3*         Recent Labs   Lab 07/12/22  0216 07/13/22  1411 07/14/22  0446      < > 142    K 4.0   < > 3.9   CO2 25   < > 25   BUN 26.3*   < > 24.3*   CREATININE 1.17*   < > 1.05*   CALCIUM 9.0   < > 8.9   MG  --   --  1.40*   ALBUMIN 3.1*  --   --    ALKPHOS 66  --   --    ALT 20  --   --    AST 26  --   --    BILITOT 0.5  --   --     < > = values in this interval not displayed.          Microbiology Results (last 7 days)     Procedure Component Value Units Date/Time    Blood culture #2 **CANNOT BE ORDERED STAT** [[id number]]  (Normal) Collected: 07/11/22 0512    Order Status: Completed Specimen: Blood Updated: 07/14/22 0801     CULTURE, BLOOD ([facility]) No Growth At 72 Hours    Blood Culture #1 **CANNOT BE ORDERED STAT** [[id number]]  (Normal) Collected: 07/11/22 0512    Order Status: Completed Specimen: Blood Updated: 07/14/22 0800     CULTURE, BLOOD ([facility]) No Growth At 72 Hours    Urine culture [[id number]]  (Abnormal)  (Susceptibility) Collected: 07/11/22 1011    Order Status: Completed Specimen: Urine Updated: 07/13/22 1059     Urine Culture >/= 100,000 colonies/ml Escherichia coli           Scheduled Med:   amLODIPine  5 mg Oral Daily    aspirin  81 mg Oral Daily    atorvastatin  40 mg Oral QHS    carvediloL  6.25 mg Oral BID    furosemide  40 mg Oral Daily    gabapentin  600 mg Oral TID    levothyroxine  25 mcg Oral Before breakfast    losartan  100 mg Oral Daily    nitrofurantoin (macrocrystal-monohydrate)  100 mg Oral Q12H    nitroGLYCERIN 2% TD oint  1 inch Topical (Top) Q6H    pantoprazole  40 mg Oral Daily    zonisamide  100 mg Oral Nightly          Assessment/Plan:    NSTEMI-suspected type 1  Newly diagnosed acute decompensated heart failure with ejection fraction-25%  Diastolic dysfunction  Poorly controlled HTN admitted with hypertensive emergency-improved  Acute bacterial UTI secondary to pansensitive E coli   CKD stage 2-stable  HLD  Type 2 diabetes mellitus  History of carotid artery stenosis/PVD   Seizure disorder  Hypothyroidism  Prophylaxis     On heparin GTT for NSTEMI  suspected type 1  Patient diuresing well on Lasix  Lasix was switched to 40 mg p.o. daily as of today  Continue goal-directed medical treatment-aspirin, statin, Coreg, losartan  Cardiology is planning for left heart catheterization today, follow-up results  Will leave it up to Cardiology as far as switching to Entresto  Patient's renal function is decent  Also will need LifeVest, await Cardiology recommendation  Initial chest x-ray with concerns for pneumonia, less likely  More likely this was pulmonary vascular congestion  Repeat chest x-ray with improved findings  Patient on Macrobid for E coli UTI  Continue other home meds-amlodipine, gabapentin, levothyroxine, ppi, zonisamide  DVT prophylaxis-add Lovenox    Critical care time-35 minutes  Critical care diagnosis-NSTEMI requiring heparin GTT  Critical care interventions: Hands-on evaluation, review of labs/radiographs/records and discussions with patient    Rosalie Nguyen MD   07/14/2022

## 2022-07-14 NOTE — PT/OT/SLP PROGRESS
Occupational Therapy      Patient Name:  Porsha Horan   MRN:  [id number]    Patient not seen today secondary to Off the floor for procedure/surgery in cath lab. Will follow-up as able.    7/14/2022

## 2022-07-14 NOTE — PT/OT/SLP PROGRESS
Physical Therapy Treatment    Patient Name:  Porsha Horan   MRN:  [id number]    Recommendations:     Discharge Recommendations:  home, home health PT   Discharge Equipment Recommendations: none   Barriers to discharge: Medical condition\    Assessment:     Porsha Horan is a 86 y.o. female admitted with a medical diagnosis of Flash pulmonary edema.  She presents with the following impairments/functional limitations:    .    Pt did well with tx. O2 remained in low 90's while amb on RA.    Rehab Prognosis: Good; patient would benefit from acute skilled PT services to address these deficits and reach maximum level of function.    Recent Surgery: Procedure(s) (LRB):  Left heart cath (N/A)      Plan:     During this hospitalization, patient to be seen 5 x/week to address the identified rehab impairments via gait training, therapeutic activities, therapeutic exercises and progress toward the following goals:    · Plan of Care Expires:  08/13/22    Subjective     Chief Complaint: Rknee pain  Patient/Family Comments/goals:   Pain/Comfort:  ·        Objective:     Communicated with RN prior to session.  Patient found up in chair with   upon PT entry to room.     General Precautions: Standard, fall   Orthopedic Precautions:N/A   Braces:    Respiratory Status: Room air     Functional Mobility:  · Transfers:     · Sit to Stand:  minimum assistance with no AD  · Gait: Pt amb 80ft with IV pole in hand. antalgic gait pattern. No LOB noted.       AM-PAC 6 CLICK MOBILITY          Therapeutic Activities and Exercises:   Performed LE therex UIC, 15x1    Patient left up in chair with all lines intact, call button in reach and family present..    GOALS:   Multidisciplinary Problems     Physical Therapy Goals        Problem: Physical Therapy    Goal Priority Disciplines Outcome Goal Variances Interventions   Physical Therapy Goal     PT, PT/OT Ongoing, Progressing     Description: Goals to be met by: 8/13/22     Patient will  increase functional independence with mobility by performin. Sit to stand transfer with Modified [place]  2. Gait  x 150 feet with Modified [place] using Single-point Cane vs none.                      Time Tracking:     PT Received On: 22  PT Start Time: 1035     PT Stop Time: 1052  PT Total Time (min): 17 min     Billable Minutes: Gait Training 17    Treatment Type: Treatment  PT/PTA: PTA     PTA Visit Number: 1     2022

## 2022-07-14 NOTE — INTERVAL H&P NOTE
Patient name: Porsha Horan  MRN: [id number]  : 1935  Cath Lab Procedure H&P Update    Pre-Procedure Assessment:    I saw and examined the patient face to face. The patient has been re-evaluated and her condition is unchanged. The reason for admission, procedure and care is still present.  Based on the patients H&P, pre-procedure physical exam, relevant diagnostic studies, NPO status and information obtained from the patient, I determine the patient is an appropriate candidate for the proposed procedure and anesthesia planned. I further certify the anesthesia risks, benefits and options have been explained to the patient to which she agrees as documented on the procedural consent.

## 2022-07-14 NOTE — PROGRESS NOTES
[facility] -   Cardiology  Progress Note    Patient Name: Porsha Horan  MRN: [id number]  Admission Date: 7/11/2022  Hospital Length of Stay: 3 days  Code Status: Full Code   Attending Provider: Veronica Dorsey MD   Consulting Provider: CHARITO Pisano  Primary Care Physician: Jayy Allen MD  Principal Problem:Flash pulmonary edema    Patient information was obtained from patient and ER records.     Subjective:     HPI:  Ms. Horan is an 85 y/o female, remotely followed by Dr. Macedo- last seen 04/2021, with PMHx significant for PAD, JAYLA, HTN, T2DM with CKD 2 who presented to ED via EMS with c/o sudden onset of SOB, wheezing, and worsened swelling to BLE last night. BP was elevated at 222/123. She was given Duo-neb, solumedrol, Nitro and placed on CPAP en-route with improvement in symptoms. Labs significant for troponin 0.024-->0.170.  EKG revealing Sr with nonspecific T wave abnormality in anterior lateral leads. UA + nitrites. Echo revealing EF 25%. [facility]IS consulted for New Onset CHF    Hospital Course:  7/12/22: Currently in ICU. NTG drip off. BP much improved on oral antihypertensives. BUN/creatinine have bumped. She has been weaned to 1 LPM/NC with good oxygenation  7/13/22: Patient maintaining O2 sats on RA. Denies SOB/CP.   7/14/22: Patient resting in bed. Remains on RA. Denies any complaints.       PMH: PAD BLE, HTN, JAYLA, HLD, T2DM with CKD 2  PSH: Peripheral angiogram; partial hysterectomy  Family History: Father- HTN, Cancer-reason for death.  Mother HTN, Cancer- reason for death  Social History: former smoker    Previous Cardiac Diagnostics:   TTE 07/11/22:  LV normal in size with mild concentric hypertrophy and severely decreased systolic function. The estimated EF is 25%. There is severe LV global hypokinesis. Grade II LV diastolic dysfunction. Mild AR. Mild to moderate TR. There is moderate pulmonary HTN. Estimated PASP 52 mmHg. Intermediate CVP 8 mmHg.     CUS  11/11/2020:  1-39% dRICA  40-59% dLICA  Antegrade right and left vertebral artery flow    Peripheral angiogram 02/04/2021:  100% occluded right SFA  50% Left SFA  S/p PTA/stent R SFA and PTA of right AT artery  Left SFA stenosis to be managed medically    Review of Systems:  Review of Systems   Respiratory: Negative for shortness of breath.    Cardiovascular: Negative for chest pain, palpitations and leg swelling.   Gastrointestinal: Negative.    Genitourinary: Negative.    Neurological: Negative.    Psychiatric/Behavioral: Negative.        Objective:     Vital Signs (Most Recent):  Temp: 98 °F (36.7 °C) (07/14/22 0740)  Pulse: 72 (07/14/22 0740)  Resp: 18 (07/14/22 0740)  BP: (!) 143/72 (07/14/22 0740)  SpO2: 95 % (07/14/22 0740) Vital Signs (24h Range):  Temp:  [97.6 °F (36.4 °C)-98.1 °F (36.7 °C)] 98 °F (36.7 °C)  Pulse:  [9-85] 72  Resp:  [18-20] 18  SpO2:  [93 %-98 %] 95 %  BP: (123-152)/(68-81) 143/72     Weight: 110 kg (242 lb 8.1 oz)  There is no height or weight on file to calculate BMI.    SpO2: 95 %  O2 Device (Oxygen Therapy): room air      Intake/Output Summary (Last 24 hours) at 7/14/2022 0759  Last data filed at 7/13/2022 1400  Gross per 24 hour   Intake 720 ml   Output 1525 ml   Net -805 ml       Lines/Drains/Airways     Drain  Duration           Female External Urinary Catheter 07/11/22 1900 2 days          Peripheral Intravenous Line  Duration                Peripheral IV - Single Lumen 07/11/22 0400 20 G Left Hand 3 days         Peripheral IV - Single Lumen 07/11/22 1500  Anterior;Distal;Right Upper Arm 2 days                  Significant Labs:  Recent Results (from the past 24 hour(s))   POCT glucose    Collection Time: 07/13/22 11:47 AM   Result Value Ref Range    POCT Glucose 105 70 - 110 mg/dL   Basic Metabolic Panel    Collection Time: 07/13/22  2:11 PM   Result Value Ref Range    Sodium Level 142 136 - 145 mmol/L    Potassium Level 3.3 (L) 3.5 - 5.1 mmol/L    Chloride 99 98 - 107 mmol/L     Carbon Dioxide 34 (H) 23 - 31 mmol/L    Glucose Level 128 (H) 82 - 115 mg/dL    Blood Urea Nitrogen 28.2 (H) 9.8 - 20.1 mg/dL    Creatinine 1.27 (H) 0.55 - 1.02 mg/dL    BUN/Creatinine Ratio 22     Calcium Level Total 9.1 8.4 - 10.2 mg/dL    Estimated GFR- 51 mls/min/1.73/m2    Anion Gap 9.0 mEq/L   CBC with Differential    Collection Time: 07/13/22  2:11 PM   Result Value Ref Range    WBC 5.7 4.5 - 11.5 x10(3)/mcL    RBC 4.69 4.20 - 5.40 x10(6)/mcL    Hgb 12.6 12.0 - 16.0 gm/dL    Hct 42.4 37.0 - 47.0 %    MCV 90.4 80.0 - 94.0 fL    MCH 26.9 (L) 27.0 - 31.0 pg    MCHC 29.7 (L) 33.0 - 36.0 mg/dL    RDW 17.2 (H) 11.5 - 17.0 %    Platelet 218 130 - 400 x10(3)/mcL    MPV 11.0 (H) 7.4 - 10.4 fL    Neut % 64.9 %    Lymph % 24.9 %    Mono % 7.9 %    Eos % 1.1 %    Basophil % 0.7 %    Lymph # 1.41 0.6 - 4.6 x10(3)/mcL    Neut # 3.7 2.1 - 9.2 x10(3)/mcL    Mono # 0.45 0.1 - 1.3 x10(3)/mcL    Eos # 0.06 0 - 0.9 x10(3)/mcL    Baso # 0.04 0 - 0.2 x10(3)/mcL    IG# 0.03 0 - 0.04 x10(3)/mcL    IG% 0.5 %    NRBC% 0.0 %   POCT glucose    Collection Time: 07/13/22  8:48 PM   Result Value Ref Range    POCT Glucose 204 (H) 70 - 110 mg/dL   PTT Heparin Monitoring    Collection Time: 07/14/22  4:46 AM   Result Value Ref Range    PTT Heparin Monitor 29.2 23.2 - 33.7 seconds   Basic Metabolic Panel    Collection Time: 07/14/22  4:46 AM   Result Value Ref Range    Sodium Level 142 136 - 145 mmol/L    Potassium Level 3.9 3.5 - 5.1 mmol/L    Chloride 105 98 - 107 mmol/L    Carbon Dioxide 25 23 - 31 mmol/L    Glucose Level 115 82 - 115 mg/dL    Blood Urea Nitrogen 24.3 (H) 9.8 - 20.1 mg/dL    Creatinine 1.05 (H) 0.55 - 1.02 mg/dL    BUN/Creatinine Ratio 23     Calcium Level Total 8.9 8.4 - 10.2 mg/dL    Estimated GFR- >60 mls/min/1.73/m2    Anion Gap 12.0 mEq/L   Magnesium    Collection Time: 07/14/22  4:46 AM   Result Value Ref Range    Magnesium Level 1.40 (L) 1.60 - 2.60 mg/dL   Phosphorus    Collection  Time: 07/14/22  4:46 AM   Result Value Ref Range    Phosphorus Level 3.3 2.3 - 4.7 mg/dL   Lipid Panel    Collection Time: 07/14/22  4:46 AM   Result Value Ref Range    Cholesterol Total 161 <=200 mg/dL    HDL Cholesterol 59 35 - 60 mg/dL    Triglyceride 87 37 - 140 mg/dL    Cholesterol/HDL Ratio 3 0 - 5    Very Low Density Lipoprotein 17     LDL Cholesterol 85.00 50.00 - 140.00 mg/dL   CBC with Differential    Collection Time: 07/14/22  4:46 AM   Result Value Ref Range    WBC 5.3 4.5 - 11.5 x10(3)/mcL    RBC 4.56 4.20 - 5.40 x10(6)/mcL    Hgb 12.3 12.0 - 16.0 gm/dL    Hct 40.8 37.0 - 47.0 %    MCV 89.5 80.0 - 94.0 fL    MCH 27.0 27.0 - 31.0 pg    MCHC 30.1 (L) 33.0 - 36.0 mg/dL    RDW 17.2 (H) 11.5 - 17.0 %    Platelet 222 130 - 400 x10(3)/mcL    MPV 11.3 (H) 7.4 - 10.4 fL    Neut % 64.6 %    Lymph % 23.6 %    Mono % 9.1 %    Eos % 1.3 %    Basophil % 0.6 %    Lymph # 1.24 0.6 - 4.6 x10(3)/mcL    Neut # 3.4 2.1 - 9.2 x10(3)/mcL    Mono # 0.48 0.1 - 1.3 x10(3)/mcL    Eos # 0.07 0 - 0.9 x10(3)/mcL    Baso # 0.03 0 - 0.2 x10(3)/mcL    IG# 0.04 0 - 0.04 x10(3)/mcL    IG% 0.8 %    NRBC% 0.0 %   POCT glucose    Collection Time: 07/14/22  5:36 AM   Result Value Ref Range    POCT Glucose 142 (H) 70 - 110 mg/dL       Significant Imaging:  Imaging Results          X-Ray Chest AP Portable (Final result)  Result time 07/11/22 09:03:50    Final result by Liban Herrmann MD (07/11/22 09:03:50)                 Impression:      Changes of pulmonary vascular congestion and cardiac decompensation.    Slightly more confluent opacities in the right infrahilar region and right base infiltrate cannot be completely excluded.    Cardiomegaly      Electronically signed by: Liban Herrmann  Date:    07/11/2022  Time:    09:03             Narrative:    EXAMINATION:  XR CHEST AP PORTABLE    CLINICAL HISTORY:  Chest Pain;    TECHNIQUE:  Single frontal view of the chest was performed.    COMPARISON:  None    FINDINGS:  Examination reveals  mediastinal silhouette to be within normal limits cardiac silhouette is enlarged there is increase in interstitial and pulmonary vascular markings indicating the presence of pulmonary vascular congestion and cardiac decompensation.    Slightly more confluent opacities identified at the right base superimposed infiltrate cannot be completely excluded                              Physical Exam  HENT:      Mouth/Throat:      Mouth: Mucous membranes are moist.   Cardiovascular:      Rate and Rhythm: Normal rate and regular rhythm.      Heart sounds: Normal heart sounds.   Pulmonary:      Effort: Pulmonary effort is normal. No respiratory distress.      Breath sounds: Normal breath sounds. No rales.   Abdominal:      Palpations: Abdomen is soft.   Musculoskeletal:         General: Normal range of motion.   Skin:     General: Skin is warm.   Neurological:      General: No focal deficit present.      Mental Status: She is alert.   Psychiatric:         Mood and Affect: Mood normal.       Current Inpatient Medications:    Current Facility-Administered Medications:     amLODIPine tablet 5 mg, 5 mg, Oral, Daily, Pat Caldwell, EDGARP, 5 mg at 07/13/22 1018    aspirin EC tablet 81 mg, 81 mg, Oral, Daily, Roberto Mason MD, 81 mg at 07/13/22 1019    atorvastatin tablet 40 mg, 40 mg, Oral, QHS, Roberto Mason MD, 40 mg at 07/13/22 2042    carvediloL tablet 6.25 mg, 6.25 mg, Oral, BID, Julio Batres MD, 6.25 mg at 07/13/22 2042    dextrose 10% bolus 125 mL, 12.5 g, Intravenous, PRN, SHANE JohnstonCNP-BC    dextrose 10% bolus 250 mL, 25 g, Intravenous, PRN, SHANE JohnstonCNP-BC    furosemide tablet 40 mg, 40 mg, Oral, Daily, EDGAR PisanoP    gabapentin capsule 600 mg, 600 mg, Oral, TID, Roberto Mason MD, 600 mg at 07/13/22 2042    glucagon (human recombinant) injection 1 mg, 1 mg, Intramuscular, PRN, JM Johnston-BC    glucose chewable tablet 16 g, 16 g, Oral, PRN, Steph Urena,  AGACNP-BC    glucose chewable tablet 24 g, 24 g, Oral, PRN, Steph Urena, AGACNP-BC    heparin 25,000 units in dextrose 5% (100 units/ml) IV bolus from bag - ADDITIONAL PRN BOLUS - 30 units/kg (max bolus 4000 units), 30 Units/kg, Intravenous, PRN, Pat CECILE [place], FNP    heparin 25,000 units in dextrose 5% (100 units/ml) IV bolus from bag - ADDITIONAL PRN BOLUS - 60 units/kg (max bolus 4000 units), 36.4 Units/kg, Intravenous, PRN, Pat E Paulette, FNP    heparin 25,000 units in dextrose 5% 250 mL (100 units/mL) infusion LOW INTENSITY nomogram - [facility], 10 Units/kg/hr, Intravenous, Continuous, EDGAR PisanoP, Last Rate: 11 mL/hr at 07/13/22 1437, 10 Units/kg/hr at 07/13/22 1437    insulin aspart U-100 injection 0-5 Units, 0-5 Units, Subcutaneous, QID (AC + HS) PRN, Steph Urena, AGACNP-BC, 1 Units at 07/11/22 2137    levothyroxine tablet 25 mcg, 25 mcg, Oral, Before breakfast, Julio Batres MD, 25 mcg at 07/13/22 0550    losartan tablet 100 mg, 100 mg, Oral, Daily, Julio Batres MD, 100 mg at 07/13/22 1018    naloxone 0.4 mg/mL injection 0.02 mg, 0.02 mg, Intravenous, PRN, Steph Urena, AGACNP-BC    nitrofurantoin (macrocrystal-monohydrate) 100 MG capsule 100 mg, 100 mg, Oral, Q12H, Kita Milner, , 100 mg at 07/13/22 2043    nitroGLYCERIN 2% TD oint ointment 1 inch, 1 inch, Topical (Top), Q6H, Roberto Mason MD, 1 inch at 07/12/22 1200    nitroGLYCERIN 50 mg in dextrose 5 % 250 mL infusion (TITRATING), 0-400 mcg/min, Intravenous, Continuous, EDGAR PisanoP, Stopped at 07/11/22 2100    nitroGLYCERIN SL tablet 0.4 mg, 0.4 mg, Sublingual, Q5 Min PRN, Isauro Kinney MD, 0.4 mg at 07/11/22 0319    pantoprazole EC tablet 40 mg, 40 mg, Oral, Daily, Roberto Mason MD, 40 mg at 07/13/22 1018    sodium chloride 0.9% flush 10 mL, 10 mL, Intravenous, Q12H PRN, Steph Urena, [facility]    zonisamide capsule 100 mg, 100 mg, Oral, Nightly, Roberto Mason MD, 100 mg at  07/13/22 2043         VTE Risk Mitigation (From admission, onward)         Ordered     heparin 25,000 units in dextrose 5% (100 units/ml) IV bolus from bag - ADDITIONAL PRN BOLUS - 60 units/kg (max bolus 4000 units)  As needed (PRN)        Question:  Heparin Infusion Adjustment (DO NOT MODIFY ANSWER)  Answer:  \\[email or web address]\epic\Images\Pharmacy\HeparinInfusions\heparin LOW INTENSITY nomogram for [facility] [id number].pdf    07/11/22 1320     heparin 25,000 units in dextrose 5% (100 units/ml) IV bolus from bag - ADDITIONAL PRN BOLUS - 30 units/kg (max bolus 4000 units)  As needed (PRN)        Question:  Heparin Infusion Adjustment (DO NOT MODIFY ANSWER)  Answer:  \[facility] [email or web address]\epic\Images\Pharmacy\HeparinInfusions\heparin LOW INTENSITY nomogram for [facility] [id number].pdf    07/11/22 1320     heparin 25,000 units in dextrose 5% 250 mL (100 units/mL) infusion LOW INTENSITY nomogram - [facility]  Continuous        Question Answer Comment   Begin at (in units/kg/hr) 12    Heparin Infusion Adjustment (DO NOT MODIFY ANSWER) \\[email or web address]\[email or web address]\Images\Pharmacy\HeparinInfusions\heparin LOW INTENSITY nomogram for [facility] [id number].pdf        07/11/22 1320     IP VTE HIGH RISK PATIENT  Once         07/11/22 0627     Place sequential compression device  Until discontinued         07/11/22 0627                Assessment:   NSTEMI, Type I vs II  --troponin peaked 2.157  Newly diagnosed CMO  --EF 25%  Acute combined systolic and diastolic HF  --.4  --appears compensated  Diastolic dysfunction  --Grade II LV dysfunction  Hypertensive Emergency  --admit /123  --improved  + UTI  --urine culture + GNR  Pneumonia  No hx GIB  hgba1c 6.4    Plan:   Diuresing well. Appears euvolemic. Continue lasix 40 mg daily. Ensure accurate I&O's/daily weights. Monitor renal indices  BP improved. Continue GDMT--  losartan and Coreg. Change to entresto prior to DC.   Denies CP. Continue aspirin, statin, and weight based heparin drip. Will plan for [facility] today. R/B/A discussed  with patient and she is amenable to proceeding with procedure today. Consent obtained and placed on chart. Will plan for lifevest placement prior to Dc due to EF < 35%- order placed for annalise consult      EMILIA Lauren  Cardiology  07/14/2022

## 2022-07-15 LAB
ALBUMIN SERPL-MCNC: 3.2 GM/DL (ref 3.4–4.8)
ALBUMIN/GLOB SERPL: 1.1 RATIO (ref 1.1–2)
ALP SERPL-CCNC: 72 UNIT/L (ref 40–150)
ALT SERPL-CCNC: 24 UNIT/L (ref 0–55)
AST SERPL-CCNC: 27 UNIT/L (ref 5–34)
BASOPHILS # BLD AUTO: 0.03 X10(3)/MCL (ref 0–0.2)
BASOPHILS NFR BLD AUTO: 0.6 %
BILIRUBIN DIRECT+TOT PNL SERPL-MCNC: 0.7 MG/DL
BUN SERPL-MCNC: 30.7 MG/DL (ref 9.8–20.1)
CALCIUM SERPL-MCNC: 8.8 MG/DL (ref 8.4–10.2)
CHLORIDE SERPL-SCNC: 106 MMOL/L (ref 98–107)
CO2 SERPL-SCNC: 23 MMOL/L (ref 23–31)
CREAT SERPL-MCNC: 1.32 MG/DL (ref 0.55–1.02)
EOSINOPHIL # BLD AUTO: 0.05 X10(3)/MCL (ref 0–0.9)
EOSINOPHIL NFR BLD AUTO: 1 %
ERYTHROCYTE [DISTWIDTH] IN BLOOD BY AUTOMATED COUNT: 16.5 % (ref 11.5–17)
GLOBULIN SER-MCNC: 2.9 GM/DL (ref 2.4–3.5)
GLUCOSE SERPL-MCNC: 149 MG/DL (ref 82–115)
HCT VFR BLD AUTO: 38.2 % (ref 37–47)
HGB BLD-MCNC: 12.2 GM/DL (ref 12–16)
IMM GRANULOCYTES # BLD AUTO: 0.04 X10(3)/MCL (ref 0–0.04)
IMM GRANULOCYTES NFR BLD AUTO: 0.8 %
LYMPHOCYTES # BLD AUTO: 1.05 X10(3)/MCL (ref 0.6–4.6)
LYMPHOCYTES NFR BLD AUTO: 21.6 %
MCH RBC QN AUTO: 27.2 PG (ref 27–31)
MCHC RBC AUTO-ENTMCNC: 31.9 MG/DL (ref 33–36)
MCV RBC AUTO: 85.3 FL (ref 80–94)
MONOCYTES # BLD AUTO: 0.39 X10(3)/MCL (ref 0.1–1.3)
MONOCYTES NFR BLD AUTO: 8 %
NEUTROPHILS # BLD AUTO: 3.3 X10(3)/MCL (ref 2.1–9.2)
NEUTROPHILS NFR BLD AUTO: 68 %
NRBC BLD AUTO-RTO: 0 %
PLATELET # BLD AUTO: 231 X10(3)/MCL (ref 130–400)
PMV BLD AUTO: 11.2 FL (ref 7.4–10.4)
POCT GLUCOSE: 140 MG/DL (ref 70–110)
POTASSIUM SERPL-SCNC: 3.8 MMOL/L (ref 3.5–5.1)
PROT SERPL-MCNC: 6.1 GM/DL (ref 5.8–7.6)
RBC # BLD AUTO: 4.48 X10(6)/MCL (ref 4.2–5.4)
SODIUM SERPL-SCNC: 140 MMOL/L (ref 136–145)
WBC # SPEC AUTO: 4.9 X10(3)/MCL (ref 4.5–11.5)

## 2022-07-15 PROCEDURE — 97530 THERAPEUTIC ACTIVITIES: CPT | Mod: CQ

## 2022-07-15 PROCEDURE — 25000003 PHARM REV CODE 250: Performed by: NURSE PRACTITIONER

## 2022-07-15 PROCEDURE — 11000001 HC ACUTE MED/SURG PRIVATE ROOM

## 2022-07-15 PROCEDURE — 97116 GAIT TRAINING THERAPY: CPT | Mod: CQ

## 2022-07-15 PROCEDURE — 25000003 PHARM REV CODE 250: Performed by: STUDENT IN AN ORGANIZED HEALTH CARE EDUCATION/TRAINING PROGRAM

## 2022-07-15 PROCEDURE — 36415 COLL VENOUS BLD VENIPUNCTURE: CPT | Performed by: INTERNAL MEDICINE

## 2022-07-15 PROCEDURE — 80053 COMPREHEN METABOLIC PANEL: CPT | Performed by: INTERNAL MEDICINE

## 2022-07-15 PROCEDURE — 63600175 PHARM REV CODE 636 W HCPCS: Performed by: INTERNAL MEDICINE

## 2022-07-15 PROCEDURE — 85025 COMPLETE CBC W/AUTO DIFF WBC: CPT | Performed by: INTERNAL MEDICINE

## 2022-07-15 PROCEDURE — 25000003 PHARM REV CODE 250: Performed by: INTERNAL MEDICINE

## 2022-07-15 RX ORDER — FUROSEMIDE 40 MG/1
40 TABLET ORAL DAILY
Qty: 30 TABLET | Refills: 11 | Status: SHIPPED | OUTPATIENT
Start: 2022-07-16 | End: 2022-07-17 | Stop reason: HOSPADM

## 2022-07-15 RX ORDER — CARVEDILOL 6.25 MG/1
6.25 TABLET ORAL 2 TIMES DAILY
Qty: 60 TABLET | Refills: 11 | Status: SHIPPED | OUTPATIENT
Start: 2022-07-15 | End: 2022-07-17 | Stop reason: SDUPTHER

## 2022-07-15 RX ORDER — ENOXAPARIN SODIUM 100 MG/ML
40 INJECTION SUBCUTANEOUS EVERY 24 HOURS
Status: DISCONTINUED | OUTPATIENT
Start: 2022-07-15 | End: 2022-07-17 | Stop reason: HOSPADM

## 2022-07-15 RX ADMIN — GABAPENTIN 600 MG: 300 CAPSULE ORAL at 09:07

## 2022-07-15 RX ADMIN — ZONISAMIDE 100 MG: 100 CAPSULE ORAL at 09:07

## 2022-07-15 RX ADMIN — FUROSEMIDE 40 MG: 40 TABLET ORAL at 08:07

## 2022-07-15 RX ADMIN — LOSARTAN POTASSIUM 100 MG: 50 TABLET, FILM COATED ORAL at 08:07

## 2022-07-15 RX ADMIN — AMLODIPINE BESYLATE 5 MG: 5 TABLET ORAL at 08:07

## 2022-07-15 RX ADMIN — ATORVASTATIN CALCIUM 40 MG: 40 TABLET, FILM COATED ORAL at 09:07

## 2022-07-15 RX ADMIN — LEVOTHYROXINE SODIUM 25 MCG: 25 TABLET ORAL at 05:07

## 2022-07-15 RX ADMIN — NITROFURANTOIN (MONOHYDRATE/MACROCRYSTALS) 100 MG: 75; 25 CAPSULE ORAL at 08:07

## 2022-07-15 RX ADMIN — ASPIRIN 81 MG: 81 TABLET, COATED ORAL at 08:07

## 2022-07-15 RX ADMIN — ENOXAPARIN SODIUM 40 MG: 100 INJECTION SUBCUTANEOUS at 09:07

## 2022-07-15 RX ADMIN — CARVEDILOL 6.25 MG: 3.12 TABLET, FILM COATED ORAL at 08:07

## 2022-07-15 RX ADMIN — PANTOPRAZOLE SODIUM 40 MG: 40 TABLET, DELAYED RELEASE ORAL at 08:07

## 2022-07-15 RX ADMIN — NITROFURANTOIN (MONOHYDRATE/MACROCRYSTALS) 100 MG: 75; 25 CAPSULE ORAL at 09:07

## 2022-07-15 RX ADMIN — GABAPENTIN 600 MG: 300 CAPSULE ORAL at 04:07

## 2022-07-15 RX ADMIN — CARVEDILOL 6.25 MG: 3.12 TABLET, FILM COATED ORAL at 09:07

## 2022-07-15 RX ADMIN — GABAPENTIN 600 MG: 300 CAPSULE ORAL at 08:07

## 2022-07-15 NOTE — PROGRESS NOTES
Nutrition   Progress Note      Recommendations:  1. Continue current Heart Healthy diet as tolerated       Reason for Evaluation:  Identified at risk by screening criteria CHF    Diagnosis:    1. Flash pulmonary edema    2. Chest pain    3. Pneumonia of right lower lobe due to infectious organism    4. Other specified anemias    5. Dyspnea    6. Congestive cardiac failure    7. Heart failure with reduced ejection fraction and diastolic dysfunction    8. NSTEMI (non-ST elevated myocardial infarction)        Relevant Medical History:  No past medical history on file.      Nutrition Diet History:    Factors affecting nutritional intake: none identified at this time    Food / Methodist / Culture Preferences:  None reported a this time       Nutrition Prescription Ordered:    Current Diet Order: Heart Healthy     Appetite:  Good (> 75% - 100% po intake)    PO intake: 75 - 100 %      Labs / Medications / Procedures:    Nutrition Related Medications:statin, lasix, levothyroxine     Nutrition Related Labs: BUN 30.7(H), Crea 1.32(H), Alb 3.2(L),       Anthropometrics:  Height:    Admit Weight:  Weight: 110 kg (242 lb 8.1 oz)  Latest Weight:  100.7 kg (222 lb 0.1 oz)    Wt Readings from Last 5 Encounters:   07/14/22 100.7 kg (222 lb 0.1 oz)     IBW: Unable to obtain at this time  %IBW: Unable to obtain at this time   UBW: Unable to obtain at this time   %Weight Change: N/A  There is no height or weight on file to calculate BMI.     Nutrition Narrative:  7/15: Pt reports great appetite. Denies any nutrition related concerns at this time.     Monitoring and Evaluation:    Nutrition Monitoring and Evaluation:  food and beverage intake and weight change    Nutrition Risk:  Level of Nutrition Risk:  Low  Frequency of Follow up:  Dietitian will f/up within 7 days.

## 2022-07-15 NOTE — PT/OT/SLP PROGRESS
Physical Therapy Treatment    Patient Name:  Porsha Horan   MRN:  [id number]    Recommendations:     Discharge Recommendations:  home, home health PT   Discharge Equipment Recommendations: none   Barriers to discharge: life vest    Assessment:     Porsha Horan is a 86 y.o. female admitted with a medical diagnosis of Flash pulmonary edema.  She presents with the following impairments/functional limitations:    .    Rehab Prognosis: home; patient would benefit from acute skilled PT services to address these deficits and reach maximum level of function.    Recent Surgery: Procedure(s) (LRB):  Left heart cath (N/A) 1 Day Post-Op    Plan:     During this hospitalization, patient to be seen 5 x/week to address the identified rehab impairments via gait training, therapeutic activities, therapeutic exercises and progress toward the following goals:    · Plan of Care Expires:  08/13/22    Subjective     Chief Complaint: knee pain  Patient/Family Comments/goals:   Pain/Comfort:  ·        Objective:     Communicated with RN prior to session.  Patient found HOB elevated with   upon PT entry to room.     General Precautions: Standard, fall   Orthopedic Precautions:N/A   Braces:    Respiratory Status: Room air     Functional Mobility:  · Bed Mobility:     · Supine to Sit: independence  · Transfers:     · Sit to Stand:  modified independence with rolling walker  · Gait: Pt amb 220ft with RW [place].      AM-PAC 6 CLICK MOBILITY          Therapeutic Activities and Exercises:  Performed sit<>stands SBA 3x.       Patient left up in chair with all lines intact, call button in reach and family present..    GOALS:   Multidisciplinary Problems     Physical Therapy Goals        Problem: Physical Therapy    Goal Priority Disciplines Outcome Goal Variances Interventions   Physical Therapy Goal     PT, PT/OT Ongoing, Progressing     Description: Goals to be met by: 8/13/22     Patient will increase functional independence with mobility  by performin. Sit to stand transfer with Modified [place]  2. Gait  x 150 feet with Modified [place] using Single-point Cane vs none.                      Time Tracking:     PT Received On: 07/15/22  PT Start Time: 1205     PT Stop Time: 1231  PT Total Time (min): 26 min     Billable Minutes: Gait Training 18 and Therapeutic Activity 8    Treatment Type: Treatment  PT/PTA: PTA     PTA Visit Number: 2     07/15/2022

## 2022-07-15 NOTE — PLAN OF CARE
Lifevest approved, [facility] will fit today. Order for home health noted. List of providers given. FOC obtained for [facility]. Referral sent via [facility].

## 2022-07-15 NOTE — PROGRESS NOTES
[facility] Hospital Medicine Progress Note        Chief Complaint: Inpatient Follow-up    HPI:   86-year-old female with significant history of peripheral vascular disease, HTN, carotid artery stenosis, HLD, type 2 diabetes mellitus and chronic kidney disease stage 2. Patient was admitted hospitalist medicine service with complaints of dyspnea, wheezing, bilateral lower extremity swelling.  Patient was significantly hypertensive in the ED.  lab significant for elevated troponins.  EKG with nonspecific T wave abnormality.  Patient was admitted to hospitalist medicine service for suspected acute decompensated heart failure and CIS was consulted.  Echocardiogram ordered an ejection fraction came back 25%.  CIS planning for ischemic evaluation once she is euvolemic.  New tentatively scheduled for 7/14.  Patient also diagnosed with UTI for which antibiotics switched to Macrobid per culture and sensitivities.  Cardiology planning for left heart catheterization on 7/14.  On heparin GTT pending left heart catheterization    Interval Hx:   Patient seen at bedside today morning.  Comfortably laying in bed.  No new complaints.  Respiratory status stable.  Awake, alert and oriented.  No chest pain.    Objective/physical exam:  General: In no acute distress, afebrile  Chest: Clear to auscultation bilaterally  Heart: S1, S2, no appreciable murmur  Abdomen: Soft, nontender, BS +  MSK: Warm, no lower extremity edema, no clubbing or cyanosis  Neurologic: Alert and oriented x4, moving all extremities with good strength     VITAL SIGNS: 24 HRS MIN & MAX LAST   Temp  Min: 97.5 °F (36.4 °C)  Max: 99 °F (37.2 °C) 98.4 °F (36.9 °C)   BP  Min: 105/69  Max: 160/77 105/69   Pulse  Min: 68  Max: 90  82   Resp  Min: 17  Max: 20 17   SpO2  Min: 94 %  Max: 98 % 97 %       Recent Labs   Lab 07/15/22  0438   WBC 4.9   RBC 4.48   HGB 12.2   HCT 38.2   MCV 85.3   MCH 27.2   MCHC 31.9*   RDW 16.5      MPV 11.2*          Recent Labs   Lab 07/14/22  0446 07/15/22  0438    140   K 3.9 3.8   CO2 25 23   BUN 24.3* 30.7*   CREATININE 1.05* 1.32*   CALCIUM 8.9 8.8   MG 1.40*  --    ALBUMIN  --  3.2*   ALKPHOS  --  72   ALT  --  24   AST  --  27   BILITOT  --  0.7          Microbiology Results (last 7 days)     Procedure Component Value Units Date/Time    Blood Culture #1 **CANNOT BE ORDERED STAT** [[id number]]  (Normal) Collected: 07/11/22 0512    Order Status: Completed Specimen: Blood Updated: 07/15/22 0802     CULTURE, BLOOD ([facility]) No Growth At 96 Hours    Blood culture #2 **CANNOT BE ORDERED STAT** [[id number]]  (Normal) Collected: 07/11/22 0512    Order Status: Completed Specimen: Blood Updated: 07/15/22 0802     CULTURE, BLOOD ([facility]) No Growth At 96 Hours    Urine culture [[id number]]  (Abnormal)  (Susceptibility) Collected: 07/11/22 1011    Order Status: Completed Specimen: Urine Updated: 07/13/22 1059     Urine Culture >/= 100,000 colonies/ml Escherichia coli           Scheduled Med:   amLODIPine  5 mg Oral Daily    aspirin  81 mg Oral Daily    atorvastatin  40 mg Oral QHS    carvediloL  6.25 mg Oral BID    furosemide  40 mg Oral Daily    gabapentin  600 mg Oral TID    levothyroxine  25 mcg Oral Before breakfast    nitrofurantoin (macrocrystal-monohydrate)  100 mg Oral Q12H    nitroGLYCERIN 2% TD oint  1 inch Topical (Top) Q6H    pantoprazole  40 mg Oral Daily    [START ON 7/16/2022] sacubitriL-valsartan  1 tablet Oral BID    zonisamide  100 mg Oral Nightly          Assessment/Plan:    NSTEMI  Newly diagnosed acute decompensated heart failure with ejection fraction-25%  Diastolic dysfunction  Poorly controlled HTN admitted with hypertensive emergency-improved  Acute bacterial UTI secondary to pansensitive E coli   CKD stage 2-stable  HLD  Type 2 diabetes mellitus  History of carotid artery stenosis/PVD   Seizure disorder  Hypothyroidism  Prophylaxis     Left heart catheterization with patent coronary  Heparin  GTT discontinued  Medications optimized by Cardiology and Cardiology also consulted case management for LifeVest  Patient is on aspirin, statin, Coreg  Losartan switched to Entresto  Latest switched to 40 mg p.o. daily  Volume status stable, appears compensated  Will be cautious with Entresto given compromised renal function  Creatinine is 1.32  Initial chest x-ray with concerns for pneumonia, less likely  More likely this was pulmonary vascular congestion  Repeat chest x-ray with improved findings  Patient on Macrobid for E coli UTI  Continue other home meds-amlodipine, gabapentin, levothyroxine, ppi, zonisamide  DVT prophylaxis-Lovenox    Plan DC home once life vest  arranged  Rosalie Nguyen MD   07/15/2022

## 2022-07-15 NOTE — PROGRESS NOTES
[facility] -   Cardiology  Progress Note    Patient Name: Porsha Horan  MRN: [id number]  Admission Date: 7/11/2022  Hospital Length of Stay: 4 days  Code Status: Full Code   Attending Provider: Veronica Dorsey MD   Consulting Provider: CHARITO Pisano  Primary Care Physician: Jayy Allen MD  Principal Problem:Flash pulmonary edema    Patient information was obtained from patient and ER records.     Subjective:     HPI:  Ms. Horan is an 87 y/o female, remotely followed by Dr. Macedo- last seen 04/2021, with PMHx significant for PAD, JAYLA, HTN, T2DM with CKD 2 who presented to ED via EMS with c/o sudden onset of SOB, wheezing, and worsened swelling to BLE last night. BP was elevated at 222/123. She was given Duo-neb, solumedrol, Nitro and placed on CPAP en-route with improvement in symptoms. Labs significant for troponin 0.024-->0.170.  EKG revealing Sr with nonspecific T wave abnormality in anterior lateral leads. UA + nitrites. Echo revealing EF 25%. CIS consulted for New Onset CHF    Hospital Course:  7/12/22: Currently in ICU. NTG drip off. BP much improved on oral antihypertensives. BUN/creatinine have bumped. She has been weaned to 1 LPM/NC with good oxygenation  7/13/22: Patient maintaining O2 sats on RA. Denies SOB/CP.   7/14/22: Patient resting in bed. Remains on RA. Denies any complaints.   7/15/22: Patient resting in bed. NAD. Sp [facility] yesterday with normal coronaries. Right groin site benign      PMH: PAD BLE, HTN, JAYLA, HLD, T2DM with CKD 2  PSH: Peripheral angiogram; partial hysterectomy  Family History: Father- HTN, Cancer-reason for death.  Mother HTN, Cancer- reason for death  Social History: former smoker    Previous Cardiac Diagnostics:   [facility] 07/14/22:  Widely patent coronary anatomy with no evidence of obstructive CAD. Ventriculogram not performed. LVEDP was normal at 5-10 mmHg. Angio seal successfully deployed in Right femoral artery following removal of 5 [place]  sheath    TTE 07/11/22:  LV normal in size with mild concentric hypertrophy and severely decreased systolic function. The estimated EF is 25%. There is severe LV global hypokinesis. Grade II LV diastolic dysfunction. Mild AR. Mild to moderate TR. There is moderate pulmonary HTN. Estimated PASP 52 mmHg. Intermediate CVP 8 mmHg.     CUS 11/11/2020:  1-39% dRICA  40-59% dLICA  Antegrade right and left vertebral artery flow    Peripheral angiogram 02/04/2021:  100% occluded right SFA  50% Left SFA  S/p PTA/stent R SFA and PTA of right AT artery  Left SFA stenosis to be managed medically    Review of Systems:  Review of Systems   Respiratory: Negative for shortness of breath.    Cardiovascular: Negative for chest pain, palpitations and leg swelling.   Gastrointestinal: Negative.    Genitourinary: Negative.    Neurological: Negative.    Psychiatric/Behavioral: Negative.        Objective:     Vital Signs (Most Recent):  Temp: 98.2 °F (36.8 °C) (07/15/22 0442)  Pulse: 85 (07/15/22 0442)  Resp: 18 (07/15/22 0442)  BP: 122/73 (07/15/22 0442)  SpO2: (!) 94 % (07/15/22 0442) Vital Signs (24h Range):  Temp:  [97.5 °F (36.4 °C)-99 °F (37.2 °C)] 98.2 °F (36.8 °C)  Pulse:  [68-90] 85  Resp:  [18-20] 18  SpO2:  [94 %-98 %] 94 %  BP: (122-160)/(68-84) 122/73     Weight: 100.7 kg (222 lb 0.1 oz)  There is no height or weight on file to calculate BMI.    SpO2: (!) 94 %  O2 Device (Oxygen Therapy): room air      Intake/Output Summary (Last 24 hours) at 7/15/2022 0634  Last data filed at 7/15/2022 0600  Gross per 24 hour   Intake 120 ml   Output 1950 ml   Net -1830 ml       Lines/Drains/Airways     Drain  Duration           Female External Urinary Catheter 07/11/22 1900 3 days          Peripheral Intravenous Line  Duration                Peripheral IV - Single Lumen 07/11/22 0400 20 G Left Hand 4 days         Peripheral IV - Single Lumen 07/11/22 1500  Anterior;Distal;Right Upper Arm 3 days                  Significant Labs:  Recent  Results (from the past 24 hour(s))   POCT glucose    Collection Time: 07/14/22  5:01 PM   Result Value Ref Range    POCT Glucose 90 70 - 110 mg/dL   POCT glucose    Collection Time: 07/14/22  8:28 PM   Result Value Ref Range    POCT Glucose 137 (H) 70 - 110 mg/dL   POCT glucose    Collection Time: 07/15/22  4:25 AM   Result Value Ref Range    POCT Glucose 140 (H) 70 - 110 mg/dL   Comprehensive Metabolic Panel    Collection Time: 07/15/22  4:38 AM   Result Value Ref Range    Sodium Level 140 136 - 145 mmol/L    Potassium Level 3.8 3.5 - 5.1 mmol/L    Chloride 106 98 - 107 mmol/L    Carbon Dioxide 23 23 - 31 mmol/L    Glucose Level 149 (H) 82 - 115 mg/dL    Blood Urea Nitrogen 30.7 (H) 9.8 - 20.1 mg/dL    Creatinine 1.32 (H) 0.55 - 1.02 mg/dL    Calcium Level Total 8.8 8.4 - 10.2 mg/dL    Protein Total 6.1 5.8 - 7.6 gm/dL    Albumin Level 3.2 (L) 3.4 - 4.8 gm/dL    Globulin 2.9 2.4 - 3.5 gm/dL    Albumin/Globulin Ratio 1.1 1.1 - 2.0 ratio    Bilirubin Total 0.7 <=1.5 mg/dL    Alkaline Phosphatase 72 40 - 150 unit/L    Alanine Aminotransferase 24 0 - 55 unit/L    Aspartate Aminotransferase 27 5 - 34 unit/L    Estimated GFR- 49 mls/min/1.73/m2   CBC with Differential    Collection Time: 07/15/22  4:38 AM   Result Value Ref Range    WBC 4.9 4.5 - 11.5 x10(3)/mcL    RBC 4.48 4.20 - 5.40 x10(6)/mcL    Hgb 12.2 12.0 - 16.0 gm/dL    Hct 38.2 37.0 - 47.0 %    MCV 85.3 80.0 - 94.0 fL    MCH 27.2 27.0 - 31.0 pg    MCHC 31.9 (L) 33.0 - 36.0 mg/dL    RDW 16.5 11.5 - 17.0 %    Platelet 231 130 - 400 x10(3)/mcL    MPV 11.2 (H) 7.4 - 10.4 fL    Neut % 68.0 %    Lymph % 21.6 %    Mono % 8.0 %    Eos % 1.0 %    Basophil % 0.6 %    Lymph # 1.05 0.6 - 4.6 x10(3)/mcL    Neut # 3.3 2.1 - 9.2 x10(3)/mcL    Mono # 0.39 0.1 - 1.3 x10(3)/mcL    Eos # 0.05 0 - 0.9 x10(3)/mcL    Baso # 0.03 0 - 0.2 x10(3)/mcL    IG# 0.04 0 - 0.04 x10(3)/mcL    IG% 0.8 %    NRBC% 0.0 %       Significant Imaging:  Imaging Results          X-Ray  Chest AP Portable (Final result)  Result time 07/11/22 09:03:50    Final result by Liban Herrmann MD (07/11/22 09:03:50)                 Impression:      Changes of pulmonary vascular congestion and cardiac decompensation.    Slightly more confluent opacities in the right infrahilar region and right base infiltrate cannot be completely excluded.    Cardiomegaly      Electronically signed by: Liban Herrmann  Date:    07/11/2022  Time:    09:03             Narrative:    EXAMINATION:  XR CHEST AP PORTABLE    CLINICAL HISTORY:  Chest Pain;    TECHNIQUE:  Single frontal view of the chest was performed.    COMPARISON:  None    FINDINGS:  Examination reveals mediastinal silhouette to be within normal limits cardiac silhouette is enlarged there is increase in interstitial and pulmonary vascular markings indicating the presence of pulmonary vascular congestion and cardiac decompensation.    Slightly more confluent opacities identified at the right base superimposed infiltrate cannot be completely excluded                              Physical Exam  HENT:      Mouth/Throat:      Mouth: Mucous membranes are moist.   Cardiovascular:      Rate and Rhythm: Normal rate and regular rhythm.      Heart sounds: Normal heart sounds.   Pulmonary:      Effort: Pulmonary effort is normal. No respiratory distress.      Breath sounds: Normal breath sounds. No rales.   Abdominal:      Palpations: Abdomen is soft.   Musculoskeletal:         General: Normal range of motion.   Skin:     General: Skin is warm.   Neurological:      General: No focal deficit present.      Mental Status: She is alert.   Psychiatric:         Mood and Affect: Mood normal.       Current Inpatient Medications:    Current Facility-Administered Medications:     amLODIPine tablet 5 mg, 5 mg, Oral, Daily, CHARITO Pisano, 5 mg at 07/14/22 0913    aspirin EC tablet 81 mg, 81 mg, Oral, Daily, Roberto Mason MD, 81 mg at 07/14/22 0912    atorvastatin tablet  40 mg, 40 mg, Oral, QHS, Roberto Mason MD, 40 mg at 07/14/22 2026    carvediloL tablet 6.25 mg, 6.25 mg, Oral, BID, Julio Batres MD, 6.25 mg at 07/14/22 2026    ceFAZolin injection, , , PRN, Mitul Mabry MD, 1 g at 07/14/22 1513    dextrose 10% bolus 125 mL, 12.5 g, Intravenous, PRN, Steph G Romel, AGACNP-BC    dextrose 10% bolus 250 mL, 25 g, Intravenous, PRN, Steph G Romel, AGACNP-BC    fentaNYL 50 mcg/mL injection, , , PRN, Mitul Mabry MD, 25 mcg at 07/14/22 1510    furosemide tablet 40 mg, 40 mg, Oral, Daily, Pat Caldwell, FNP, 40 mg at 07/14/22 0842    gabapentin capsule 600 mg, 600 mg, Oral, TID, Roberto Mason MD, 600 mg at 07/14/22 2026    glucagon (human recombinant) injection 1 mg, 1 mg, Intramuscular, PRN, Steph G Romel, AGACNP-BC    glucose chewable tablet 16 g, 16 g, Oral, PRN, Steph G Romel, AGACNP-BC    glucose chewable tablet 24 g, 24 g, Oral, PRN, Steph G Romel, AGACNP-BC    heparin 25,000 units in dextrose 5% (100 units/ml) IV bolus from bag - ADDITIONAL PRN BOLUS - 30 units/kg (max bolus 4000 units), 30 Units/kg, Intravenous, PRN, Pat Caldwell, FNP, 3,020 Units at 07/14/22 1134    heparin 25,000 units in dextrose 5% (100 units/ml) IV bolus from bag - ADDITIONAL PRN BOLUS - 60 units/kg (max bolus 4000 units), 36.4 Units/kg, Intravenous, PRN, Pat Caldwell, FNP, 60 Units at 07/14/22 1136    heparin 25,000 units in dextrose 5% 250 mL (100 units/mL) infusion LOW INTENSITY nomogram - [facility], 10 Units/kg/hr, Intravenous, Continuous, Pat Caldwell FNP, Stopped at 07/14/22 1455    hydrALAZINE injection, , , PRN, Mitul Mabry MD, 10 mg at 07/14/22 1510    insulin aspart U-100 injection 0-5 Units, 0-5 Units, Subcutaneous, QID (AC + HS) PRN, Steph Urena, AGACNP-BC, 1 Units at 07/11/22 2137    iopamidoL (ISOVUE-370) injection, , , PRN, Mitul Mabry MD, 60 mL at 07/14/22 1517    levothyroxine tablet 25 mcg, 25 mcg, Oral, Before  breakfast, Julio Batres MD, 25 mcg at 07/15/22 0514    LIDOcaine (PF) 20 mg/ml (2%) injection, , , PRN, Mitul Mabry MD, 10 mL at 07/14/22 1500    losartan tablet 100 mg, 100 mg, Oral, Daily, Julio Batres MD, 100 mg at 07/14/22 0911    midazolam (VERSED) 1 mg/mL injection, , , PRN, Mitul Mabry MD, 0.5 mg at 07/14/22 1500    naloxone 0.4 mg/mL injection 0.02 mg, 0.02 mg, Intravenous, PRN, KAILEE Johnston    nitrofurantoin (macrocrystal-monohydrate) 100 MG capsule 100 mg, 100 mg, Oral, Q12H, Kita Milner, , 100 mg at 07/14/22 2026    nitroGLYCERIN 2% TD oint ointment 1 inch, 1 inch, Topical (Top), Q6H, Roberto Mason MD, 1 inch at 07/14/22 1405    nitroGLYCERIN 50 mg in dextrose 5 % 250 mL infusion (TITRATING), 0-400 mcg/min, Intravenous, Continuous, Pat Caldwell, EDGARP, Stopped at 07/11/22 2100    nitroGLYCERIN SL tablet 0.4 mg, 0.4 mg, Sublingual, Q5 Min PRN, Isauro Kinney MD, 0.4 mg at 07/11/22 0319    ondansetron disintegrating tablet 8 mg, 8 mg, Oral, Q8H PRN, CHARITO Ireland    pantoprazole EC tablet 40 mg, 40 mg, Oral, Daily, Roberto Mason MD, 40 mg at 07/14/22 0914    sodium chloride 0.9% flush 10 mL, 10 mL, Intravenous, Q12H PRN, KAILEE Johnston    zonisamide capsule 100 mg, 100 mg, Oral, Nightly, Roberto Mason MD, 100 mg at 07/14/22 2026         VTE Risk Mitigation (From admission, onward)         Ordered     heparin 25,000 units in dextrose 5% 250 mL (100 units/mL) infusion LOW INTENSITY nomogram - [facility]  Continuous        Question Answer Comment   Begin at (in units/kg/hr) 12    Heparin Infusion Adjustment (DO NOT MODIFY ANSWER) \\[email or web address]\[email or web address]\Images\Pharmacy\HeparinInfusions\heparin LOW INTENSITY nomogram for [facility] [id number]        07/14/22 0951     heparin 25,000 units in dextrose 5% (100 units/ml) IV bolus from bag - ADDITIONAL PRN BOLUS - 30 units/kg (max bolus 4000 units)  As needed (PRN)        Question:  Heparin Infusion  Adjustment (DO NOT MODIFY ANSWER)  Answer:  \\[employer]\epic\Images\Pharmacy\HeparinInfusions\heparin LOW INTENSITY nomogram for [facility] [id number].pdf    07/14/22 0952     heparin 25,000 units in dextrose 5% (100 units/ml) IV bolus from bag - ADDITIONAL PRN BOLUS - 60 units/kg (max bolus 4000 units)  As needed (PRN)        Question:  Heparin Infusion Adjustment (DO NOT MODIFY ANSWER)  Answer:  \[employer]\epic\Images\Pharmacy\HeparinInfusions\heparin LOW INTENSITY nomogram for [facility] [id number].pdf    07/14/22 0952     IP VTE HIGH RISK PATIENT  Once         07/11/22 0627     Place sequential compression device  Until discontinued         07/11/22 0627                Assessment:   NSTEMI, Type II  --troponin peaked 2.157  NICMO  --EF 25%  --[facility] 7/14/22 widely patent coronary anatomy  Acute combined systolic and diastolic HF  --.4  --appears compensated  Diastolic dysfunction  --Grade II LV dysfunction  Hypertensive Emergency  --admit /123  --resolved  + UTI  --urine culture + GNR    No hx GIB  hgba1c 6.4    Plan:   Diuresing well. Appears euvolemic. Continue lasix 40 mg daily. Ensure accurate I&O's/daily weights. Patient instructed to obtain daily weights and to call MD for weight gain > 2# overnight or 5# weekly. She was also instructed on low salt diet.   BP improved. Continue GDMT--  Coreg. Change losartan to entresto 48/51mg bid.   Will plan for lifevest placement prior to Dc due to EF < 35%- order placed for casemanager consult    CIS signing off. Reconsult if needed.   Pat Caldwell, EDGARP-C  Cardiology  07/15/2022

## 2022-07-16 LAB
ANION GAP SERPL CALC-SCNC: 11 MEQ/L
BACTERIA BLD CULT: NORMAL
BACTERIA BLD CULT: NORMAL
BUN SERPL-MCNC: 39.5 MG/DL (ref 9.8–20.1)
CALCIUM SERPL-MCNC: 8.6 MG/DL (ref 8.4–10.2)
CHLORIDE SERPL-SCNC: 105 MMOL/L (ref 98–107)
CO2 SERPL-SCNC: 23 MMOL/L (ref 23–31)
CREAT SERPL-MCNC: 1.43 MG/DL (ref 0.55–1.02)
CREAT/UREA NIT SERPL: 28
GLUCOSE SERPL-MCNC: 134 MG/DL (ref 82–115)
POCT GLUCOSE: 131 MG/DL (ref 70–110)
POCT GLUCOSE: 153 MG/DL (ref 70–110)
POTASSIUM SERPL-SCNC: 3.6 MMOL/L (ref 3.5–5.1)
SODIUM SERPL-SCNC: 139 MMOL/L (ref 136–145)

## 2022-07-16 PROCEDURE — 25000003 PHARM REV CODE 250: Performed by: INTERNAL MEDICINE

## 2022-07-16 PROCEDURE — 36415 COLL VENOUS BLD VENIPUNCTURE: CPT | Performed by: INTERNAL MEDICINE

## 2022-07-16 PROCEDURE — 80048 BASIC METABOLIC PNL TOTAL CA: CPT | Performed by: INTERNAL MEDICINE

## 2022-07-16 PROCEDURE — 25000003 PHARM REV CODE 250: Performed by: STUDENT IN AN ORGANIZED HEALTH CARE EDUCATION/TRAINING PROGRAM

## 2022-07-16 PROCEDURE — 25000003 PHARM REV CODE 250: Performed by: NURSE PRACTITIONER

## 2022-07-16 PROCEDURE — 11000001 HC ACUTE MED/SURG PRIVATE ROOM

## 2022-07-16 PROCEDURE — 63600175 PHARM REV CODE 636 W HCPCS: Performed by: INTERNAL MEDICINE

## 2022-07-16 RX ORDER — SODIUM CHLORIDE 9 MG/ML
500 INJECTION, SOLUTION INTRAVENOUS ONCE
Status: COMPLETED | OUTPATIENT
Start: 2022-07-16 | End: 2022-07-16

## 2022-07-16 RX ADMIN — LEVOTHYROXINE SODIUM 25 MCG: 25 TABLET ORAL at 04:07

## 2022-07-16 RX ADMIN — NITROFURANTOIN (MONOHYDRATE/MACROCRYSTALS) 100 MG: 75; 25 CAPSULE ORAL at 11:07

## 2022-07-16 RX ADMIN — SACUBITRIL AND VALSARTAN 1 TABLET: 49; 51 TABLET, FILM COATED ORAL at 09:07

## 2022-07-16 RX ADMIN — AMLODIPINE BESYLATE 5 MG: 5 TABLET ORAL at 07:07

## 2022-07-16 RX ADMIN — SODIUM CHLORIDE 500 ML: 0.9 INJECTION, SOLUTION INTRAVENOUS at 12:07

## 2022-07-16 RX ADMIN — CARVEDILOL 6.25 MG: 3.12 TABLET, FILM COATED ORAL at 07:07

## 2022-07-16 RX ADMIN — ASPIRIN 81 MG: 81 TABLET, COATED ORAL at 07:07

## 2022-07-16 RX ADMIN — GABAPENTIN 600 MG: 300 CAPSULE ORAL at 09:07

## 2022-07-16 RX ADMIN — FUROSEMIDE 40 MG: 40 TABLET ORAL at 07:07

## 2022-07-16 RX ADMIN — GABAPENTIN 600 MG: 300 CAPSULE ORAL at 07:07

## 2022-07-16 RX ADMIN — CARVEDILOL 6.25 MG: 3.12 TABLET, FILM COATED ORAL at 09:07

## 2022-07-16 RX ADMIN — PANTOPRAZOLE SODIUM 40 MG: 40 TABLET, DELAYED RELEASE ORAL at 07:07

## 2022-07-16 RX ADMIN — ZONISAMIDE 100 MG: 100 CAPSULE ORAL at 09:07

## 2022-07-16 RX ADMIN — ATORVASTATIN CALCIUM 40 MG: 40 TABLET, FILM COATED ORAL at 09:07

## 2022-07-16 RX ADMIN — NITROFURANTOIN (MONOHYDRATE/MACROCRYSTALS) 100 MG: 75; 25 CAPSULE ORAL at 09:07

## 2022-07-16 RX ADMIN — ENOXAPARIN SODIUM 40 MG: 100 INJECTION SUBCUTANEOUS at 09:07

## 2022-07-16 NOTE — PROGRESS NOTES
Hospital Medicine  Progress Note    Patient Name: Porsha Horan  MRN: [id number]  Status: IP- Inpatient   Admission Date: 7/11/2022  Length of Stay: 5      CC: hospital follow-up for heart failure       SUBJECTIVE   86-year-old female with a history that includes chronic kidney disease stage 2, presented to ED 7/11 with dyspnea, wheezing, bilateral lower extremity swelling.  Patient was significantly hypertensive in the ED.  Labs significant for elevated troponins.  EKG with nonspecific T wave abnormality.  Patient was admitted to hospitalist medicine service for suspected acute decompensated heart failure and CIS was consulted.  Echocardiogram noted an EF of 25%.  CIS following for ischemic evaluation.  She was diuresed, and continued on heparin infusion.  She was also diagnosed with UTI, antibiotics tailored to Macrobid based on culture and sensitivities.  Underwent LHC 7/14 with Cardiology, noting normal coronary arteries.  Cardiac regimen optimized, lasix transitioned to PO.  Patient fitted with LifeVest.    Today: Patient seen and examined at bedside, and chart reviewed.  Patient without complaints today.  Denies SOB, CP.  She has diuresed well, though renal function continues to bump over last several days, BUN 40, Cr 1.4 today.      MEDICATIONS   Scheduled   amLODIPine  5 mg Oral Daily    aspirin  81 mg Oral Daily    atorvastatin  40 mg Oral QHS    carvediloL  6.25 mg Oral BID    enoxaparin  40 mg Subcutaneous Daily    gabapentin  600 mg Oral TID    levothyroxine  25 mcg Oral Before breakfast    nitrofurantoin (macrocrystal-monohydrate)  100 mg Oral Q12H    pantoprazole  40 mg Oral Daily    sacubitriL-valsartan  1 tablet Oral BID    zonisamide  100 mg Oral Nightly     Continuous Infusions  None    PRN  ceFAZolin, dextrose 10%, dextrose 10%, fentaNYL, glucagon (human recombinant), glucose, glucose, hydrALAZINE, insulin aspart U-100, iopamidoL, LIDOcaine (PF) 20 mg/ml (2%), midazolam, naloxone,  nitroGLYCERIN, ondansetron, sodium chloride 0.9%       PHYSICAL EXAM   VITALS: T 98.3 °F (36.8 °C)   BP (!) 142/67   P 76   RR 18   O2 96 % on room air    GENERAL: Awake and in NAD  LUNGS: CTA anteriorly  CVS: Normal rate  GI/: Soft, NT, bowel sounds positive.  EXTREMITIES: Trace peripheral edema  NEURO: AAOx3  PSYCH: Cooperative      LABS   CBC  Recent Labs     07/15/22  0438   WBC 4.9   HGB 12.2   HCT 38.2         CHEM  Recent Labs     07/14/22  0446 07/15/22  0438 07/16/22  0400    140 139   K 3.9 3.8 3.6   MG 1.40*  --   --    CO2 25 23 23   BUN 24.3* 30.7* 39.5*   CREATININE 1.05* 1.32* 1.43*   CALCIUM 8.9 8.8 8.6   BILITOT  --  0.7  --    AST  --  27  --    ALT  --  24  --    ALKPHOS  --  72  --    ALBUMIN  --  3.2*  --    PHOS 3.3  --   --           MICROBIOLOGY     Microbiology Results (last 7 days)     Procedure Component Value Units Date/Time    Blood Culture #1 **CANNOT BE ORDERED STAT** [[id number]]  (Normal) Collected: 07/11/22 0512    Order Status: Completed Specimen: Blood Updated: 07/16/22 0801     CULTURE, BLOOD ([facility]) No Growth at 5 days    Blood culture #2 **CANNOT BE ORDERED STAT** [[id number]]  (Normal) Collected: 07/11/22 0512    Order Status: Completed Specimen: Blood Updated: 07/16/22 0801     CULTURE, BLOOD ([facility]) No Growth at 5 days    Urine culture [[id number]]  (Abnormal)  (Susceptibility) Collected: 07/11/22 1011    Order Status: Completed Specimen: Urine Updated: 07/13/22 1059     Urine Culture >/= 100,000 colonies/ml Escherichia coli          ASSESSMENT   Newly diagnosed systolic heart failure, acutely decompensated  NSTEMI, type II secondary to above  Hypertensive emergency, improved  E coli UTI   CKD stage II, stable  NIDDM II  h/o Seizure disorder  h/o Hypothyroidism    PLAN   Will hold Lasix today and give 500cc NS over the next 6-7hrs  Repeat renal function in AM  If improved can resume Lasix at reduced dose (20 daily), and possibly discahrge home  Otherwise continue  current management and monitoring in the interim      Prophylaxis: VLADISLAV Friend MD  [facility]  07/16/2022

## 2022-07-17 VITALS
DIASTOLIC BLOOD PRESSURE: 76 MMHG | HEART RATE: 88 BPM | WEIGHT: 218.69 LBS | TEMPERATURE: 98 F | SYSTOLIC BLOOD PRESSURE: 135 MMHG | OXYGEN SATURATION: 98 % | RESPIRATION RATE: 18 BRPM

## 2022-07-17 PROBLEM — J81.0 FLASH PULMONARY EDEMA: Status: RESOLVED | Noted: 2022-07-11 | Resolved: 2022-07-17

## 2022-07-17 PROBLEM — I16.1 HYPERTENSIVE EMERGENCY: Status: RESOLVED | Noted: 2022-07-11 | Resolved: 2022-07-17

## 2022-07-17 LAB
ANION GAP SERPL CALC-SCNC: 11 MEQ/L
BUN SERPL-MCNC: 26.6 MG/DL (ref 9.8–20.1)
CALCIUM SERPL-MCNC: 8.7 MG/DL (ref 8.4–10.2)
CHLORIDE SERPL-SCNC: 108 MMOL/L (ref 98–107)
CO2 SERPL-SCNC: 23 MMOL/L (ref 23–31)
CREAT SERPL-MCNC: 0.99 MG/DL (ref 0.55–1.02)
CREAT/UREA NIT SERPL: 27
GLUCOSE SERPL-MCNC: 157 MG/DL (ref 82–115)
MAGNESIUM SERPL-MCNC: 1.9 MG/DL (ref 1.6–2.6)
POCT GLUCOSE: 143 MG/DL (ref 70–110)
POCT GLUCOSE: 176 MG/DL (ref 70–110)
POTASSIUM SERPL-SCNC: 3.9 MMOL/L (ref 3.5–5.1)
SODIUM SERPL-SCNC: 142 MMOL/L (ref 136–145)

## 2022-07-17 PROCEDURE — 97116 GAIT TRAINING THERAPY: CPT | Mod: CQ

## 2022-07-17 PROCEDURE — 25000003 PHARM REV CODE 250: Performed by: STUDENT IN AN ORGANIZED HEALTH CARE EDUCATION/TRAINING PROGRAM

## 2022-07-17 PROCEDURE — 97530 THERAPEUTIC ACTIVITIES: CPT | Mod: CQ

## 2022-07-17 PROCEDURE — 36415 COLL VENOUS BLD VENIPUNCTURE: CPT | Performed by: INTERNAL MEDICINE

## 2022-07-17 PROCEDURE — 25000003 PHARM REV CODE 250: Performed by: NURSE PRACTITIONER

## 2022-07-17 PROCEDURE — 80048 BASIC METABOLIC PNL TOTAL CA: CPT | Performed by: INTERNAL MEDICINE

## 2022-07-17 PROCEDURE — 83735 ASSAY OF MAGNESIUM: CPT | Performed by: INTERNAL MEDICINE

## 2022-07-17 PROCEDURE — 25000003 PHARM REV CODE 250: Performed by: INTERNAL MEDICINE

## 2022-07-17 RX ORDER — NITROGLYCERIN 0.4 MG/1
0.4 TABLET SUBLINGUAL EVERY 5 MIN PRN
Qty: 30 TABLET | Refills: 0 | Status: SHIPPED | OUTPATIENT
Start: 2022-07-17 | End: 2023-07-17

## 2022-07-17 RX ORDER — AMLODIPINE BESYLATE 5 MG/1
5 TABLET ORAL DAILY
Qty: 30 TABLET | Refills: 0 | Status: SHIPPED | OUTPATIENT
Start: 2022-07-18 | End: 2023-07-18

## 2022-07-17 RX ORDER — FUROSEMIDE 20 MG/1
20 TABLET ORAL DAILY
Status: DISCONTINUED | OUTPATIENT
Start: 2022-07-17 | End: 2022-07-17 | Stop reason: HOSPADM

## 2022-07-17 RX ORDER — CARVEDILOL 6.25 MG/1
6.25 TABLET ORAL 2 TIMES DAILY
Qty: 60 TABLET | Refills: 0 | Status: SHIPPED | OUTPATIENT
Start: 2022-07-17 | End: 2023-07-17

## 2022-07-17 RX ORDER — CLOPIDOGREL BISULFATE 75 MG/1
75 TABLET ORAL DAILY
Status: DISCONTINUED | OUTPATIENT
Start: 2022-07-17 | End: 2022-07-17

## 2022-07-17 RX ORDER — AMLODIPINE BESYLATE 5 MG/1
5 TABLET ORAL DAILY
Qty: 30 TABLET | Refills: 0 | Status: SHIPPED | OUTPATIENT
Start: 2022-07-18 | End: 2022-07-17 | Stop reason: SDUPTHER

## 2022-07-17 RX ORDER — FUROSEMIDE 20 MG/1
20 TABLET ORAL DAILY
Qty: 30 TABLET | Refills: 0 | Status: SHIPPED | OUTPATIENT
Start: 2022-07-17 | End: 2022-07-17 | Stop reason: SDUPTHER

## 2022-07-17 RX ORDER — FUROSEMIDE 20 MG/1
20 TABLET ORAL DAILY
Qty: 30 TABLET | Refills: 0 | Status: SHIPPED | OUTPATIENT
Start: 2022-07-17 | End: 2023-07-17

## 2022-07-17 RX ADMIN — CARVEDILOL 6.25 MG: 3.12 TABLET, FILM COATED ORAL at 09:07

## 2022-07-17 RX ADMIN — LEVOTHYROXINE SODIUM 25 MCG: 25 TABLET ORAL at 06:07

## 2022-07-17 RX ADMIN — NITROFURANTOIN (MONOHYDRATE/MACROCRYSTALS) 100 MG: 75; 25 CAPSULE ORAL at 09:07

## 2022-07-17 RX ADMIN — FUROSEMIDE 20 MG: 20 TABLET ORAL at 03:07

## 2022-07-17 RX ADMIN — GABAPENTIN 600 MG: 300 CAPSULE ORAL at 09:07

## 2022-07-17 RX ADMIN — ASPIRIN 81 MG: 81 TABLET, COATED ORAL at 09:07

## 2022-07-17 RX ADMIN — GABAPENTIN 600 MG: 300 CAPSULE ORAL at 03:07

## 2022-07-17 RX ADMIN — PANTOPRAZOLE SODIUM 40 MG: 40 TABLET, DELAYED RELEASE ORAL at 09:07

## 2022-07-17 RX ADMIN — AMLODIPINE BESYLATE 5 MG: 5 TABLET ORAL at 09:07

## 2022-07-17 RX ADMIN — SACUBITRIL AND VALSARTAN 1 TABLET: 49; 51 TABLET, FILM COATED ORAL at 09:07

## 2022-07-17 NOTE — PLAN OF CARE
Pt will dc today with [facility]. I was unable to print attach information in careport . I faxed dc order , fs, and dc summary to [facility]. I spoke to Kristina with [facility] and updated her on above information.pt does have lifevest on.

## 2022-07-17 NOTE — DISCHARGE SUMMARY
Hospital Medicine  Discharge Summary    Patient Name: Porsha Horan  MRN: [id number]  Admit Date: 7/11/2022  Discharge Date:    Status: IP- Inpatient   Length of Stay: 6      PHYSICIANS   Admitting Physician: Rivas Ortega MD  Discharging Physician: Tyler Friend MD.  Primary Care Physician: Jayy Allen MD  Consults: Cardiology      DISCHARGE DIAGNOSES   Newly diagnosed systolic heart failure, acutely decompensated  NSTEMI, type II secondary to above  Hypertensive emergency, improved  E coli UTI   CKD stage II, stable  NIDDM II  h/o Seizure disorder  h/o Hypothyroidism      PROCEDURES   [facility]      HOSPITAL COURSE    86-year-old female with a history that includes chronic kidney disease stage 2, presented to ED 7/11 with dyspnea, wheezing, bilateral lower extremity swelling.  Patient was significantly hypertensive in the ED.  Labs significant for elevated troponins.  EKG with nonspecific T wave abnormality.  Patient was admitted to hospitalist medicine service for suspected acute decompensated heart failure and [facility] was consulted.  Echocardiogram noted an EF of 25%.  [facility] following for ischemic evaluation.  She was diuresed, and continued on heparin infusion.  She was also diagnosed with UTI, antibiotics tailored to Macrobid based on culture and sensitivities.  Underwent LHC 7/14 with Cardiology, noting normal coronary arteries.  Cardiac regimen optimized, lasix transitioned to PO.  Patient fitted with LifeVest.  However creatinine bumped a bit.  She was given back 500cc of NS overnight and and creatinine has improved, patient currently euvolemic.  Will resume Lasix at 20 mg orally for maintenance therapy.       STATUS  Improved    DISPOSITION  Discharge to home    DIET  Cardiac    ACTIVITY  As tolerated      FOLLOW-UP      Jayy Allen MD Follow up on 7/22/2022.    Specialty: Family Medicine  Why: @ 9:10am  Contact information:  [place] [id number]  [phone]             Basil Davies  MD. Schedule an appointment as soon as possible for a visit in 3 day(s).    Specialty: Internal Medicine  Why: 3-5 days post discharge  Contact information:  [address] [address] [phone]                     DISCHARGE MEDICATION RECONCILIATION     PRESCRIBED   amlodipine 5 MG tablet  Commonly known as: NORVASC  Take 1 tablet (5 mg total) by mouth once daily.     carvediloL 6.25 MG tablet  Commonly known as: COREG  Take 1 tablet (6.25 mg total) by mouth 2 (two) times daily.     furosemide 20 MG tablet  Commonly known as: LASIX  Take 1 tablet (20 mg total) by mouth once daily.     nitroglycerin 0.4 MG SL tablet  Commonly known as: NITROSTAT  Place 1 tablet (0.4 mg total) under the tongue every 5 (five) minutes as needed for Chest pain.     sacubitriL-valsartan 49-51 mg per tablet  Commonly known as: ENTRESTO  Take 1 tablet by mouth 2 (two) times daily.        CONTINUE   aspirin 81 MG EC tablet  Commonly known as: ECOTRIN     atorvastatin 40 MG tablet  Commonly known as: LIPITOR     clopidogreL 75 mg tablet  Commonly known as: PLAVIX     gabapentin 600 MG tablet  Commonly known as: NEURONTIN     levocetirizine 5 MG tablet  Commonly known as: XYZAL     levothyroxine 25 MCG tablet  Commonly known as: SYNTHROID     pantoprazole 40 MG tablet  Commonly known as: PROTONIX     tramadol 50 mg tablet  Commonly known as: ULTRAM     zonisamide 100 MG Cap  Commonly known as: ZONEGRAN        DISCONTINUE    hydrochlorothiazide 25 MG tablet  Commonly known as: HYDRODIURIL     pioglitazone 30 MG tablet  Commonly known as: ACTOS           These medications were sent to   [facility] - [address] [address]    Phone: [phone]   · amLODIPine 5 MG tablet  · carvediloL 6.25 MG tablet  · furosemide 20 MG tablet  · nitroGLYCERIN 0.4 MG SL tablet  · sacubitriL-valsartan 49-51 mg per tablet           PHYSICAL EXAM   VITALS: T 98 °F (36.7 °C)   /76   P 88   RR 18   O2  98 %    GENERAL: Awake and in NAD  LUNGS: CTA anteriorly  CVS: Normal rate  GI/: Soft, NT, bowel sounds positive.  EXTREMITIES: Trace peripheral edema  NEURO: AAOx3  PSYCH: Cooperative        Discharge time: 33 minutes     Tyler Friend MD  [facility]  07/17/2022        DIAGNOSITCS   CBC:   Recent Labs   Lab 07/13/22  1411 07/14/22  0446 07/15/22  0438   WBC 5.7 5.3 4.9   HGB 12.6 12.3 12.2   HCT 42.4 40.8 38.2    222 231     COAG:  Recent Labs   Lab 07/11/22  1334 07/11/22  2033   INR 0.95  --    PTT  --  75.5*     CMP:   Recent Labs   Lab 07/11/22  0512 07/12/22  0216 07/13/22  1411 07/14/22  0446 07/15/22  0438 07/16/22  0400 07/17/22  0529   CALCIUM 8.8 9.0   < > 8.9 8.8 8.6 8.7   ALBUMIN 3.3* 3.1*  --   --  3.2*  --   --     144   < > 142 140 139 142   K 3.8 4.0   < > 3.9 3.8 3.6 3.9   CO2 22* 25   < > 25 23 23 23   BUN 19.7 26.3*   < > 24.3* 30.7* 39.5* 26.6*   CREATININE 0.90 1.17*   < > 1.05* 1.32* 1.43* 0.99   ALKPHOS 76 66  --   --  72  --   --    ALT 19 20  --   --  24  --   --    AST 19 26  --   --  27  --   --    BILITOT 0.5 0.5  --   --  0.7  --   --    MG  --   --   --  1.40*  --   --  1.90   PHOS  --   --   --  3.3  --   --   --     < > = values in this interval not displayed.       Recent Labs     07/14/22  1701 07/14/22  2028 07/15/22  0425 07/16/22  1115 07/16/22  1723 07/17/22  1253   POCTGLUCOSE 90 137* 140* 131* 153* 176*        Microbiology Results (last 7 days)     Procedure Component Value Units Date/Time    Blood Culture #1 **CANNOT BE ORDERED STAT** [[id number]]  (Normal) Collected: 07/11/22 0512    Order Status: Completed Specimen: Blood Updated: 07/16/22 0801     CULTURE, BLOOD ([facility]) No Growth at 5 days    Blood culture #2 **CANNOT BE ORDERED STAT** [[id number]]  (Normal) Collected: 07/11/22 0512    Order Status: Completed Specimen: Blood Updated: 07/16/22 0801     CULTURE, BLOOD ([facility]) No Growth at 5 days    Urine culture [[id number]]  (Abnormal)  (Susceptibility)  Collected: 07/11/22 1011    Order Status: Completed Specimen: Urine Updated: 07/13/22 1059     Urine Culture >/= 100,000 colonies/ml Escherichia coli           X-Ray Chest AP Portable  Result Date: 7/11/2022  Impression:  Changes of pulmonary vascular congestion and cardiac decompensation. Slightly more confluent opacities in the right infrahilar region and right base infiltrate cannot be completely excluded. Cardiomegaly    X-Ray Chest 1 View  Result Date: 7/14/2022  Impression:  Improved aeration throughout the lungs.    Echo  Result Date: 7/11/2022  · The left ventricle is normal in size with mild concentric hypertrophy and severely decreased systolic function.   · The estimated ejection fraction is 25%.   · There is severe left ventricular global hypokinesis.   · Grade II left ventricular diastolic dysfunction.   · Mild aortic regurgitation.   · Mild to moderate tricuspid regurgitation.   · There is moderate pulmonary hypertension.   · The estimated PA systolic pressure is 52 mmHg.   · Intermediate central venous pressure (8 mmHg).      Cardiac catheterization  Result Date: 7/14/2022  · Widely patent coronary anatomy with no evidence of obstructive CAD · Ventriculogram not performed. The end-diastolic pressure was normal at 5-10 mm Hg. · Angio-Seal successfully deployed in the right femoral artery following removal of the 5 [place] sheath.  The procedure log was documented by Documenter: Debora Zeng RN and verified by Mitul Mabry MD. Date: 7/14/2022  Time: 3:22 PM Procedure: Selective coronary angiography Left ventriculography Left heart catheterization Indication:  Consent: The patient was brought to the cardiac catheterization lab. Was instructed and explained about the risk, benefit and alternatives of the procedure included but not limited to sudden cardiac death, myocardial infarction, bleeding, vascular injury, renal failure, stroke, contrast allergy, risk of conscious sedation and need for  emergent bypass surgery.  the patient was agreeable to proceed.  Signed the consent form. time-out was completed verifying correct patient, procedure, site, positioning, and special equipment if applicable. Access:  The patient was prepped using the usual sterile fashion. Right common femoral artery.The right common femoral artery angiogram showed adequate entry of the femoral artery above the bifurcation below the inferior epigastric vessel.  was accessed with micropuncture technique, ultrasound guidance.  An image of the ultrasound was put in the paper chart for purpose of documentation. Sheath size:5F Jose test:  Verified with pulse oximetry deemed to be inadequate for radial artery procedure. R Femoral access obtained Diagnostic catheters :  5 [place] JL4 JR4 and pigtail catheter. Coronary findings: Dominance: right Left main:  Patent vessel that bifurcates into a LAD and circumflex system. Left anterior descending artery:  Widely patent type 2 vessel that gives rise to 2 medium-sized patent diagonal branches. Circumflex artery:  Small nondominant vessel . Right coronary artery:  Large dominant vessel with proximal 50% napkin ring stenosis. Ramus:  Very large patent trifurcating vessel. Left ventriculography: Ventriculogram not performed.  End-diastolic pressure 5-10 mm Hg Access Closure :  At the end of the procedure the sheath was removed. A Angio-Seal applied to the right common femoral artery. Excellent hemostasis achieved. Impression/plan: GDMT

## 2022-07-17 NOTE — PT/OT/SLP PROGRESS
Physical Therapy Treatment    Patient Name:  Porsha Horan   MRN:  [id number]    Recommendations:     Discharge Recommendations:  home, home health PT   Discharge Equipment Recommendations: none     Subjective     Patient awake and alert.     Communicated with NSG prior to session to see if Pt is appropriate for therapy today. Pt greeted and agreed to session.    Objective:     General Precautions: Standard, fall   Orthopedic Precautions:N/A   Braces:    Respiratory Status: Room air     Functional Mobility:    · Bed Mobility: Min Assist  · Sit to Stand: Min Assist  · Transfers: Min Assist  · Gait: 250 Ft. Min Assist   Equipment Used: Gait belt, Rolling walker    Assessment:     Porsha Horan is a 86 y.o. female admitted with a medical diagnosis of Flash pulmonary edema.     Treatment Encounter Note:  Patient actively participated with treatment today with no adverse effects. Patent performed bed mobility to EOB. Pt ambulated 250 ft. Min a with slow manuel and multiple standing rest breaks. Patient left up in chair with all lines intact and call button in reach.    Rehab Prognosis: Good; patient would benefit from acute skilled PT services to address these deficits and reach maximum level of function.    Recent Surgery: Procedure(s) (LRB):  Left heart cath (N/A) 3 Days Post-Op  GOALS:   Multidisciplinary Problems     Physical Therapy Goals        Problem: Physical Therapy    Goal Priority Disciplines Outcome Goal Variances Interventions   Physical Therapy Goal     PT, PT/OT Ongoing, Progressing     Description: Goals to be met by: 22     Patient will increase functional independence with mobility by performin. Sit to stand transfer with Modified [place]  2. Gait  x 150 feet with Modified [place] using Single-point Cane vs none.                      Plan:     During this hospitalization, patient to be seen 5 x/week to address the identified rehab impairments via gait training, therapeutic  activities, therapeutic exercises and progress toward the following goals:    · Plan of Care Expires:  08/13/22    Billable Minutes     Billable Minutes: Gait Training 23 and Therapeutic Activity 15       PT/PTA: PTA     PTA Visit Number: 3     07/17/2022

## 2022-07-19 ENCOUNTER — PATIENT OUTREACH (OUTPATIENT)
Dept: ADMINISTRATIVE | Facility: CLINIC | Age: 87
End: 2022-07-19
Payer: MEDICARE

## 2022-07-19 NOTE — PROGRESS NOTES
C3 nurse spoke with *Porsha Faye Marline and darrell Caldwell for a TCC post hospital discharge follow up call. The patient has a scheduled HOSFU appointment with *Jayy Allen MD on 07/22/2022 @ 09:10 am.      Please do not reply to this message, as this inbox is not routinely monitored.

## 2024-04-10 ENCOUNTER — HOSPITAL ENCOUNTER (OUTPATIENT)
Facility: HOSPITAL | Age: 89
Discharge: HOME | End: 2024-04-10
Attending: INTERNAL MEDICINE | Admitting: INTERNAL MEDICINE
Payer: MEDICARE

## 2024-04-10 DIAGNOSIS — R94.8 ABNORMAL PET SCAN, MEDIASTINUM: ICD-10-CM

## 2024-04-10 DIAGNOSIS — R94.39 ABNORMAL STRESS TEST: ICD-10-CM

## 2024-04-10 DIAGNOSIS — I25.10 CAD (CORONARY ARTERY DISEASE): ICD-10-CM

## 2024-04-10 DIAGNOSIS — R07.9 CHEST PAIN: ICD-10-CM

## 2024-04-10 LAB
OHS QRS DURATION: 96 MS
OHS QRS DURATION: 98 MS
OHS QTC CALCULATION: 497 MS
OHS QTC CALCULATION: 538 MS
POCT GLUCOSE: 114 MG/DL (ref 70–110)

## 2024-04-10 PROCEDURE — 93454 CORONARY ARTERY ANGIO S&I: CPT | Mod: 59 | Performed by: INTERNAL MEDICINE

## 2024-04-10 PROCEDURE — 93010 ELECTROCARDIOGRAM REPORT: CPT | Mod: ,,, | Performed by: INTERNAL MEDICINE

## 2024-04-10 PROCEDURE — 27201423 OPTIME MED/SURG SUP & DEVICES STERILE SUPPLY: Performed by: INTERNAL MEDICINE

## 2024-04-10 PROCEDURE — 25500020 PHARM REV CODE 255: Performed by: INTERNAL MEDICINE

## 2024-04-10 PROCEDURE — 25000003 PHARM REV CODE 250: Performed by: INTERNAL MEDICINE

## 2024-04-10 PROCEDURE — C1887 CATHETER, GUIDING: HCPCS | Performed by: INTERNAL MEDICINE

## 2024-04-10 PROCEDURE — 99152 MOD SED SAME PHYS/QHP 5/>YRS: CPT | Performed by: INTERNAL MEDICINE

## 2024-04-10 PROCEDURE — 92978 ENDOLUMINL IVUS OCT C 1ST: CPT | Mod: RC | Performed by: INTERNAL MEDICINE

## 2024-04-10 PROCEDURE — C1874 STENT, COATED/COV W/DEL SYS: HCPCS | Performed by: INTERNAL MEDICINE

## 2024-04-10 PROCEDURE — C1761 OPTIME CATH, TRANSLUMINAL INTRAVASC LITHO, CORONARY: HCPCS | Performed by: INTERNAL MEDICINE

## 2024-04-10 PROCEDURE — C1760 CLOSURE DEV, VASC: HCPCS | Performed by: INTERNAL MEDICINE

## 2024-04-10 PROCEDURE — 92972 PERQ TRLUML CORONRY LITHOTRP: CPT | Performed by: INTERNAL MEDICINE

## 2024-04-10 PROCEDURE — C1769 GUIDE WIRE: HCPCS | Performed by: INTERNAL MEDICINE

## 2024-04-10 PROCEDURE — 99153 MOD SED SAME PHYS/QHP EA: CPT | Performed by: INTERNAL MEDICINE

## 2024-04-10 PROCEDURE — C1894 INTRO/SHEATH, NON-LASER: HCPCS | Performed by: INTERNAL MEDICINE

## 2024-04-10 PROCEDURE — C9600 PERC DRUG-EL COR STENT SING: HCPCS | Mod: RC | Performed by: INTERNAL MEDICINE

## 2024-04-10 PROCEDURE — C1753 CATH, INTRAVAS ULTRASOUND: HCPCS | Performed by: INTERNAL MEDICINE

## 2024-04-10 PROCEDURE — 93005 ELECTROCARDIOGRAM TRACING: CPT

## 2024-04-10 PROCEDURE — 63600175 PHARM REV CODE 636 W HCPCS: Performed by: INTERNAL MEDICINE

## 2024-04-10 DEVICE — ANGIO-SEAL VIP VASCULAR CLOSURE DEVICE
Type: IMPLANTABLE DEVICE | Site: CORONARY | Status: FUNCTIONAL
Brand: ANGIO-SEAL

## 2024-04-10 DEVICE — EVEROLIMUS-ELUTING PLATINUM CHROMIUM CORONARY STENT SYSTEM
Type: IMPLANTABLE DEVICE | Site: CORONARY | Status: FUNCTIONAL
Brand: SYNERGY™ XD

## 2024-04-10 RX ORDER — SODIUM CHLORIDE 9 MG/ML
INJECTION, SOLUTION INTRAVENOUS CONTINUOUS
Status: DISCONTINUED | OUTPATIENT
Start: 2024-04-10 | End: 2024-04-10 | Stop reason: HOSPADM

## 2024-04-10 RX ORDER — HEPARIN SODIUM 1000 [USP'U]/ML
INJECTION, SOLUTION INTRAVENOUS; SUBCUTANEOUS
Status: DISCONTINUED | OUTPATIENT
Start: 2024-04-10 | End: 2024-04-10 | Stop reason: HOSPADM

## 2024-04-10 RX ORDER — ALUMINUM HYDROXIDE, MAGNESIUM HYDROXIDE, AND SIMETHICONE 1200; 120; 1200 MG/30ML; MG/30ML; MG/30ML
SUSPENSION ORAL
Status: DISCONTINUED | OUTPATIENT
Start: 2024-04-10 | End: 2024-04-10 | Stop reason: HOSPADM

## 2024-04-10 RX ORDER — HYDRALAZINE HYDROCHLORIDE 20 MG/ML
10 INJECTION INTRAMUSCULAR; INTRAVENOUS EVERY 4 HOURS PRN
Status: DISCONTINUED | OUTPATIENT
Start: 2024-04-10 | End: 2024-04-10 | Stop reason: HOSPADM

## 2024-04-10 RX ORDER — ACETAMINOPHEN 325 MG/1
650 TABLET ORAL EVERY 4 HOURS PRN
Status: DISCONTINUED | OUTPATIENT
Start: 2024-04-10 | End: 2024-04-10 | Stop reason: HOSPADM

## 2024-04-10 RX ORDER — SODIUM CHLORIDE 0.9 % (FLUSH) 0.9 %
10 SYRINGE (ML) INJECTION
Status: DISCONTINUED | OUTPATIENT
Start: 2024-04-10 | End: 2024-04-10 | Stop reason: HOSPADM

## 2024-04-10 RX ORDER — DIPHENHYDRAMINE HCL 25 MG
50 CAPSULE ORAL
Status: DISCONTINUED | OUTPATIENT
Start: 2024-04-10 | End: 2024-04-10 | Stop reason: HOSPADM

## 2024-04-10 RX ORDER — DIAZEPAM 5 MG/1
10 TABLET ORAL ONCE
Status: DISCONTINUED | OUTPATIENT
Start: 2024-04-10 | End: 2024-04-10 | Stop reason: HOSPADM

## 2024-04-10 RX ORDER — CLOPIDOGREL BISULFATE 300 MG/1
TABLET, FILM COATED ORAL
Status: DISCONTINUED | OUTPATIENT
Start: 2024-04-10 | End: 2024-04-10 | Stop reason: HOSPADM

## 2024-04-10 RX ORDER — HYDRALAZINE HYDROCHLORIDE 20 MG/ML
INJECTION INTRAMUSCULAR; INTRAVENOUS
Status: DISCONTINUED | OUTPATIENT
Start: 2024-04-10 | End: 2024-04-10 | Stop reason: HOSPADM

## 2024-04-10 RX ORDER — FENTANYL CITRATE 50 UG/ML
INJECTION, SOLUTION INTRAMUSCULAR; INTRAVENOUS
Status: DISCONTINUED | OUTPATIENT
Start: 2024-04-10 | End: 2024-04-10 | Stop reason: HOSPADM

## 2024-04-10 RX ORDER — LIDOCAINE HYDROCHLORIDE 20 MG/ML
INJECTION, SOLUTION INFILTRATION; PERINEURAL
Status: DISCONTINUED | OUTPATIENT
Start: 2024-04-10 | End: 2024-04-10 | Stop reason: HOSPADM

## 2024-04-10 RX ORDER — SODIUM CHLORIDE 9 MG/ML
INJECTION, SOLUTION INTRAVENOUS ONCE
Status: DISCONTINUED | OUTPATIENT
Start: 2024-04-10 | End: 2024-04-10 | Stop reason: HOSPADM

## 2024-04-10 RX ORDER — MIDAZOLAM HYDROCHLORIDE 1 MG/ML
INJECTION INTRAMUSCULAR; INTRAVENOUS
Status: DISCONTINUED | OUTPATIENT
Start: 2024-04-10 | End: 2024-04-10 | Stop reason: HOSPADM

## 2024-04-10 RX ORDER — HYDROCODONE BITARTRATE AND ACETAMINOPHEN 5; 325 MG/1; MG/1
1 TABLET ORAL EVERY 4 HOURS PRN
Status: DISCONTINUED | OUTPATIENT
Start: 2024-04-10 | End: 2024-04-10 | Stop reason: HOSPADM

## 2024-04-10 RX ORDER — VERAPAMIL HYDROCHLORIDE 2.5 MG/ML
INJECTION, SOLUTION INTRAVENOUS
Status: DISCONTINUED | OUTPATIENT
Start: 2024-04-10 | End: 2024-04-10 | Stop reason: HOSPADM

## 2024-04-10 RX ORDER — NITROGLYCERIN 20 MG/100ML
INJECTION INTRAVENOUS
Status: DISCONTINUED | OUTPATIENT
Start: 2024-04-10 | End: 2024-04-10 | Stop reason: HOSPADM

## 2024-04-10 RX ORDER — ONDANSETRON 4 MG/1
8 TABLET, ORALLY DISINTEGRATING ORAL EVERY 8 HOURS PRN
Status: DISCONTINUED | OUTPATIENT
Start: 2024-04-10 | End: 2024-04-10 | Stop reason: HOSPADM

## 2024-04-10 RX ORDER — MORPHINE SULFATE 4 MG/ML
2 INJECTION, SOLUTION INTRAMUSCULAR; INTRAVENOUS EVERY 4 HOURS PRN
Status: DISCONTINUED | OUTPATIENT
Start: 2024-04-10 | End: 2024-04-10 | Stop reason: HOSPADM

## 2024-04-10 RX ADMIN — SODIUM CHLORIDE: 9 INJECTION, SOLUTION INTRAVENOUS at 03:04

## 2024-04-10 NOTE — Clinical Note
The catheter was inserted into the and was inserted over the wire into the. Hemodynamics were performed.

## 2024-04-10 NOTE — DISCHARGE SUMMARY
[facility] - Cath Lab Services  Discharge Note  Short Stay    Procedure(s) (LRB):  Left heart cath (Left)      OUTCOME: Patient tolerated treatment/procedure well without complication and is now ready for discharge.    DISPOSITION: Home or Self Care    FINAL DIAGNOSIS:  CAD  FOLLOWUP: In clinic    DISCHARGE INSTRUCTIONS:  No discharge procedures on file.     TIME SPENT ON DISCHARGE: 60 minutes

## 2024-04-10 NOTE — Clinical Note
The catheter was inserted into the, was removed from the and was inserted over the wire into the right coronary artery. IVUS performed

## 2024-04-10 NOTE — Clinical Note
The catheter was inserted into the, was removed from the and was inserted over the wire into the ostium   right coronary artery. Hemodynamics were performed.  An angiography was performed of the right coronary arteries. Multiple views were taken. The angiography was performed via power injection.  RAMBO

## 2024-04-11 NOTE — PLAN OF CARE
Pt in no acute acute distress - groin site intact - IV removed.  Hemostasis achieved; life vest on; rolled out to daughter in car.

## 2024-04-16 VITALS
HEIGHT: 66 IN | RESPIRATION RATE: 15 BRPM | HEART RATE: 68 BPM | OXYGEN SATURATION: 93 % | SYSTOLIC BLOOD PRESSURE: 158 MMHG | TEMPERATURE: 98 F | DIASTOLIC BLOOD PRESSURE: 75 MMHG | BODY MASS INDEX: 34.65 KG/M2 | WEIGHT: 215.63 LBS

## 2024-12-02 ENCOUNTER — HOSPITAL ENCOUNTER (INPATIENT)
Facility: HOSPITAL | Age: 89
LOS: 2 days | Discharge: HOME HEALTH | DRG: 291 | End: 2024-12-04
Attending: STUDENT IN AN ORGANIZED HEALTH CARE EDUCATION/TRAINING PROGRAM | Admitting: INTERNAL MEDICINE
Payer: MEDICARE

## 2024-12-02 DIAGNOSIS — I50.40 HEART FAILURE WITH REDUCED EJECTION FRACTION AND DIASTOLIC DYSFUNCTION: ICD-10-CM

## 2024-12-02 DIAGNOSIS — J81.0 ACUTE PULMONARY EDEMA: Primary | ICD-10-CM

## 2024-12-02 DIAGNOSIS — R06.02 SHORTNESS OF BREATH: ICD-10-CM

## 2024-12-02 DIAGNOSIS — I50.9 CHF EXACERBATION: ICD-10-CM

## 2024-12-02 DIAGNOSIS — R07.9 CHEST PAIN: ICD-10-CM

## 2024-12-02 LAB
ALBUMIN SERPL-MCNC: 3.6 G/DL (ref 3.4–4.8)
ALBUMIN/GLOB SERPL: 0.9 RATIO (ref 1.1–2)
ALLENS TEST BLOOD GAS (OHS): YES
ALP SERPL-CCNC: 86 UNIT/L (ref 40–150)
ALT SERPL-CCNC: 24 UNIT/L (ref 0–55)
ANION GAP SERPL CALC-SCNC: 10 MEQ/L
ANION GAP SERPL CALC-SCNC: 14 MEQ/L
AST SERPL-CCNC: 43 UNIT/L (ref 5–34)
BASE EXCESS BLD CALC-SCNC: 0.1 MMOL/L (ref -2–2)
BASOPHILS # BLD AUTO: 0.06 X10(3)/MCL
BASOPHILS NFR BLD AUTO: 0.5 %
BILIRUB SERPL-MCNC: 1 MG/DL
BIPAP(E) BLOOD GAS (OHS): 6 CM H2O
BIPAP(I) BLOOD GAS (OHS): 12 CM H2O
BLOOD GAS SAMPLE TYPE (OHS): ABNORMAL
BNP BLD-MCNC: 1123.3 PG/ML
BUN SERPL-MCNC: 21.2 MG/DL (ref 9.8–20.1)
BUN SERPL-MCNC: 23.2 MG/DL (ref 9.8–20.1)
CA-I BLD-SCNC: 1.08 MMOL/L (ref 1.12–1.23)
CALCIUM SERPL-MCNC: 8.5 MG/DL (ref 8.4–10.2)
CALCIUM SERPL-MCNC: 8.6 MG/DL (ref 8.4–10.2)
CHLORIDE SERPL-SCNC: 104 MMOL/L (ref 98–107)
CHLORIDE SERPL-SCNC: 104 MMOL/L (ref 98–107)
CO2 BLDA-SCNC: 27.4 MMOL/L
CO2 SERPL-SCNC: 23 MMOL/L (ref 23–31)
CO2 SERPL-SCNC: 29 MMOL/L (ref 23–31)
COHGB MFR BLDA: 2.7 % (ref 0.5–1.5)
CREAT SERPL-MCNC: 1.42 MG/DL (ref 0.55–1.02)
CREAT SERPL-MCNC: 1.68 MG/DL (ref 0.55–1.02)
CREAT/UREA NIT SERPL: 13
CREAT/UREA NIT SERPL: 16
DRAWN BY BLOOD GAS (OHS): ABNORMAL
EOSINOPHIL # BLD AUTO: 0.05 X10(3)/MCL (ref 0–0.9)
EOSINOPHIL NFR BLD AUTO: 0.4 %
ERYTHROCYTE [DISTWIDTH] IN BLOOD BY AUTOMATED COUNT: 17.2 % (ref 11.5–17)
EST. AVERAGE GLUCOSE BLD GHB EST-MCNC: 114 MG/DL
FLUAV AG UPPER RESP QL IA.RAPID: NOT DETECTED
FLUBV AG UPPER RESP QL IA.RAPID: NOT DETECTED
GFR SERPLBLD CREATININE-BSD FMLA CKD-EPI: 29 ML/MIN/1.73/M2
GFR SERPLBLD CREATININE-BSD FMLA CKD-EPI: 36 ML/MIN/1.73/M2
GLOBULIN SER-MCNC: 4 GM/DL (ref 2.4–3.5)
GLUCOSE SERPL-MCNC: 183 MG/DL (ref 82–115)
GLUCOSE SERPL-MCNC: 314 MG/DL (ref 82–115)
HBA1C MFR BLD: 5.6 %
HCO3 BLDA-SCNC: 26 MMOL/L (ref 22–26)
HCT VFR BLD AUTO: 42 % (ref 37–47)
HGB BLD-MCNC: 12.8 G/DL (ref 12–16)
IMM GRANULOCYTES # BLD AUTO: 0.05 X10(3)/MCL (ref 0–0.04)
IMM GRANULOCYTES NFR BLD AUTO: 0.4 %
INHALED O2 CONCENTRATION: 40 %
INR PPP: 1.1
LYMPHOCYTES # BLD AUTO: 0.87 X10(3)/MCL (ref 0.6–4.6)
LYMPHOCYTES NFR BLD AUTO: 7.6 %
MAGNESIUM SERPL-MCNC: 1.7 MG/DL (ref 1.6–2.6)
MCH RBC QN AUTO: 26.8 PG (ref 27–31)
MCHC RBC AUTO-ENTMCNC: 30.5 G/DL (ref 33–36)
MCV RBC AUTO: 87.9 FL (ref 80–94)
MECH RR (OHS): 14 B/MIN
METHGB MFR BLDA: 1.1 % (ref 0.4–1.5)
MODE (OHS): ABNORMAL
MONOCYTES # BLD AUTO: 0.45 X10(3)/MCL (ref 0.1–1.3)
MONOCYTES NFR BLD AUTO: 3.9 %
NEUTROPHILS # BLD AUTO: 9.97 X10(3)/MCL (ref 2.1–9.2)
NEUTROPHILS NFR BLD AUTO: 87.2 %
NRBC BLD AUTO-RTO: 0 %
O2 HB BLOOD GAS (OHS): 94.5 % (ref 94–97)
OHS QRS DURATION: 92 MS
OHS QTC CALCULATION: 430 MS
OXYHGB MFR BLDA: 13.2 G/DL (ref 12–16)
PCO2 BLDA: 46 MMHG (ref 35–45)
PH BLDA: 7.36 [PH] (ref 7.35–7.45)
PLATELET # BLD AUTO: 264 X10(3)/MCL (ref 130–400)
PMV BLD AUTO: 11.2 FL (ref 7.4–10.4)
PO2 BLDA: 85 MMHG (ref 80–100)
POTASSIUM BLOOD GAS (OHS): 4.2 MMOL/L (ref 3.5–5)
POTASSIUM SERPL-SCNC: 3.9 MMOL/L (ref 3.5–5.1)
POTASSIUM SERPL-SCNC: 5.5 MMOL/L (ref 3.5–5.1)
PROT SERPL-MCNC: 7.6 GM/DL (ref 5.8–7.6)
PROTHROMBIN TIME: 13.5 SECONDS (ref 12.5–14.5)
RBC # BLD AUTO: 4.78 X10(6)/MCL (ref 4.2–5.4)
RSV A 5' UTR RNA NPH QL NAA+PROBE: NOT DETECTED
SAMPLE SITE BLOOD GAS (OHS): ABNORMAL
SAO2 % BLDA: 96 %
SARS-COV-2 RNA RESP QL NAA+PROBE: NOT DETECTED
SODIUM BLOOD GAS (OHS): 133 MMOL/L (ref 137–145)
SODIUM SERPL-SCNC: 141 MMOL/L (ref 136–145)
SODIUM SERPL-SCNC: 143 MMOL/L (ref 136–145)
TROPONIN I SERPL-MCNC: <0.01 NG/ML (ref 0–0.04)
WBC # BLD AUTO: 11.45 X10(3)/MCL (ref 4.5–11.5)

## 2024-12-02 PROCEDURE — 84484 ASSAY OF TROPONIN QUANT: CPT | Performed by: STUDENT IN AN ORGANIZED HEALTH CARE EDUCATION/TRAINING PROGRAM

## 2024-12-02 PROCEDURE — 84484 ASSAY OF TROPONIN QUANT: CPT

## 2024-12-02 PROCEDURE — 25000003 PHARM REV CODE 250

## 2024-12-02 PROCEDURE — 94660 CPAP INITIATION&MGMT: CPT

## 2024-12-02 PROCEDURE — 25000003 PHARM REV CODE 250: Performed by: STUDENT IN AN ORGANIZED HEALTH CARE EDUCATION/TRAINING PROGRAM

## 2024-12-02 PROCEDURE — 5A09457 ASSISTANCE WITH RESPIRATORY VENTILATION, 24-96 CONSECUTIVE HOURS, CONTINUOUS POSITIVE AIRWAY PRESSURE: ICD-10-PCS | Performed by: STUDENT IN AN ORGANIZED HEALTH CARE EDUCATION/TRAINING PROGRAM

## 2024-12-02 PROCEDURE — 83036 HEMOGLOBIN GLYCOSYLATED A1C: CPT

## 2024-12-02 PROCEDURE — 25000003 PHARM REV CODE 250: Performed by: NURSE PRACTITIONER

## 2024-12-02 PROCEDURE — 63600175 PHARM REV CODE 636 W HCPCS

## 2024-12-02 PROCEDURE — 99291 CRITICAL CARE FIRST HOUR: CPT

## 2024-12-02 PROCEDURE — 83735 ASSAY OF MAGNESIUM: CPT | Performed by: STUDENT IN AN ORGANIZED HEALTH CARE EDUCATION/TRAINING PROGRAM

## 2024-12-02 PROCEDURE — 96374 THER/PROPH/DIAG INJ IV PUSH: CPT

## 2024-12-02 PROCEDURE — 83880 ASSAY OF NATRIURETIC PEPTIDE: CPT | Performed by: STUDENT IN AN ORGANIZED HEALTH CARE EDUCATION/TRAINING PROGRAM

## 2024-12-02 PROCEDURE — 93005 ELECTROCARDIOGRAM TRACING: CPT

## 2024-12-02 PROCEDURE — 99900031 HC PATIENT EDUCATION (STAT)

## 2024-12-02 PROCEDURE — 0241U COVID/RSV/FLU A&B PCR: CPT | Performed by: STUDENT IN AN ORGANIZED HEALTH CARE EDUCATION/TRAINING PROGRAM

## 2024-12-02 PROCEDURE — 85025 COMPLETE CBC W/AUTO DIFF WBC: CPT | Performed by: STUDENT IN AN ORGANIZED HEALTH CARE EDUCATION/TRAINING PROGRAM

## 2024-12-02 PROCEDURE — 63600175 PHARM REV CODE 636 W HCPCS: Performed by: STUDENT IN AN ORGANIZED HEALTH CARE EDUCATION/TRAINING PROGRAM

## 2024-12-02 PROCEDURE — 94760 N-INVAS EAR/PLS OXIMETRY 1: CPT | Mod: XB

## 2024-12-02 PROCEDURE — 93010 ELECTROCARDIOGRAM REPORT: CPT | Mod: ,,, | Performed by: STUDENT IN AN ORGANIZED HEALTH CARE EDUCATION/TRAINING PROGRAM

## 2024-12-02 PROCEDURE — 99900035 HC TECH TIME PER 15 MIN (STAT)

## 2024-12-02 PROCEDURE — 36415 COLL VENOUS BLD VENIPUNCTURE: CPT | Performed by: INTERNAL MEDICINE

## 2024-12-02 PROCEDURE — 25000003 PHARM REV CODE 250: Performed by: INTERNAL MEDICINE

## 2024-12-02 PROCEDURE — 97162 PT EVAL MOD COMPLEX 30 MIN: CPT

## 2024-12-02 PROCEDURE — 27100171 HC OXYGEN HIGH FLOW UP TO 24 HOURS

## 2024-12-02 PROCEDURE — 36600 WITHDRAWAL OF ARTERIAL BLOOD: CPT

## 2024-12-02 PROCEDURE — 94799 UNLISTED PULMONARY SVC/PX: CPT

## 2024-12-02 PROCEDURE — 82803 BLOOD GASES ANY COMBINATION: CPT

## 2024-12-02 PROCEDURE — 27000190 HC CPAP FULL FACE MASK W/VALVE

## 2024-12-02 PROCEDURE — 21400001 HC TELEMETRY ROOM

## 2024-12-02 PROCEDURE — 80053 COMPREHEN METABOLIC PANEL: CPT | Performed by: STUDENT IN AN ORGANIZED HEALTH CARE EDUCATION/TRAINING PROGRAM

## 2024-12-02 PROCEDURE — 85610 PROTHROMBIN TIME: CPT | Performed by: STUDENT IN AN ORGANIZED HEALTH CARE EDUCATION/TRAINING PROGRAM

## 2024-12-02 RX ORDER — GABAPENTIN 300 MG/1
600 CAPSULE ORAL 3 TIMES DAILY
Status: DISCONTINUED | OUTPATIENT
Start: 2024-12-02 | End: 2024-12-04 | Stop reason: HOSPADM

## 2024-12-02 RX ORDER — ATORVASTATIN CALCIUM 40 MG/1
40 TABLET, FILM COATED ORAL EVERY EVENING
Status: DISCONTINUED | OUTPATIENT
Start: 2024-12-02 | End: 2024-12-04 | Stop reason: HOSPADM

## 2024-12-02 RX ORDER — CLOPIDOGREL BISULFATE 75 MG/1
75 TABLET ORAL DAILY
Status: DISCONTINUED | OUTPATIENT
Start: 2024-12-02 | End: 2024-12-04 | Stop reason: HOSPADM

## 2024-12-02 RX ORDER — IBUPROFEN 200 MG
24 TABLET ORAL
Status: DISCONTINUED | OUTPATIENT
Start: 2024-12-02 | End: 2024-12-04 | Stop reason: HOSPADM

## 2024-12-02 RX ORDER — GLUCAGON 1 MG
1 KIT INJECTION
Status: DISCONTINUED | OUTPATIENT
Start: 2024-12-02 | End: 2024-12-04 | Stop reason: HOSPADM

## 2024-12-02 RX ORDER — FUROSEMIDE 10 MG/ML
40 INJECTION INTRAMUSCULAR; INTRAVENOUS EVERY 12 HOURS
Status: DISCONTINUED | OUTPATIENT
Start: 2024-12-02 | End: 2024-12-04 | Stop reason: HOSPADM

## 2024-12-02 RX ORDER — PROMETHAZINE HYDROCHLORIDE 25 MG/1
25 TABLET ORAL EVERY 6 HOURS PRN
COMMUNITY

## 2024-12-02 RX ORDER — IBUPROFEN 200 MG
16 TABLET ORAL
Status: DISCONTINUED | OUTPATIENT
Start: 2024-12-02 | End: 2024-12-04 | Stop reason: HOSPADM

## 2024-12-02 RX ORDER — ONDANSETRON HYDROCHLORIDE 2 MG/ML
4 INJECTION, SOLUTION INTRAVENOUS EVERY 8 HOURS PRN
Status: DISCONTINUED | OUTPATIENT
Start: 2024-12-02 | End: 2024-12-04 | Stop reason: HOSPADM

## 2024-12-02 RX ORDER — NIFEDIPINE 30 MG/1
30 TABLET, EXTENDED RELEASE ORAL DAILY
Status: DISCONTINUED | OUTPATIENT
Start: 2024-12-02 | End: 2024-12-04 | Stop reason: HOSPADM

## 2024-12-02 RX ORDER — ACETAMINOPHEN 325 MG/1
650 TABLET ORAL EVERY 8 HOURS PRN
Status: DISCONTINUED | OUTPATIENT
Start: 2024-12-02 | End: 2024-12-04 | Stop reason: HOSPADM

## 2024-12-02 RX ORDER — CARVEDILOL 12.5 MG/1
12.5 TABLET ORAL 2 TIMES DAILY WITH MEALS
COMMUNITY

## 2024-12-02 RX ORDER — FUROSEMIDE 10 MG/ML
60 INJECTION INTRAMUSCULAR; INTRAVENOUS
Status: COMPLETED | OUTPATIENT
Start: 2024-12-02 | End: 2024-12-02

## 2024-12-02 RX ORDER — INSULIN ASPART 100 [IU]/ML
0-10 INJECTION, SOLUTION INTRAVENOUS; SUBCUTANEOUS EVERY 6 HOURS PRN
Status: DISCONTINUED | OUTPATIENT
Start: 2024-12-02 | End: 2024-12-04 | Stop reason: HOSPADM

## 2024-12-02 RX ORDER — TALC
6 POWDER (GRAM) TOPICAL NIGHTLY PRN
Status: DISCONTINUED | OUTPATIENT
Start: 2024-12-02 | End: 2024-12-04 | Stop reason: HOSPADM

## 2024-12-02 RX ORDER — HYDRALAZINE HYDROCHLORIDE 20 MG/ML
10 INJECTION INTRAMUSCULAR; INTRAVENOUS EVERY 4 HOURS PRN
Status: DISCONTINUED | OUTPATIENT
Start: 2024-12-02 | End: 2024-12-04 | Stop reason: HOSPADM

## 2024-12-02 RX ORDER — CARVEDILOL 12.5 MG/1
12.5 TABLET ORAL 2 TIMES DAILY WITH MEALS
Status: DISCONTINUED | OUTPATIENT
Start: 2024-12-02 | End: 2024-12-04 | Stop reason: HOSPADM

## 2024-12-02 RX ORDER — ASPIRIN 81 MG/1
81 TABLET ORAL DAILY
Status: DISCONTINUED | OUTPATIENT
Start: 2024-12-02 | End: 2024-12-04 | Stop reason: HOSPADM

## 2024-12-02 RX ORDER — NALOXONE HCL 0.4 MG/ML
0.02 VIAL (ML) INJECTION
Status: DISCONTINUED | OUTPATIENT
Start: 2024-12-02 | End: 2024-12-04 | Stop reason: HOSPADM

## 2024-12-02 RX ORDER — SODIUM CHLORIDE 0.9 % (FLUSH) 0.9 %
10 SYRINGE (ML) INJECTION EVERY 12 HOURS PRN
Status: DISCONTINUED | OUTPATIENT
Start: 2024-12-02 | End: 2024-12-04 | Stop reason: HOSPADM

## 2024-12-02 RX ORDER — PANTOPRAZOLE SODIUM 40 MG/1
40 TABLET, DELAYED RELEASE ORAL DAILY
Status: DISCONTINUED | OUTPATIENT
Start: 2024-12-02 | End: 2024-12-04 | Stop reason: HOSPADM

## 2024-12-02 RX ORDER — SACUBITRIL AND VALSARTAN 49; 51 MG/1; MG/1
1 TABLET, FILM COATED ORAL 2 TIMES DAILY
Status: DISCONTINUED | OUTPATIENT
Start: 2024-12-02 | End: 2024-12-02

## 2024-12-02 RX ORDER — ACETAMINOPHEN 325 MG/1
650 TABLET ORAL EVERY 4 HOURS PRN
Status: DISCONTINUED | OUTPATIENT
Start: 2024-12-02 | End: 2024-12-04 | Stop reason: HOSPADM

## 2024-12-02 RX ADMIN — NITROGLYCERIN 1 INCH: 20 OINTMENT TOPICAL at 05:12

## 2024-12-02 RX ADMIN — FUROSEMIDE 40 MG: 10 INJECTION, SOLUTION INTRAMUSCULAR; INTRAVENOUS at 09:12

## 2024-12-02 RX ADMIN — FUROSEMIDE 60 MG: 10 INJECTION, SOLUTION INTRAMUSCULAR; INTRAVENOUS at 05:12

## 2024-12-02 RX ADMIN — ATORVASTATIN CALCIUM 40 MG: 40 TABLET, FILM COATED ORAL at 09:12

## 2024-12-02 RX ADMIN — CARVEDILOL 12.5 MG: 12.5 TABLET, FILM COATED ORAL at 04:12

## 2024-12-02 RX ADMIN — CARVEDILOL 12.5 MG: 12.5 TABLET, FILM COATED ORAL at 09:12

## 2024-12-02 RX ADMIN — CLOPIDOGREL BISULFATE 75 MG: 75 TABLET ORAL at 09:12

## 2024-12-02 RX ADMIN — NIFEDIPINE 30 MG: 30 TABLET, FILM COATED, EXTENDED RELEASE ORAL at 02:12

## 2024-12-02 RX ADMIN — FUROSEMIDE 40 MG: 10 INJECTION, SOLUTION INTRAMUSCULAR; INTRAVENOUS at 04:12

## 2024-12-02 RX ADMIN — SODIUM ZIRCONIUM CYCLOSILICATE 5 G: 5 POWDER, FOR SUSPENSION ORAL at 02:12

## 2024-12-02 RX ADMIN — ASPIRIN 81 MG: 81 TABLET, COATED ORAL at 09:12

## 2024-12-02 RX ADMIN — INSULIN ASPART 2 UNITS: 100 INJECTION, SOLUTION INTRAVENOUS; SUBCUTANEOUS at 12:12

## 2024-12-02 RX ADMIN — GABAPENTIN 600 MG: 300 CAPSULE ORAL at 09:12

## 2024-12-02 RX ADMIN — PANTOPRAZOLE SODIUM 40 MG: 40 TABLET, DELAYED RELEASE ORAL at 09:12

## 2024-12-02 RX ADMIN — SACUBITRIL AND VALSARTAN 1 TABLET: 49; 51 TABLET, FILM COATED ORAL at 09:12

## 2024-12-02 RX ADMIN — GABAPENTIN 600 MG: 300 CAPSULE ORAL at 02:12

## 2024-12-02 NOTE — H&P
[facility] Medicine History & Physical Examination       Patient Name: Porsha Horan  MRN: [id number]  Patient Class: IP- Inpatient   Admission Date: 12/2/2024   Admitting Physician: ERICA Service   Length of Stay: 0  Attending Physician: LA Muniz  Primary Care Provider: Jayy Allen MD  Face-to-Face encounter date: 12/02/2024  Code Status:Full Code   Chief Complaint: Shortness of Breath (Arrives [facility] c/o SOB. Initial O2 90% on RA, EMS placed pt on CPAP en route, not on pt on arrival, pt unable to tolerate per EMS. HTN, tachycardic, tachypneic. )      Screening for Social Drivers for health:  Patient screened for food insecurity, housing instability, transportation needs, utility difficulties, and interpersonal safety (select all that apply as identified as concern)  []Housing or Food  []Transportation Needs  []Utility Difficulties  []Interpersonal safety  [x]None      Patient information was obtained from patient, patient's family, past medical records and ER records.  ED records were reviewed in detail and documented below    HISTORY OF PRESENT ILLNESS:   Porsha Horan is a 89 y.o. female who Has a past medical history of HTN, HLD, JAYLA, T2DM, CKD 2, BPAD, and combined CHF EF 20-25% known to [facility]/Dr. Macedo who presented to [facility] on 12/2/2024 with a primary complaint of sudden onset of shortness of breath last night while sleeping.  She states she is compliant with her cardiac medications, but not the cardiac diet and admits to eating salty foods over the Thanksgiving holiday, especially her fried bologna sandwich last night.  She states she has had this happen in the past before and it was from fluid in her lungs.   She explained she is feeling much better since receiving IV Lasix and supplemental oxygen in therapies.  Initially, she presented to the ED hypoxic SpO2 87% on 4 L NC and was placed on BiPAP with significant improvement to oxygenation status  SpO2 96-98% and BPwas markedly elevated at 239/153 and has improved to 173/82.  She admits her shortness a breath his improving and she does have some trace BLE pretibial edema.  She denies any chest pain, palpitations, or fever.  She admits to nonproductive cough and denies any sick contacts.  She denies any chest pain, abdominal pain, nausea, vomiting, diarrhea, dysuria, headaches, visual disturbances, unilateral weakness, syncope, falls, any injuries.    Chart review:  Vital signs revealed the patient is afebrile, /82, HR 70, RR 19, SpO2 96% on 2 L NC at the time of my evaluation on the medical floor.  Labs unremarkable CBC, unremarkable PT/INR, chemistry with elevated creatinine of 1.68 and BUN of 21, elevated potassium of 5.5, markedly elevated BNP of 1190 23, normal troponin, hemoglobin A1c was 5.6, negative viral swabs and unremarkable ABG.  CXR showed increased cardiac silhouette with increased interstitial markings bilaterally concerning for pulmonary edema.  EKG shows sinus tachycardia 129 beats per minute with no STEMI criteria.  Patient was treated in the ED with IV Lasix and nitroglycerin paste.      ED consulted [facility]     Patient admitted for acute on chronic CHF exacerbation    PAST MEDICAL HISTORY:     Past Medical History:   Diagnosis Date    Atherosclerosis of both carotid arteries     CKD (chronic kidney disease) stage 2, GFR 60-89 ml/min     Diabetes mellitus     Hyperlipidemia     Hypertension     PAD (peripheral artery disease)        PAST SURGICAL HISTORY:     Past Surgical History:   Procedure Laterality Date    ANGIOPLASTY      CARDIAC CATHETERIZATION      HYSTERECTOMY      LEFT HEART CATHETERIZATION N/A 07/14/2022    Procedure: Left heart cath;  Surgeon: Mitul Mabry MD;  Location: [facility] CATH LAB;  Service: Cardiology;  Laterality: N/A;    LEFT HEART CATHETERIZATION Left 4/10/2024    Procedure: Left heart cath;  Surgeon: Basil Davies MD;  Location: [facility] CATH LAB;  Service:  Cardiology;  Laterality: Left;  LHC +/- PCI       ALLERGIES:   Tirofiban    FAMILY HISTORY:   Father: Hypertension and unspecified cancer   Mother:  Hypertension and unspecified cancer    SOCIAL HISTORY:     Social History     Tobacco Use    Smoking status: Former     Types: Cigarettes    Smokeless tobacco: Never   Substance Use Topics    Alcohol use: Not on file        HOME MEDICATIONS:     Prior to Admission medications    Medication Sig Start Date End Date Taking? Authorizing Provider   aspirin (ECOTRIN) 81 MG EC tablet Take 81 mg by mouth once daily.   Yes Provider, Historical   atorvastatin (LIPITOR) 40 MG tablet SMARTSI Tablet(s) By Mouth Every Evening 3/9/22  Yes Provider, Historical   carvediloL (COREG) 12.5 MG tablet Take 12.5 mg by mouth 2 (two) times daily with meals.   Yes Provider, Historical   clopidogreL (PLAVIX) 75 mg tablet Take 75 mg by mouth once daily. 3/9/22  Yes Provider, Historical   furosemide (LASIX) 20 MG tablet Take 1 tablet (20 mg total) by mouth once daily. 22  Yes Tyler Friend MD   gabapentin (NEURONTIN) 600 MG tablet Take 600 mg by mouth 3 (three) times daily. 3/9/22  Yes Provider, Historical   pantoprazole (PROTONIX) 40 MG tablet Take 40 mg by mouth once daily. 22  Yes Provider, Historical   sacubitriL-valsartan (ENTRESTO) 49-51 mg per tablet Take 1 tablet by mouth 2 (two) times daily. 22  Yes Tyler Friend MD   traMADoL (ULTRAM) 50 mg tablet Take 50 mg by mouth every 6 (six) hours as needed for Pain. 6/3/22  Yes Provider, Historical   nitroGLYCERIN (NITROSTAT) 0.4 MG SL tablet Place 1 tablet (0.4 mg total) under the tongue every 5 (five) minutes as needed for Chest pain.  Patient not taking: Reported on 2024  Tyler Friend MD   promethazine (PHENERGAN) 25 MG tablet Take 25 mg by mouth every 6 (six) hours as needed for Nausea.    Provider, Historical   hydroCHLOROthiazide (HYDRODIURIL) 25 MG tablet Take 25 mg by mouth every morning. 3/9/22  7/15/22  Provider, Historical       REVIEW OF SYSTEMS:   Except as documented, all other systems reviewed and negative     PHYSICAL EXAM:     VITAL SIGNS: 24 HRS MIN & MAX LAST   Temp  Min: 98.4 °F (36.9 °C)  Max: 98.4 °F (36.9 °C) 98.4 °F (36.9 °C)   BP  Min: 170/129  Max: 239/153 (!) 196/89   Pulse  Min: 70  Max: 127  82   Resp  Min: 14  Max: 42 19   SpO2  Min: 87 %  Max: 99 % 96 %     General appearance: Well-developed, well-nourished  female sitting up in bed in no apparent distress.  HENT: Atraumatic head. Moist mucous membranes of oral cavity.  Eyes: Normal extraocular movements.   Neck: Supple.   Lungs:  Bibasilar rales, no wheezing, no rhonchi, no tachypnea or respiratory distress.  Normal inspiratory and expiratory effort.  SpO2 96% on 2 L NC.  Heart: Regular rate and rhythm. S1 and S2 present.  No murmur.  1+ BLE pretibial edema.  Abdomen: Soft, non-distended, non-tender. No rebound tenderness/guarding. Bowel sounds are normal.   Extremities: No cyanosis, clubbing, or edema.  Skin: No Rash.   Neuro: Motor and sensory exams grossly intact. Good tone. Muscle strength 5/5 in all 4 extremities  Psych/mental status: Appropriate mood and affect. Responds appropriately to questions.     LABS AND IMAGING:     Recent Labs   Lab 12/02/24  0525   WBC 11.45   RBC 4.78   HGB 12.8   HCT 42.0   MCV 87.9   MCH 26.8*   MCHC 30.5*   RDW 17.2*      MPV 11.2*       Recent Labs   Lab 12/02/24  0525 12/02/24  0550     --    K 5.5*  --      --    CO2 23  --    BUN 21.2*  --    CREATININE 1.68*  --    CALCIUM 8.5  --    PH  --  7.360   MG 1.70  --    ALBUMIN 3.6  --    ALKPHOS 86  --    ALT 24  --    AST 43*  --    BILITOT 1.0  --        Microbiology Results (last 7 days)       ** No results found for the last 168 hours. **             X-Ray Chest AP Portable  Narrative: EXAMINATION:  XR CHEST AP PORTABLE    CLINICAL HISTORY:  Shortness of breath    TECHNIQUE:  Single view of the  chest    COMPARISON:  07/14/2022    FINDINGS:  Cardiomegaly is with prominent interstitial markings greatest in the bilateral lower lobes.  Recommend further evaluation with two views of the chest.  Volume overload not excluded.  Impression: As above.    Electronically signed by: Shreyas Santos  Date:    12/02/2024  Time:    06:12      ASSESSMENT & PLAN:     Acute on chronic CHF exacerbation EF 20-25%   Acute bilateral pulmonary edema with hypoxemia, improving   Hypertensive emergency   Hyperkalemia, mild   Coronary artery disease   Carotid artery stenosis   BPAD   Type 2 diabetes mellitus-hemoglobin A1c 5.6 on 12/02/2024  Chronic kidney disease stage 3  Essential hypertension   Dyslipidemia   GERD  Former smoker    -Continue [facility] consult and appreciate their recommendations  -Agree with TTE and follow up results   -Ordered IV Lasix  -Ordered IV hydralazine PRN   -Consult PT, accurate I's and O's, and cardiac diet  -Continue supplemental oxygen nation therapies via nasal cannula to keep SpO2 greater than 90%   -Continue telemetry   -Resumed appropriate home medications, ISS with Accu-Cheks, and supportive cares ordered   -No Evidence of infection on diagnostic evaluation   -A.m. labs-CBC, CMP, magnesium      VTE Prophylaxis: will be placed on SCD for DVT prophylaxis and will be advised to be as mobile as possible and sit in a chair as tolerated    Patient condition:  Fair  __________________________________________________________________________  INPATIENT LIST OF MEDICATIONS     Scheduled Meds:   aspirin  81 mg Oral Daily    atorvastatin  40 mg Oral Daily PM    carvediloL  12.5 mg Oral BID WM    clopidogreL  75 mg Oral Daily    furosemide (LASIX) injection  40 mg Intravenous Q12H    gabapentin  600 mg Oral TID    pantoprazole  40 mg Oral Daily    sacubitriL-valsartan  1 tablet Oral BID     Continuous Infusions:  PRN Meds:.  Current Facility-Administered Medications:     acetaminophen, 650 mg, Oral, Q4H PRN     acetaminophen, 650 mg, Oral, Q8H PRN    dextrose 10%, 12.5 g, Intravenous, PRN    dextrose 10%, 25 g, Intravenous, PRN    dextrose 10%, 25 g, Intravenous, PRN    glucagon (human recombinant), 1 mg, Intramuscular, PRN    glucagon (human recombinant), 1 mg, Intramuscular, PRN    glucose, 16 g, Oral, PRN    glucose, 24 g, Oral, PRN    hydrALAZINE, 10 mg, Intravenous, Q4H PRN    influenza (adjuvanted), 0.5 mL, Intramuscular, Prior to discharge    insulin aspart U-100, 0-10 Units, Subcutaneous, Q6H PRN    melatonin, 6 mg, Oral, Nightly PRN    naloxone, 0.02 mg, Intravenous, PRN    ondansetron, 4 mg, Intravenous, Q8H PRN    sodium chloride 0.9%, 10 mL, Intravenous, Q12H PRN      IManuel have reviewed and discussed the case with Dr. Nam  Please see the following addendum for further assessment and plan from there attending MD.    12/02/2024    ________________________________________________________________________________    MD Addendum:  I, Dr. Rylie Nam MD assumed care of this patient today   For the patient encounter, I performed the substantive portion of the visit, I reviewed the NP/PA documentation, treatment plan, and medical decision making.  I had face to face time with this patient     89-year-old female with prior history of heart failure with reduced ejection fraction and diastolic dysfunction, hypotension, diabetes, hyperlipidemia presented to  [facility] ED 12/02/2024 for evaluation of acute onset shortness of breath.  Patient reported being compliant to her medications but questionable compliance to diet  few couple of days due to Thanksgiving holiday season.  Daughter present at bedside, she reported usually patient's sleeps in a recliner but yesterday she attempted to sleep on a bed unable to lie flat ended up having coughing spell and did not resolve associated with shortness of breath.  Suspected her blood pressure is high due to severe cough , reported usually her blood pressure is  under control.    Home medications aspirin, statin, Plavix, Lasix 20 daily, Entresto b.i.d. gabapentin t.i.d., Protonix, tramadol    On arrival to ED patient was hypertensive in the range of 200/150 tachycardic 120, hypoxic 87% on 4 L nasal cannula . labs revealed negative troponin elevated BNP, chest x-ray with evidence of vascular congestion , EKG - sinus tachycardia.  Patient received IV diuretics , BiPAP support in the ED. her vitals have improved since then.    During my interview she was on nasal cannula 2 L 96% saturating blood pressure has improved to 160s    Exam   General: NAD  Chest:  Bibasilar crackles with diminished sounds at bases  Heart:  Regular rate  Abdomen: Soft   Extremities: Bilateral pedal edema    MDM   Acute on chronic CHF exacerbation EF 20-25%   Acute bilateral pulmonary edema with hypoxia, improving   Hypertensive emergency improving  Hyperkalemia, mild   CKD     Monitor on tele   Continue IV diuresis, strict Is&Os, daily weights, monitor renal function follow up echo  Cardiology evaluation  Monitor blood pressure, continue home medications, titrate antihypertensives   discussed tight blood pressure control, low-salt diet with the patient as well as patient's daughter at bedside the verbalized understanding importance of tight blood pressure control in the setting of low EF  Repeat BMP for potassium, expect improvement with IV diuresis, give Lokelma 5 x1    Critical care time spent: 40 minutes   Critical care diagnosis:  Decompensated heart failure needing IV diuresis    Rylie Nam MD    12/02/2024

## 2024-12-02 NOTE — PLAN OF CARE
Problem: Physical Therapy  Goal: Physical Therapy Goal  Description: Goals to be met by: 25     Patient will increase functional independence with mobility by performin. Supine to sit with [place]  2. Sit to supine with [place]  3. Sit to stand transfer with Modified [place]  4. Gait  x 300 feet with Modified [place] using Rolling Walker.   5. Ascend/descend 2 stairs with bilateral Handrails & Modified [place]     Outcome: Progressing

## 2024-12-02 NOTE — PT/OT/SLP EVAL
Physical Therapy Evaluation    Patient Name:  Porsha Horan   MRN:  [id number]    Recommendations:     Discharge therapy intensity: Low Intensity Therapy   Discharge Equipment Recommendations: none   Barriers to discharge: Ongoing medical needs    Assessment:     Porsha Horan is a 89 y.o. female admitted with SOB. Prior to admit, patient lived with daughter in a SLH with 2 steps (bilateral rails) to enter. At baseline, she is independent and ambulates with a SPC.  She presents with the following impairments/functional limitations: weakness, impaired endurance, impaired self care skills, impaired functional mobility, gait instability, impaired balance, decreased safety awareness. She required MIN A for bed mobility. MIN A for sit<>stand. Ambulated 52 ft with RW & CGA. Sats 91%-97% while on room air. Patient to benefit from skilled PT to address deficits, improve functional mobility, and progress towards functional independence. Recommending low intensity therapy at discharge. Progress as tolerated.    Rehab Prognosis: Good; patient would benefit from acute skilled PT services to address these deficits and reach maximum level of function.    Recent Surgery: * No surgery found *      Plan:     During this hospitalization, patient would benefit from acute PT services 5 x/week to address the identified rehab impairments via gait training, therapeutic activities, therapeutic exercises, neuromuscular re-education and progress toward the following goals:    Plan of Care Expires:  01/02/25    Subjective     Chief Complaint: none  Patient/Family Comments/goals: none  Pain/Comfort:  Pain Rating 1: 0/10    Patients cultural, spiritual, Yarsanism conflicts given the current situation: no    Living Environment:  Prior to admit, patient lived with daughter in a SLH with 2 steps (bilateral rails) to enter. At baseline, she is independent and ambulates with a SPC. Equipment used at home: blood pressure machine, cane,  straight.  DME owned (not currently used): none.  Upon discharge, patient will have assistance from family.    Objective:     Communicated with nurse prior to session.  Patient found supine with telemetry, oxygen  upon PT entry to room.    General Precautions: Standard, fall  Orthopedic Precautions:N/A   Braces: N/A  Respiratory Status: Nasal cannula, flow 2 L/min. Taken off for session. Sats 91%-97%     Exams:  Cognitive Exam:  Patient is oriented to Person, Place, Time, and Situation  Sensation: -       Intact  RLE ROM: WFL  RLE Strength: -4/5 grossly  LLE ROM: WFL  LLE Strength: -4/5 grossly  Skin integrity: Visible skin intact      Functional Mobility:  Bed Mobility:  Supine to Sit: minimum assistance  Transfers:  Sit to Stand:  minimum assistance with rolling walker  Gait: Ambulated 52 ft with RW & CGA. No major LOB  Balance: fair      AM-PAC 6 CLICK MOBILITY  Total Score:18     Education Provided:  Role and goals of PT, transfer training, bed mobility, gait training, balance training, safety awareness, assistive device, strengthening exercises, and importance of participating in PT to return to PLOF.    Patient left up in chair with all lines intact, call button in reach, daughter present, and educated on calling for assistance when ready to return to bed .    GOALS:   Multidisciplinary Problems       Physical Therapy Goals          Problem: Physical Therapy    Goal Priority Disciplines Outcome Interventions   Physical Therapy Goal     PT, PT/OT Progressing    Description: Goals to be met by: 25     Patient will increase functional independence with mobility by performin. Supine to sit with [place]  2. Sit to supine with [place]  3. Sit to stand transfer with Modified [place]  4. Gait  x 300 feet with Modified [place] using Rolling Walker.   5. Ascend/descend 2 stairs with bilateral Handrails & Modified [place]                          History:     Past Medical History:    Diagnosis Date    Atherosclerosis of both carotid arteries     CKD (chronic kidney disease) stage 2, GFR 60-89 ml/min     Diabetes mellitus     Hyperlipidemia     Hypertension     PAD (peripheral artery disease)        Past Surgical History:   Procedure Laterality Date    ANGIOPLASTY      CARDIAC CATHETERIZATION      HYSTERECTOMY      LEFT HEART CATHETERIZATION N/A 07/14/2022    Procedure: Left heart cath;  Surgeon: Mitul Mabry MD;  Location: [facility] CATH LAB;  Service: Cardiology;  Laterality: N/A;    LEFT HEART CATHETERIZATION Left 4/10/2024    Procedure: Left heart cath;  Surgeon: Basil Davies MD;  Location: [facility] CATH LAB;  Service: Cardiology;  Laterality: Left;  LHC +/- PCI       Time Tracking:     PT Received On: 12/02/24  PT Start Time: 1330     PT Stop Time: 1350  PT Total Time (min): 20 min     Billable Minutes: Evaluation 20 minutes      12/02/2024

## 2024-12-02 NOTE — NURSING
Admission documentation completed by the admit nurse. Home med rec complete. Pt did elect for meds to bed with [facility]. 4 Eyes and standing weight to be completed by the admitting floor nurse.  Pt requested flu vaccine prior to dc. Pt reported unintentional weight loss approx 10lbs.

## 2024-12-02 NOTE — ED PROVIDER NOTES
Encounter Date: 12/2/2024       History     Chief Complaint   Patient presents with    Shortness of Breath     Arrives [facility] c/o SOB. Initial O2 90% on RA, EMS placed pt on CPAP en route, not on pt on arrival, pt unable to tolerate per EMS. HTN, tachycardic, tachypneic.      Porsha Horan is a 89 y.o. female with a past medical history of HTN, DM, CHF, CKD who presents to the ED for shortness of breath.  EMS reports patient initial room air sats in the 80s to 90s.  Patient placed on CPAP with improvement however she began to become anxious and pulled off her mask so she arrives with a nasal cannula in place.  She denies any chest pain but does report ongoing shortness of breath.         Review of patient's allergies indicates:   Allergen Reactions    Tirofiban      Other reaction(s): Thrombocytopenia     Past Medical History:   Diagnosis Date    Atherosclerosis of both carotid arteries     CKD (chronic kidney disease) stage 2, GFR 60-89 ml/min     Diabetes mellitus     Hyperlipidemia     Hypertension     PAD (peripheral artery disease)      Past Surgical History:   Procedure Laterality Date    ANGIOPLASTY      CARDIAC CATHETERIZATION      HYSTERECTOMY      LEFT HEART CATHETERIZATION N/A 07/14/2022    Procedure: Left heart cath;  Surgeon: Mitul Mabry MD;  Location: [facility] CATH LAB;  Service: Cardiology;  Laterality: N/A;    LEFT HEART CATHETERIZATION Left 4/10/2024    Procedure: Left heart cath;  Surgeon: Basil Davies MD;  Location: [facility] CATH LAB;  Service: Cardiology;  Laterality: Left;  LHC +/- PCI     No family history on file.  Social History     Tobacco Use    Smoking status: Former     Types: Cigarettes    Smokeless tobacco: Never     Review of Systems   Constitutional:  Negative for fever.   HENT:  Negative for sore throat.    Respiratory:  Positive for cough, shortness of breath and wheezing.    Cardiovascular:  Negative for chest pain.   Gastrointestinal:  Negative for nausea.   Genitourinary:   Negative for dysuria.   Musculoskeletal:  Negative for back pain.   Skin:  Negative for rash.   Neurological:  Negative for weakness.   Hematological:  Does not bruise/bleed easily.       Physical Exam     Initial Vitals [12/02/24 0512]   BP Pulse Resp Temp SpO2   (!) 239/153 (!) 120 (!) 35 98.4 °F (36.9 °C) (!) 87 %      MAP       --         Physical Exam    Nursing note and vitals reviewed.  Constitutional:   Ill-appearing, tachypneic, tachycardic   HENT:   Head: Normocephalic and atraumatic.   Eyes: EOM are normal. Pupils are equal, round, and reactive to light.   Cardiovascular:            Tachycardic, regular rhythm   Pulmonary/Chest: She has rales.   Abdominal: Abdomen is soft. Bowel sounds are normal.     Neurological: She is alert.   Skin: Capillary refill takes less than 2 seconds.         ED Course   Critical Care    Date/Time: 12/2/2024 8:22 AM    Performed by: Neymar Vidal MD  Authorized by: Neymar Vidal MD  Direct patient critical care time: 45 minutes  Total critical care time (exclusive of procedural time) : 45 minutes  Critical care time was exclusive of separately billable procedures and treating other patients.  Critical care was necessary to treat or prevent imminent or life-threatening deterioration of the following conditions: cardiac failure and circulatory failure.  Critical care was time spent personally by me on the following activities: discussions with consultants, development of treatment plan with patient or surrogate, evaluation of patient's response to treatment, examination of patient, obtaining history from patient or surrogate, ordering and performing treatments and interventions, ordering and review of laboratory studies, ordering and review of radiographic studies, pulse oximetry, re-evaluation of patient's condition and review of old charts.        Labs Reviewed   COMPREHENSIVE METABOLIC PANEL - Abnormal       Result Value    Sodium 141      Potassium 5.5 (*)      Chloride 104      CO2 23      Glucose 314 (*)     Blood Urea Nitrogen 21.2 (*)     Creatinine 1.68 (*)     Calcium 8.5      Protein Total 7.6      Albumin 3.6      Globulin 4.0 (*)     Albumin/Globulin Ratio 0.9 (*)     Bilirubin Total 1.0      ALP 86      ALT 24      AST 43 (*)     eGFR 29      Anion Gap 14.0      BUN/Creatinine Ratio 13     B-TYPE NATRIURETIC PEPTIDE - Abnormal    Natriuretic Peptide 1,123.3 (*)    CBC WITH DIFFERENTIAL - Abnormal    WBC 11.45      RBC 4.78      Hgb 12.8      Hct 42.0      MCV 87.9      MCH 26.8 (*)     MCHC 30.5 (*)     RDW 17.2 (*)     Platelet 264      MPV 11.2 (*)     Neut % 87.2      Lymph % 7.6      Mono % 3.9      Eos % 0.4      Basophil % 0.5      Lymph # 0.87      Neut # 9.97 (*)     Mono # 0.45      Eos # 0.05      Baso # 0.06      IG# 0.05 (*)     IG% 0.4      NRBC% 0.0     BLOOD GAS - Abnormal    Sample Type Arterial Blood      Sample site Right Radial Artery      Drawn by [facility] CRT      pH, Blood gas 7.360      pCO2, Blood gas 46.0 (*)     pO2, Blood gas 85.0      Sodium, Blood Gas 133 (*)     Potassium, Blood Gas 4.2      Calcium Level Ionized 1.08 (*)     TOC2, Blood gas 27.4      Base Excess, Blood gas 0.10      sO2, Blood gas 96.0      HCO3, Blood gas 26.0      THb, Blood gas 13.2      O2 Hb, Blood Gas 94.5      CO Hgb 2.7 (*)     Met Hgb 1.1      Allens Test Yes      MODE BiPAP      FIO2, Blood gas 40.0      Mech RR 14      BiPAP (I) 12      BiPAP (E) 6     TROPONIN I - Normal    Troponin-I <0.010     PROTIME-INR - Normal    PT 13.5      INR 1.1     MAGNESIUM - Normal    Magnesium Level 1.70     COVID/RSV/FLU A&B PCR - Normal    Influenza A PCR Not Detected      Influenza B PCR Not Detected      Respiratory Syncytial Virus PCR Not Detected      SARS-CoV-2 PCR Not Detected      Narrative:     The Xpert Xpress SARS-CoV-2/FLU/RSV plus is a rapid, multiplexed real-time PCR test intended for the simultaneous qualitative detection and differentiation of SARS-CoV-2,  Influenza A, Influenza B, and respiratory syncytial virus (RSV) viral RNA in either nasopharyngeal swab or nasal swab specimens.         CBC W/ AUTO DIFFERENTIAL    Narrative:     The following orders were created for panel order CBC auto differential.  Procedure                               Abnormality         Status                     ---------                               -----------         ------                     CBC with Differential[[id number]]       Abnormal            Final result                 Please view results for these tests on the individual orders.          Imaging Results              X-Ray Chest AP Portable (Final result)  Result time 12/02/24 06:12:32      Final result by Shreyas Santos MD (12/02/24 06:12:32)                   Impression:      As above.      Electronically signed by: Shreyas Santos  Date:    12/02/2024  Time:    06:12               Narrative:    EXAMINATION:  XR CHEST AP PORTABLE    CLINICAL HISTORY:  Shortness of breath    TECHNIQUE:  Single view of the chest    COMPARISON:  07/14/2022    FINDINGS:  Cardiomegaly is with prominent interstitial markings greatest in the bilateral lower lobes.  Recommend further evaluation with two views of the chest.  Volume overload not excluded.                                       Medications   nitroGLYCERIN 2% TD oint ointment 1 inch (1 inch Transdermal Given 12/2/24 0527)   furosemide injection 60 mg (60 mg Intravenous Given 12/2/24 0545)     Medical Decision Making  Problems Addressed:  Acute pulmonary edema: acute illness or injury  CHF exacerbation: acute illness or injury  Shortness of breath: acute illness or injury    Amount and/or Complexity of Data Reviewed  Labs: ordered.  Radiology: ordered.    Risk  Prescription drug management.  Decision regarding hospitalization.      Differential diagnosis (includes but is not limited to):   Volume overload, scape, CHF exacerbation, ACS, arrhythmia, electrolyte abnormalities,  dehydration, kidney injury    MDM Narrative  89-year-old female presents for shortness of breath.  Patient tachypneic, tachycardic, answers in short sentences.  Patient is severely hypotensive, tachycardic, tachypneic.  Patient was initially transition to an OxyMask on arrival however on my assessment, patient would benefit from BiPAP, patient agreeable.  Transitioned to BiPAP.  1 in of nitro paste placed on chest.  Chest x-ray reviewed with evidence of volume overload and pulmonary edema.  Lasix given with improved diuresis.  EKG reviewed.  Labs reviewed.  Troponin negative.  BNP elevated.  Patient has been come much more comfortable on BiPAP.  Work of breathing is improved, heart rate within normal limits, respiratory rate improved.  Blood pressure improving.  Echo ordered.  Cardiology consulted, appreciate recommendations.  Case discussed with hospitalist, will admit for further care.      Dispo: Admit    My independent radiology interpretation: as above  Point of care US (independently performed and interpreted):   Decision rules/clinical scoring:     Sepsis Perfusion Assessment:     Amount and/or Complexity of Data Reviewed  Independent historian: EMS   Summary of history: report received on arrival  External data reviewed: notes from previous ED visits and notes from clinic visits  Summary of data reviewed: Prior records reviewed  Risk and benefits of testing: discussed   Labs: ordered and reviewed  Radiology: ordered and independent interpretation performed (see above or ED course)  ECG/medicine tests: ordered and independent interpretation performed (see above or ED course)  Discussion of management or test interpretation with external provider(s): discussed with hospitalist physician and discussed with cardiology consultant   Summary of discussion: as above    Risk  Parenteral controlled substances   Drug therapy requiring intense monitoring for toxicity   Decision regarding hospitalization  Shared decision  making     Critical Care  30-74 minutes     Data Reviewed/Counseling: I have personally reviewed the patient's vital signs, nursing notes, and other relevant tests, information, and imaging. I had a detailed discussion regarding the historical points, exam findings, and any diagnostic results supporting the discharge diagnosis. I personally performed the history, PE, MDM and procedures as documented above and agree with the scribe's documentation.    Portions of this note were dictated using voice recognition software. Although it was reviewed for accuracy, some inherent voice recognition errors may have occurred and may be present in this document.             ED Course as of 12/02/24 0824   Mon Dec 02, 2024   0515 EKG independently interpreted by me.  EKG: ST @ 129, no STEMI, Qtc 430 []   0522 Last Echo 7/11/2022:     · The left ventricle is normal in size with mild concentric hypertrophy and severely decreased systolic function.  · The estimated ejection fraction is 25%.  · There is severe left ventricular global hypokinesis.  · Grade II left ventricular diastolic dysfunction.  · Mild aortic regurgitation.  · Mild to moderate tricuspid regurgitation.  · There is moderate pulmonary hypertension.  · The estimated PA systolic pressure is 52 mmHg.  · Intermediate central venous pressure (8 mmHg).   [MC]   0522 Patient tachypneic, hypoxic, hypertensive, crackles bilaterally.  EMS reports improvement with CPAP EN route before patient got answering and took the mask off.  Patient agreeable to trial of BiPAP, will start BiPAP and monitor hemodynamics and respiratory status. []   0530 X-Ray Chest AP Portable  Independently visualized/reviewed by me during the ED visit.  - Cardiomegaly with vascular congestion and pulmonary edema [MC]   0547 Blood pressure improving, oxygen sats within normal limits, heart rate improving; patient is tolerating BiPAP well. []      ED Course User Index  [] Neymar Vidal MD                            Clinical Impression:  Final diagnoses:  [R06.02] Shortness of breath  [I50.9] CHF exacerbation  [J81.0] Acute pulmonary edema (Primary)          ED Disposition Condition    Admit Stable                Neymar Vidal MD  12/02/24 0837

## 2024-12-02 NOTE — PLAN OF CARE
"Pt is , Neymar Horan (#?), 6 living children, no POA or Living will. Daughter reports a need for sitters or HH.    12/02/24 0910   Discharge Assessment   Assessment Type Discharge Planning Assessment   Confirmed/corrected address, phone number and insurance Yes   Confirmed Demographics Correct on Facesheet   Source of Information family   When was your last doctors appointment?   (PCP [place])   Communicated [facility] with patient/caregiver Date not available/Unable to determine   People in Home child(claudia), adult   Do you expect to return to your current living situation? Yes   Do you have help at home or someone to help you manage your care at home? Yes   Who are your caregiver(s) and their phone number(s)? lives with daughter, Fabiana Napier [phone]   Prior to hospitilization cognitive status: Unable to Assess   Current cognitive status: Unable to Assess   Walking or Climbing Stairs Difficulty yes   Walking or Climbing Stairs ambulation difficulty, requires equipment   Mobility Management walking stick   Dressing/Bathing Difficulty no   Home Accessibility stairs to enter home   Number of Stairs, Main Entrance two   Home Layout Able to live on 1st floor   Equipment Currently Used at Home blood pressure machine;other (see comments)  (walking stick)   Readmission within 30 days? No   Patient currently being followed by outpatient case management? No   Do you currently have service(s) that help you manage your care at home? No   Do you take prescription medications? Yes  (fills with [facility])   Do you have prescription coverage? Yes   Coverage [employer]   Do you have any problems affording any of your prescribed medications? Yes   If yes, what medications? "heart pill" they plan on speaking to Dr about prescribing something comparable   Is the patient taking medications as prescribed? yes   Who is going to help you get home at discharge? daughter or grandson   How do you get to doctors appointments? family " or friend will provide   Are you on dialysis? No   Discharge Plan A Other  (TBD)   DME Needed Upon Discharge  other (see comments)  (TBD)   Discharge Plan discussed with: Adult children   Transition of Care Barriers None   OTHER   Name(s) of People in Home daughter Fabiana Napier

## 2024-12-02 NOTE — CONSULTS
Inpatient consult to Cardiology  Consult performed by: Neisha Herbert FNP  Consult ordered by: Neymar Vidal MD  Reason for consult: Heart Failure        [facility] *    Cardiology  Consult Note    Patient Name: Porsha Horan  MRN: [id number]  Admission Date: 12/2/2024  Hospital Length of Stay: 0 days  Code Status: Full Code   Attending Provider: Rylie Nam MD   Consulting Provider: CHARITO Mcgee  Primary Care Physician: Jayy Allen MD  Principal Problem:<principal problem not specified>    Patient information was obtained from patient, past medical records, ER records, and primary team.     Subjective:   Chief Complaint/Reason for Consult: Heart Failure    HPI:   Ms. oHran is a 89 year old female, known to Dr. Davies, who presented to the hospital with shortness of breath. Reported recent salty food intake related to Thanksgiving Holiday. Upon arrival, patient noted to be significantly hypertensive. Also hypoxic requiring NC oxygen support. BNP noted to be  1190 23, normal troponin, hemoglobin A1c was 5.6, negative viral swabs. CXR showed increased cardiac silhouette with increased interstitial markings bilaterally concerning for pulmonary edema. EKG showed sinus tachycardia 129 bpm. Patient was treated in the ED with IV Lasix and nitroglycerin paste. [facility] consulted for cardiac management.    PMH: CAD/RCA Stenting, PAD BLE, HTN, JAYLA, HLD, T2DM with CKD 2  PSH: Peripheral angiogram; partial hysterectomy  Family History: Father- HTN, Cancer-reason for death.  Mother HTN, Cancer- reason for death  Social History: Tobacco- Former Smoker, Alcohol- Negative, Substance Abuse- Negative     Previous Cardiac Diagnostics:   Echocardiogram (6.11.24):  The study quality is average.   Limited echo to assess left ventricular function.  Global left ventricular systolic function is normal. The left ventricular ejection fraction is 55%.     Coronary Angiogram with PCI (4.10.24):  Findings    Left main normal   Lad widely patent   Left circumflex normal   RCA reveals the 80% calcific stenosis   Summary  Patient underwent successful balloon angioplasty stenting of the proximal RCA with shockwave lithotripsy from right groin approach.    PET (3.22.24):  This is an abnormal perfusion study. Study is consistent with ischemia.   This scan is suggestive of moderate risk for future cardiovascular events.   Large partially reversible perfusion abnormality of severe intensity in the lateral region.   Perfusion imaging is suggestive of single vessel disease. Perfusion defect is in the distribution of left circumflex artery.   The left ventricular cavity is noted to be normal on the stress studies. The stress left ventricular ejection fraction was calculated to be 47% and left ventricular global function is mildly reduced. The rest left ventricular cavity is noted to be normal. The rest left ventricular ejection fraction was calculated to be 50% and rest left ventricular global function is normal.   Persistent hypokinesis of the anterior region, septal region, inferior region and lateral region is noted in both rest and stress studies. When compared to the resting ejection fraction (50%), the stress ejection fraction (47%) has decreased.   The study quality is good.   Myocardial blood flow reserve was not performed in this patient due to specific concerns that can affect accuracy.    CUS (12.6.22):  The study quality is average.   40-59% stenosis in the mid right internal carotid artery based on Bluth Criteria.   40-59% stenosis in the mid left internal carotid artery based on Bluth Criteria.   The right vertebral artery is poorly visualized.   Antegrade left vertebral artery flow.     Peripheral angiogram 02/04/2021:  100% occluded right SFA  50% Left SFA  S/p PTA/stent R SFA and PTA of right AT artery  Left SFA stenosis to be managed medically    Review of patient's allergies indicates:   Allergen Reactions     Tirofiban      Other reaction(s): Thrombocytopenia     No current facility-administered medications on file prior to encounter.     Current Outpatient Medications on File Prior to Encounter   Medication Sig    aspirin (ECOTRIN) 81 MG EC tablet Take 81 mg by mouth once daily.    atorvastatin (LIPITOR) 40 MG tablet SMARTSI Tablet(s) By Mouth Every Evening    carvediloL (COREG) 12.5 MG tablet Take 12.5 mg by mouth 2 (two) times daily with meals.    clopidogreL (PLAVIX) 75 mg tablet Take 75 mg by mouth once daily.    furosemide (LASIX) 20 MG tablet Take 1 tablet (20 mg total) by mouth once daily.    gabapentin (NEURONTIN) 600 MG tablet Take 600 mg by mouth 3 (three) times daily.    pantoprazole (PROTONIX) 40 MG tablet Take 40 mg by mouth once daily.    sacubitriL-valsartan (ENTRESTO) 49-51 mg per tablet Take 1 tablet by mouth 2 (two) times daily.    traMADoL (ULTRAM) 50 mg tablet Take 50 mg by mouth every 6 (six) hours as needed for Pain.    nitroGLYCERIN (NITROSTAT) 0.4 MG SL tablet Place 1 tablet (0.4 mg total) under the tongue every 5 (five) minutes as needed for Chest pain. (Patient not taking: Reported on 2024)    promethazine (PHENERGAN) 25 MG tablet Take 25 mg by mouth every 6 (six) hours as needed for Nausea.    [DISCONTINUED] hydroCHLOROthiazide (HYDRODIURIL) 25 MG tablet Take 25 mg by mouth every morning.     Review of Systems   Respiratory:  Positive for shortness of breath. Negative for chest tightness.    Cardiovascular:  Negative for chest pain.   All other systems reviewed and are negative.    Objective:     Vital Signs (Most Recent):  Temp: 98.4 °F (36.9 °C) (24 1200)  Pulse: 80 (24 1200)  Resp: 19 (24 0812)  BP: (!) 163/81 (24 1200)  SpO2: 97 % (24 1200) Vital Signs (24h Range):  Temp:  [98.4 °F (36.9 °C)] 98.4 °F (36.9 °C)  Pulse:  [] 80  Resp:  [14-42] 19  SpO2:  [87 %-99 %] 97 %  BP: (163-239)/() 163/81   Weight: 107 kg (236 lb)  Body mass index is  39.27 kg/m².  SpO2: 97 %       Intake/Output Summary (Last 24 hours) at 12/2/2024 1251  Last data filed at 12/2/2024 0657  Gross per 24 hour   Intake --   Output 600 ml   Net -600 ml     Lines/Drains/Airways       Peripheral Intravenous Line  Duration                  Peripheral IV - Single Lumen 12/02/24 0700 18 G Right Antecubital <1 day                  Significant Labs:   Chemistries:   Recent Labs   Lab 12/02/24  0525      K 5.5*      CO2 23   BUN 21.2*   CREATININE 1.68*   CALCIUM 8.5   BILITOT 1.0   ALKPHOS 86   ALT 24   AST 43*   GLUCOSE 314*   MG 1.70   TROPONINI <0.010        CBC/Anemia Labs: Coags:    Recent Labs   Lab 12/02/24  0525   WBC 11.45   HGB 12.8   HCT 42.0      MCV 87.9   RDW 17.2*    Recent Labs   Lab 12/02/24  0525   INR 1.1        Significant Imaging:  Imaging Results              X-Ray Chest AP Portable (Final result)  Result time 12/02/24 06:12:32      Final result by Shreyas Santos MD (12/02/24 06:12:32)                   Impression:      As above.      Electronically signed by: Shreyas Santos  Date:    12/02/2024  Time:    06:12               Narrative:    EXAMINATION:  XR CHEST AP PORTABLE    CLINICAL HISTORY:  Shortness of breath    TECHNIQUE:  Single view of the chest    COMPARISON:  07/14/2022    FINDINGS:  Cardiomegaly is with prominent interstitial markings greatest in the bilateral lower lobes.  Recommend further evaluation with two views of the chest.  Volume overload not excluded.                                    EKG:       Telemetry:  Sinus Tachycardia    Physical Exam  Vitals and nursing note reviewed.   Constitutional:       Appearance: Normal appearance.   HENT:      Head: Normocephalic.      Mouth/Throat:      Mouth: Mucous membranes are moist.      Pharynx: Oropharynx is clear.   Cardiovascular:      Rate and Rhythm: Regular rhythm. Tachycardia present.      Heart sounds: Normal heart sounds.   Pulmonary:      Effort: Pulmonary effort is normal. No  respiratory distress.      Breath sounds: Rales present.      Comments: NC  Abdominal:      Palpations: Abdomen is soft.      Tenderness: There is no abdominal tenderness. There is no guarding.   Musculoskeletal:      Right lower leg: Edema present.      Left lower leg: Edema present.      Comments: BLE Warm   Skin:     General: Skin is warm and dry.   Neurological:      Mental Status: She is alert.   Psychiatric:         Behavior: Behavior normal.       Home Medications:   No current facility-administered medications on file prior to encounter.     Current Outpatient Medications on File Prior to Encounter   Medication Sig Dispense Refill    aspirin (ECOTRIN) 81 MG EC tablet Take 81 mg by mouth once daily.      atorvastatin (LIPITOR) 40 MG tablet SMARTSI Tablet(s) By Mouth Every Evening      carvediloL (COREG) 12.5 MG tablet Take 12.5 mg by mouth 2 (two) times daily with meals.      clopidogreL (PLAVIX) 75 mg tablet Take 75 mg by mouth once daily.      furosemide (LASIX) 20 MG tablet Take 1 tablet (20 mg total) by mouth once daily. 30 tablet 0    gabapentin (NEURONTIN) 600 MG tablet Take 600 mg by mouth 3 (three) times daily.      pantoprazole (PROTONIX) 40 MG tablet Take 40 mg by mouth once daily.      sacubitriL-valsartan (ENTRESTO) 49-51 mg per tablet Take 1 tablet by mouth 2 (two) times daily. 60 tablet 0    traMADoL (ULTRAM) 50 mg tablet Take 50 mg by mouth every 6 (six) hours as needed for Pain.      nitroGLYCERIN (NITROSTAT) 0.4 MG SL tablet Place 1 tablet (0.4 mg total) under the tongue every 5 (five) minutes as needed for Chest pain. (Patient not taking: Reported on 2024) 30 tablet 0    promethazine (PHENERGAN) 25 MG tablet Take 25 mg by mouth every 6 (six) hours as needed for Nausea.      [DISCONTINUED] hydroCHLOROthiazide (HYDRODIURIL) 25 MG tablet Take 25 mg by mouth every morning.       Current Schedule Inpatient Medications:   aspirin  81 mg Oral Daily    atorvastatin  40 mg Oral Daily PM     carvediloL  12.5 mg Oral BID WM    clopidogreL  75 mg Oral Daily    furosemide (LASIX) injection  40 mg Intravenous Q12H    gabapentin  600 mg Oral TID    pantoprazole  40 mg Oral Daily    sacubitriL-valsartan  1 tablet Oral BID       Assessment:   Acute on Chronic Diastolic HF    - EF 55% (Echo 6.11.24)  Hypertensive Emergency    - History of Hypertension  CAD    - Status Post RCA Stenting (4/2024)    - Troponin Normal  Hyperlipidemia  DM II  Chronic Kidney Disease Stage III  PAD  Former Smoker  Hyperkalemia (Mild)  GERD    Plan:   Continue Lasix 40 Mg IVP q12h; Ensure Accurate I/O  DC Entresto due to Hyperkalemia & Continue Aspirin/Statin  Hyperkalemia being managed by Primary Team  Start Procardia XL 30 mg Daily  Optimize Antihypertensives as Able. Suspect improvement in BP with Diuresis.  Low Salt Diet  Routine Labs in AM    Thank you for your consult.     CHARITO Mcgee  Cardiology  [facility]     I agree with the findings of the complexity of problems addressed and take responsibility for the plan's risks and complications. I approved the plan documented by Neisha Herbert NP.

## 2024-12-03 LAB
ALBUMIN SERPL-MCNC: 2.8 G/DL (ref 3.4–4.8)
ALBUMIN/GLOB SERPL: 0.9 RATIO (ref 1.1–2)
ALP SERPL-CCNC: 63 UNIT/L (ref 40–150)
ALT SERPL-CCNC: 15 UNIT/L (ref 0–55)
ANION GAP SERPL CALC-SCNC: 12 MEQ/L
APICAL FOUR CHAMBER EJECTION FRACTION: 48 %
APICAL TWO CHAMBER EJECTION FRACTION: 54 %
AST SERPL-CCNC: 16 UNIT/L (ref 5–34)
AV INDEX (PROSTH): 0.72
AV MEAN GRADIENT: 3 MMHG
AV PEAK GRADIENT: 6.8 MMHG
AV VALVE AREA BY VELOCITY RATIO: 2.4 CM²
AV VALVE AREA: 2.3 CM²
AV VELOCITY RATIO: 0.77
BASOPHILS # BLD AUTO: 0.03 X10(3)/MCL
BASOPHILS NFR BLD AUTO: 0.6 %
BILIRUB SERPL-MCNC: 0.9 MG/DL
BSA FOR ECHO PROCEDURE: 2.22 M2
BUN SERPL-MCNC: 23.3 MG/DL (ref 9.8–20.1)
CALCIUM SERPL-MCNC: 8.2 MG/DL (ref 8.4–10.2)
CHLORIDE SERPL-SCNC: 104 MMOL/L (ref 98–107)
CO2 SERPL-SCNC: 29 MMOL/L (ref 23–31)
CREAT SERPL-MCNC: 1.26 MG/DL (ref 0.55–1.02)
CREAT/UREA NIT SERPL: 18
CV ECHO LV RWT: 0.45 CM
DOP CALC AO PEAK VEL: 1.3 M/S
DOP CALC AO VTI: 26.2 CM
DOP CALC LVOT AREA: 3.1 CM2
DOP CALC LVOT DIAMETER: 2 CM
DOP CALC LVOT PEAK VEL: 1 M/S
DOP CALC LVOT STROKE VOLUME: 59 CM3
DOP CALC MV VTI: 41.5 CM
DOP CALCLVOT PEAK VEL VTI: 18.8 CM
E WAVE DECELERATION TIME: 209 MSEC
E/A RATIO: 2.02
E/E' RATIO: 19.17 M/S
ECHO LV POSTERIOR WALL: 1.1 CM (ref 0.6–1.1)
EOSINOPHIL # BLD AUTO: 0.03 X10(3)/MCL (ref 0–0.9)
EOSINOPHIL NFR BLD AUTO: 0.6 %
ERYTHROCYTE [DISTWIDTH] IN BLOOD BY AUTOMATED COUNT: 16.9 % (ref 11.5–17)
FRACTIONAL SHORTENING: 28.6 % (ref 28–44)
GFR SERPLBLD CREATININE-BSD FMLA CKD-EPI: 41 ML/MIN/1.73/M2
GLOBULIN SER-MCNC: 3.2 GM/DL (ref 2.4–3.5)
GLUCOSE SERPL-MCNC: 120 MG/DL (ref 82–115)
HCT VFR BLD AUTO: 36.2 % (ref 37–47)
HGB BLD-MCNC: 11.4 G/DL (ref 12–16)
HR MV ECHO: 62 BPM
IMM GRANULOCYTES # BLD AUTO: 0.01 X10(3)/MCL (ref 0–0.04)
IMM GRANULOCYTES NFR BLD AUTO: 0.2 %
INTERVENTRICULAR SEPTUM: 1 CM (ref 0.6–1.1)
LEFT ATRIUM AREA SYSTOLIC (APICAL 2 CHAMBER): 23.8 CM2
LEFT ATRIUM AREA SYSTOLIC (APICAL 4 CHAMBER): 19.4 CM2
LEFT ATRIUM SIZE: 4.3 CM
LEFT ATRIUM VOLUME INDEX MOD: 29.3 ML/M2
LEFT ATRIUM VOLUME MOD: 62.2 ML
LEFT INTERNAL DIMENSION IN SYSTOLE: 3.5 CM (ref 2.1–4)
LEFT VENTRICLE DIASTOLIC VOLUME INDEX: 52.83 ML/M2
LEFT VENTRICLE DIASTOLIC VOLUME: 112 ML
LEFT VENTRICLE END DIASTOLIC VOLUME APICAL 2 CHAMBER: 84.9 ML
LEFT VENTRICLE END DIASTOLIC VOLUME APICAL 4 CHAMBER: 79.9 ML
LEFT VENTRICLE END SYSTOLIC VOLUME APICAL 2 CHAMBER: 78.2 ML
LEFT VENTRICLE END SYSTOLIC VOLUME APICAL 4 CHAMBER: 43.4 ML
LEFT VENTRICLE MASS INDEX: 88.7 G/M2
LEFT VENTRICLE SYSTOLIC VOLUME INDEX: 23.7 ML/M2
LEFT VENTRICLE SYSTOLIC VOLUME: 50.2 ML
LEFT VENTRICULAR INTERNAL DIMENSION IN DIASTOLE: 4.9 CM (ref 3.5–6)
LEFT VENTRICULAR MASS: 188.1 G
LV LATERAL E/E' RATIO: 19.17 M/S
LV SEPTAL E/E' RATIO: 19.17 M/S
LVED V (TEICH): 112 ML
LVES V (TEICH): 50.2 ML
LVOT MG: 2 MMHG
LVOT MV: 0.6 CM/S
LYMPHOCYTES # BLD AUTO: 1.1 X10(3)/MCL (ref 0.6–4.6)
LYMPHOCYTES NFR BLD AUTO: 20.5 %
MAGNESIUM SERPL-MCNC: 1.5 MG/DL (ref 1.6–2.6)
MCH RBC QN AUTO: 27.1 PG (ref 27–31)
MCHC RBC AUTO-ENTMCNC: 31.5 G/DL (ref 33–36)
MCV RBC AUTO: 86.2 FL (ref 80–94)
MONOCYTES # BLD AUTO: 0.38 X10(3)/MCL (ref 0.1–1.3)
MONOCYTES NFR BLD AUTO: 7.1 %
MV MEAN GRADIENT: 3 MMHG
MV PEAK A VEL: 0.57 M/S
MV PEAK E VEL: 1.15 M/S
MV PEAK GRADIENT: 6 MMHG
MV STENOSIS PRESSURE HALF TIME: 69 MS
MV VALVE AREA BY CONTINUITY EQUATION: 1.42 CM2
MV VALVE AREA P 1/2 METHOD: 3.19 CM2
NEUTROPHILS # BLD AUTO: 3.82 X10(3)/MCL (ref 2.1–9.2)
NEUTROPHILS NFR BLD AUTO: 71 %
NRBC BLD AUTO-RTO: 0 %
OHS LV EJECTION FRACTION SIMPSONS BIPLANE MOD: 51 %
PISA TR MAX VEL: 2.77 M/S
PLATELET # BLD AUTO: 185 X10(3)/MCL (ref 130–400)
PMV BLD AUTO: 10.6 FL (ref 7.4–10.4)
POCT GLUCOSE: 176 MG/DL (ref 70–110)
POTASSIUM SERPL-SCNC: 3.2 MMOL/L (ref 3.5–5.1)
PROT SERPL-MCNC: 6 GM/DL (ref 5.8–7.6)
PV PEAK GRADIENT: 3 MMHG
PV PEAK VELOCITY: 0.84 M/S
RA PRESSURE ESTIMATED: 8 MMHG
RBC # BLD AUTO: 4.2 X10(6)/MCL (ref 4.2–5.4)
RV TB RVSP: 11 MMHG
SODIUM SERPL-SCNC: 145 MMOL/L (ref 136–145)
TDI LATERAL: 0.06 M/S
TDI SEPTAL: 0.06 M/S
TDI: 0.06 M/S
TR MAX PG: 31 MMHG
TRICUSPID ANNULAR PLANE SYSTOLIC EXCURSION: 1.74 CM
TV REST PULMONARY ARTERY PRESSURE: 39 MMHG
WBC # BLD AUTO: 5.37 X10(3)/MCL (ref 4.5–11.5)
Z-SCORE OF LEFT VENTRICULAR DIMENSION IN END DIASTOLE: -3.13
Z-SCORE OF LEFT VENTRICULAR DIMENSION IN END SYSTOLE: -1.23

## 2024-12-03 PROCEDURE — 63600175 PHARM REV CODE 636 W HCPCS

## 2024-12-03 PROCEDURE — 63600175 PHARM REV CODE 636 W HCPCS: Performed by: INTERNAL MEDICINE

## 2024-12-03 PROCEDURE — 99900035 HC TECH TIME PER 15 MIN (STAT)

## 2024-12-03 PROCEDURE — 25000003 PHARM REV CODE 250: Performed by: NURSE PRACTITIONER

## 2024-12-03 PROCEDURE — 25000003 PHARM REV CODE 250

## 2024-12-03 PROCEDURE — 99900031 HC PATIENT EDUCATION (STAT)

## 2024-12-03 PROCEDURE — 80053 COMPREHEN METABOLIC PANEL: CPT

## 2024-12-03 PROCEDURE — 94660 CPAP INITIATION&MGMT: CPT

## 2024-12-03 PROCEDURE — 85025 COMPLETE CBC W/AUTO DIFF WBC: CPT

## 2024-12-03 PROCEDURE — 83735 ASSAY OF MAGNESIUM: CPT

## 2024-12-03 PROCEDURE — 25000003 PHARM REV CODE 250: Performed by: INTERNAL MEDICINE

## 2024-12-03 PROCEDURE — 21400001 HC TELEMETRY ROOM

## 2024-12-03 PROCEDURE — 97116 GAIT TRAINING THERAPY: CPT | Mod: CQ

## 2024-12-03 PROCEDURE — 36415 COLL VENOUS BLD VENIPUNCTURE: CPT

## 2024-12-03 PROCEDURE — 27100171 HC OXYGEN HIGH FLOW UP TO 24 HOURS

## 2024-12-03 PROCEDURE — 94760 N-INVAS EAR/PLS OXIMETRY 1: CPT

## 2024-12-03 RX ORDER — MAGNESIUM SULFATE HEPTAHYDRATE 40 MG/ML
2 INJECTION, SOLUTION INTRAVENOUS ONCE
Status: COMPLETED | OUTPATIENT
Start: 2024-12-03 | End: 2024-12-03

## 2024-12-03 RX ORDER — POTASSIUM CHLORIDE 20 MEQ/1
40 TABLET, EXTENDED RELEASE ORAL DAILY
Status: DISCONTINUED | OUTPATIENT
Start: 2024-12-03 | End: 2024-12-04 | Stop reason: HOSPADM

## 2024-12-03 RX ADMIN — FUROSEMIDE 40 MG: 10 INJECTION, SOLUTION INTRAMUSCULAR; INTRAVENOUS at 08:12

## 2024-12-03 RX ADMIN — GABAPENTIN 600 MG: 300 CAPSULE ORAL at 02:12

## 2024-12-03 RX ADMIN — POTASSIUM CHLORIDE 40 MEQ: 1500 TABLET, EXTENDED RELEASE ORAL at 10:12

## 2024-12-03 RX ADMIN — CARVEDILOL 12.5 MG: 12.5 TABLET, FILM COATED ORAL at 04:12

## 2024-12-03 RX ADMIN — MAGNESIUM SULFATE HEPTAHYDRATE 2 G: 40 INJECTION, SOLUTION INTRAVENOUS at 02:12

## 2024-12-03 RX ADMIN — PANTOPRAZOLE SODIUM 40 MG: 40 TABLET, DELAYED RELEASE ORAL at 08:12

## 2024-12-03 RX ADMIN — ATORVASTATIN CALCIUM 40 MG: 40 TABLET, FILM COATED ORAL at 08:12

## 2024-12-03 RX ADMIN — CLOPIDOGREL BISULFATE 75 MG: 75 TABLET ORAL at 08:12

## 2024-12-03 RX ADMIN — NIFEDIPINE 30 MG: 30 TABLET, FILM COATED, EXTENDED RELEASE ORAL at 08:12

## 2024-12-03 RX ADMIN — ASPIRIN 81 MG: 81 TABLET, COATED ORAL at 08:12

## 2024-12-03 RX ADMIN — GABAPENTIN 600 MG: 300 CAPSULE ORAL at 08:12

## 2024-12-03 RX ADMIN — MAGNESIUM SULFATE HEPTAHYDRATE 2 G: 40 INJECTION, SOLUTION INTRAVENOUS at 10:12

## 2024-12-03 RX ADMIN — CARVEDILOL 12.5 MG: 12.5 TABLET, FILM COATED ORAL at 08:12

## 2024-12-03 NOTE — PROGRESS NOTES
[facility] Medicine Progress Note        Chief Complaint: Inpatient Follow-up    HPI:   89 y.o. female who Has a past medical history of HTN, HLD, JAYLA, T2DM, CKD 2, BPAD, and combined CHF EF 20-25% known to [facility]/Dr. Macedo who presented to [facility] on 12/2/2024 with a primary complaint of sudden onset of shortness of breath last night while sleeping.  She states she is compliant with her cardiac medications, but not the cardiac diet and admits to eating salty foods over the Thanksgiving holiday, especially her fried bologna sandwich last night.  She states she has had this happen in the past before and it was from fluid in her lungs.   She explained she is feeling much better since receiving IV Lasix and supplemental oxygen in therapies.  Initially, she presented to the ED hypoxic SpO2 87% on 4 L NC and was placed on BiPAP with significant improvement to oxygenation status SpO2 96-98% and BPwas markedly elevated at 239/153 and has improved to 173/82.  She admits her shortness a breath his improving and she does have some trace BLE pretibial edema.  She denies any chest pain, palpitations, or fever.  She admits to nonproductive cough and denies any sick contacts.  She denies any chest pain, abdominal pain, nausea, vomiting, diarrhea, dysuria, headaches, visual disturbances, unilateral weakness, syncope, falls, any injuries.     Chart review:  Vital signs revealed the patient is afebrile, /82, HR 70, RR 19, SpO2 96% on 2 L NC at the time of my evaluation on the medical floor.  Labs unremarkable CBC, unremarkable PT/INR, chemistry with elevated creatinine of 1.68 and BUN of 21, elevated potassium of 5.5, markedly elevated BNP of 1190 23, normal troponin, hemoglobin A1c was 5.6, negative viral swabs and unremarkable ABG.  CXR showed increased cardiac silhouette with increased interstitial markings bilaterally concerning for pulmonary edema.  EKG shows sinus tachycardia 129  beats per minute with no STEMI criteria.  Patient was treated in the ED with IV Lasix and nitroglycerin paste.  Patient admitted for acute on chronic CHF exacerbation    [facility] consulted    Interval Hx:   No acute events reported overnight.  She was seen at bedside this morning she was sitting up in the chair she reported she feels improving compared to yesterday lower extremity edema improving, denied CP, SOB    Hemodynamically stable , blood pressure good control  Labs this morning showed hemoglobin 11.4 potassium 3.2, Mag 1.5, BUN 23.3, creatinine 1.26    Case was discussed with patient's nurse     Objective/physical exam:  General: In no acute distress, afebrile  Chest:  Crackles at bases  Heart:+S1, S2, no appreciable murmur  Abdomen: Soft, nontender  MSK: Warm, bilateral lower extremity edema  Neurologic: Alert and oriented   VITAL SIGNS: 24 HRS MIN & MAX LAST   Temp  Min: 97.2 °F (36.2 °C)  Max: 98.4 °F (36.9 °C) 97.2 °F (36.2 °C)   BP  Min: 102/67  Max: 196/89 130/72   Pulse  Min: 62  Max: 82  62   Resp  Min: 18  Max: 20 20   SpO2  Min: 93 %  Max: 99 % 95 %     I have reviewed the following labs:  Recent Labs   Lab 12/02/24  0525 12/03/24  0348   WBC 11.45 5.37   RBC 4.78 4.20   HGB 12.8 11.4*   HCT 42.0 36.2*   MCV 87.9 86.2   MCH 26.8* 27.1   MCHC 30.5* 31.5*   RDW 17.2* 16.9    185   MPV 11.2* 10.6*     Recent Labs   Lab 12/02/24  0525 12/02/24  0550 12/02/24  1243 12/03/24  0348     --  143 145   K 5.5*  --  3.9 3.2*     --  104 104   CO2 23  --  29 29   BUN 21.2*  --  23.2* 23.3*   CREATININE 1.68*  --  1.42* 1.26*   CALCIUM 8.5  --  8.6 8.2*   PH  --  7.360  --   --    MG 1.70  --   --  1.50*   ALBUMIN 3.6  --   --  2.8*   ALKPHOS 86  --   --  63   ALT 24  --   --  15   AST 43*  --   --  16   BILITOT 1.0  --   --  0.9     Microbiology Results (last 7 days)       ** No results found for the last 168 hours. **             See below for Radiology    Assessment/Plan:  Acute on chronic heart  failure with combined systolic and diastolic dysfunction  Acute bilateral pulmonary edema with hypoxia, improving   Hypertensive emergency-improved  Hypokalemia, hypomagnesemia  CKD     Monitor on tele   Continue IV diuresis, strict Is&Os, daily weights, monitor renal function , O2 needs  Replete potassium, magnesium  Echo noted  Cardiology following, input noted   Monitor blood pressure, continue to titrate antihypertensives p.r.n.  Low-sodium diet  Mobilize/Pt   Labs in a.m.    VTE prophylaxis:  Heparin subQ    Patient condition:  Fair    Anticipated discharge and Disposition:   TBD      Critical care time spent: 35 minutes   Critical care diagnosis: Decompensated heart failure needing IV diuresis    Portions of this note dictated using EMR integrated voice recognition software, and may be subject to voice recognition errors not corrected at proofreading. Please contact writer for clarification if needed.   _____________________________________________________________________    Malnutrition Status:  Nutrition consulted. Most recent weight and BMI monitored-     Measurements:  Wt Readings from Last 1 Encounters:   12/03/24 93.2 kg (205 lb 7.5 oz)   Body mass index is 34.19 kg/m².    Patient has been screened and assessed by RD.    Malnutrition Type:  Context:    Level:      Malnutrition Characteristic Summary:       Interventions/Recommendations (treatment strategy):        Scheduled Med:   aspirin  81 mg Oral Daily    atorvastatin  40 mg Oral Daily PM    carvediloL  12.5 mg Oral BID WM    clopidogreL  75 mg Oral Daily    furosemide (LASIX) injection  40 mg Intravenous Q12H    gabapentin  600 mg Oral TID    NIFEdipine  30 mg Oral Daily    pantoprazole  40 mg Oral Daily      Continuous Infusions:     PRN Meds:    Current Facility-Administered Medications:     acetaminophen, 650 mg, Oral, Q4H PRN    acetaminophen, 650 mg, Oral, Q8H PRN    dextrose 10%, 12.5 g, Intravenous, PRN    dextrose 10%, 25 g, Intravenous,  PRN    dextrose 10%, 25 g, Intravenous, PRN    glucagon (human recombinant), 1 mg, Intramuscular, PRN    glucagon (human recombinant), 1 mg, Intramuscular, PRN    glucose, 16 g, Oral, PRN    glucose, 24 g, Oral, PRN    hydrALAZINE, 10 mg, Intravenous, Q4H PRN    influenza (adjuvanted), 0.5 mL, Intramuscular, Prior to discharge    insulin aspart U-100, 0-10 Units, Subcutaneous, Q6H PRN    melatonin, 6 mg, Oral, Nightly PRN    naloxone, 0.02 mg, Intravenous, PRN    ondansetron, 4 mg, Intravenous, Q8H PRN    sodium chloride 0.9%, 10 mL, Intravenous, Q12H PRN     Radiology:  I have personally reviewed the following imaging and agree with the radiologist.     X-Ray Chest AP Portable  Narrative: EXAMINATION:  XR CHEST AP PORTABLE    CLINICAL HISTORY:  Shortness of breath    TECHNIQUE:  Single view of the chest    COMPARISON:  07/14/2022    FINDINGS:  Cardiomegaly is with prominent interstitial markings greatest in the bilateral lower lobes.  Recommend further evaluation with two views of the chest.  Volume overload not excluded.  Impression: As above.    Electronically signed by: Shreyas Santos  Date:    12/02/2024  Time:    06:12      Rylie Nam MD  Department of Hospital Medicine   [facility]   12/03/2024

## 2024-12-03 NOTE — CONSULTS
Inpatient Nutrition Assessment    Admit Date: 12/2/2024   Total duration of encounter: 1 day   Patient Age: 89 y.o.    Nutrition Recommendation/Prescription     Diet Low Sodium, 2gm ordered  Boost Glucose Control BID (provides 190 kcal and 16 g protein per container)  Encouraged adequate PO intake  Monitor appetite/PO intake, weight, and labs    Communication of Recommendations: reviewed with patient    Nutrition Assessment     Malnutrition Assessment/Nutrition-Focused Physical Exam       Malnutrition Level: other (see comments) (Does not meet criteria) (12/03/24 1247)  Energy Intake (Malnutrition): less than 75% for greater than 7 days (12/03/24 1247)  Weight Loss (Malnutrition): other (see comments) (Does not meet criteria) (12/03/24 1247)                                         Fluid Accumulation (Malnutrition): other (see comments) (Unable to assess) (12/03/24 1247)        A minimum of two characteristics is recommended for diagnosis of either severe or non-severe malnutrition.    Chart Review    Reason Seen: continuous nutrition monitoring and physician consult for unintentional wt loss    Malnutrition Screening Tool Results   Have you recently lost weight without trying?: Yes: 2-13 lbs  Have you been eating poorly because of a decreased appetite?: No   MST Score: 1   Diagnosis:  Acute on chronic CHF exacerbation EF 20-25%   Acute bilateral pulmonary edema with hypoxemia, improving   Hypertensive emergency   Hyperkalemia, mild   Coronary artery disease   Carotid artery stenosis   BPAD   Type 2 diabetes mellitus-hemoglobin A1c 5.6 on 12/02/2024  Chronic kidney disease stage 3  Essential hypertension   Dyslipidemia   GERD  Former smoker    Relevant Medical History:    Atherosclerosis of both carotid arteries      CKD (chronic kidney disease) stage 2, GFR 60-89 ml/min      Diabetes mellitus      Hyperlipidemia      Hypertension      PAD (peripheral artery disease)        Scheduled Medications:  aspirin, 81 mg,  Daily  atorvastatin, 40 mg, Daily PM  carvediloL, 12.5 mg, BID WM  clopidogreL, 75 mg, Daily  furosemide (LASIX) injection, 40 mg, Q12H  gabapentin, 600 mg, TID  magnesium sulfate IVPB, 2 g, Once  NIFEdipine, 30 mg, Daily  pantoprazole, 40 mg, Daily  potassium chloride, 40 mEq, Daily    Continuous Infusions:   PRN Medications:  acetaminophen, 650 mg, Q4H PRN  acetaminophen, 650 mg, Q8H PRN  dextrose 10%, 12.5 g, PRN  dextrose 10%, 25 g, PRN  dextrose 10%, 25 g, PRN  glucagon (human recombinant), 1 mg, PRN  glucagon (human recombinant), 1 mg, PRN  glucose, 16 g, PRN  glucose, 24 g, PRN  hydrALAZINE, 10 mg, Q4H PRN  influenza (adjuvanted), 0.5 mL, Prior to discharge  insulin aspart U-100, 0-10 Units, Q6H PRN  melatonin, 6 mg, Nightly PRN  naloxone, 0.02 mg, PRN  ondansetron, 4 mg, Q8H PRN  sodium chloride 0.9%, 10 mL, Q12H PRN    Calorie Containing IV Medications: no significant kcals from medications at this time    Recent Labs   Lab 12/02/24  0525 12/02/24  1243 12/03/24  0348    143 145   K 5.5* 3.9 3.2*   CALCIUM 8.5 8.6 8.2*   MG 1.70  --  1.50*    104 104   CO2 23 29 29   BUN 21.2* 23.2* 23.3*   CREATININE 1.68* 1.42* 1.26*   EGFRNORACEVR 29 36 41   GLUCOSE 314* 183* 120*   BILITOT 1.0  --  0.9   ALKPHOS 86  --  63   ALT 24  --  15   AST 43*  --  16   ALBUMIN 3.6  --  2.8*   HGBA1C 5.6  --   --    WBC 11.45  --  5.37   HGB 12.8  --  11.4*   HCT 42.0  --  36.2*     Nutrition Orders:  Diet Low Sodium, 2gm      Appetite/Oral Intake: fair/50-75% of meals  Factors Affecting Nutritional Intake: decreased appetite  Social Needs Impacting Access to Food: none identified  Food/Yarsanism/Cultural Preferences: none reported  Food Allergies: none reported  Last Bowel Movement: 12/03/24  Wound(s):  none noted    Comments    12/3/2024: Pt reports a poor appetite/PO intake >3 weeks prior to admit. Pt reports an appetite/PO intake improvement. Pt denies N/V/D/C and chewing/swallowing difficulties. Pt agreeable to  "vanilla ONS. Pt reports a UBW as 68.1 kg 2 years ago. Last BM noted. Encouraged adequate PO intake. Will monitor.     Anthropometrics    Height: 5' 5" (165.1 cm), Height Method: Stated  Last Weight: 93.2 kg (205 lb 7.5 oz) (24 0700), Weight Method: Standard Scale  BMI (Calculated): 34.2  BMI Classification: obese grade I (BMI 30-34.9)     Ideal Body Weight (IBW), Female: 125 lb     % Ideal Body Weight, Female (lb): 188.8 %                    Usual Body Weight (UBW), k.1 kg  % Usual Body Weight: 137.14     Usual Weight Provided By: patient    Wt Readings from Last 5 Encounters:   24 93.2 kg (205 lb 7.5 oz)   04/10/24 97.8 kg (215 lb 9.8 oz)   22 99.2 kg (218 lb 11.1 oz)     Weight Change(s) Since Admission:   12/3/2024: 93.2 kg  Wt Readings from Last 1 Encounters:   24 0700 93.2 kg (205 lb 7.5 oz)   24 0512 107 kg (236 lb)   Admit Weight: 107 kg (236 lb) (24 0512), Weight Method: Stated    Estimated Needs    Weight Used For Calorie Calculations: 93.2 kg (205 lb 7.5 oz)  Energy Calorie Requirements (kcal): 1343-4927 (20-25 kcal/kg)  Energy Need Method: Kcal/kg  Weight Used For Protein Calculations: 93.2 kg (205 lb 7.5 oz)  Protein Requirements:  (1.0-1.2 g/kg)  Fluid Requirements (mL): 2000 (CHF)  CHO Requirement: 209-256 g/day (~45-55% est min kcal needs)     Enteral Nutrition     Patient not receiving enteral nutrition at this time.    Parenteral Nutrition     Patient not receiving parenteral nutrition support at this time.    Evaluation of Received Nutrient Intake    Calories: not meeting estimated needs  Protein: not meeting estimated needs    Patient Education     Not applicable.    Nutrition Diagnosis     PES: Inadequate oral intake related to acute illness as evidenced by poor PO intake >3 weeks prior to admit and since admit. (new)     PES:            Nutrition Interventions     Intervention(s): general/healthful diet and commercial beverage  Intervention(s):  "     Goal: Meet greater than 80% of nutritional needs by follow-up. (new)  Goal: Consume % of oral supplements by follow-up. (new)    Nutrition Goals & Monitoring     Dietitian will monitor: food and beverage intake, weight, electrolyte/renal panel, glucose/endocrine profile, and gastrointestinal profile  Discharge planning: continue Low sodium diet  Nutrition Risk/Follow-Up: moderate (follow-up in 3-5 days)   Please consult if re-assessment needed sooner.

## 2024-12-03 NOTE — PROGRESS NOTES
"  [facility] *    Cardiology  Progress Note    Patient Name: Porsha Horan  MRN: [id number]  Admission Date: 12/2/2024  Hospital Length of Stay: 1 days  Code Status: Full Code   Attending Provider: Rylie Nam MD   Consulting Provider: CHARITO Rico  Primary Care Physician: Jayy Allen MD  Principal Problem:<principal problem not specified>    Patient information was obtained from patient, past medical records, ER records, and primary team.     Subjective:   Chief Complaint/Reason for Consult: Heart Failure    HPI:   Ms. Horan is a 89 year old female, known to Dr. Davies, who presented to the hospital with shortness of breath. Reported recent salty food intake related to Thanksgiving Holiday. Upon arrival, patient noted to be significantly hypertensive. Also hypoxic requiring NC oxygen support. BNP noted to be  1190 23, normal troponin, hemoglobin A1c was 5.6, negative viral swabs. CXR showed increased cardiac silhouette with increased interstitial markings bilaterally concerning for pulmonary edema. EKG showed sinus tachycardia 129 bpm. Patient was treated in the ED with IV Lasix and nitroglycerin paste. CIS consulted for cardiac management.    12.3.24: NAD. Reports improvement in SOB. Denies CP or palps. Voided 1050 mL/24 hours. BP stable. On RA. "I am feeling better."     PMH: CAD/RCA Stenting, PAD BLE, HTN, JAYLA, HLD, T2DM with CKD 2  PSH: Peripheral angiogram; partial hysterectomy  Family History: Father- HTN, Cancer-reason for death.  Mother HTN, Cancer- reason for death  Social History: Tobacco- Former Smoker, Alcohol- Negative, Substance Abuse- Negative     Previous Cardiac Diagnostics:   Echocardiogram (12.2.24):  Left Ventricle: The left ventricle is normal in size. Normal wall thickness. There is concentric remodeling. Mild global hypokinesis present. There is mildly reduced systolic function with a visually estimated ejection fraction of 45 - 50%. Grade III " diastolic dysfunction. Elevated left ventricular filling pressure.  Right Ventricle: Normal right ventricular cavity size. Systolic function is normal.  Aortic Valve: The aortic valve is a trileaflet valve. There is no stenosis. There is mild aortic regurgitation.  Mitral Valve: Mildly thickened leaflets. There is mild regurgitation.  Tricuspid Valve: The tricuspid valve is structurally normal. There is mild regurgitation.  Pulmonary Artery: The estimated pulmonary artery systolic pressure is 39 mmHg.  IVC/SVC: Intermediate venous pressure at 8 mmHg.  Pericardium: There is no pericardial effusion.    Echocardiogram (6.11.24):  The study quality is average.   Limited echo to assess left ventricular function.  Global left ventricular systolic function is normal. The left ventricular ejection fraction is 55%.     Coronary Angiogram with PCI (4.10.24):  Findings   Left main normal   Lad widely patent   Left circumflex normal   RCA reveals the 80% calcific stenosis   Summary  Patient underwent successful balloon angioplasty stenting of the proximal RCA with shockwave lithotripsy from right groin approach.    PET (3.22.24):  This is an abnormal perfusion study. Study is consistent with ischemia.   This scan is suggestive of moderate risk for future cardiovascular events.   Large partially reversible perfusion abnormality of severe intensity in the lateral region.   Perfusion imaging is suggestive of single vessel disease. Perfusion defect is in the distribution of left circumflex artery.   The left ventricular cavity is noted to be normal on the stress studies. The stress left ventricular ejection fraction was calculated to be 47% and left ventricular global function is mildly reduced. The rest left ventricular cavity is noted to be normal. The rest left ventricular ejection fraction was calculated to be 50% and rest left ventricular global function is normal.   Persistent hypokinesis of the anterior region, septal  region, inferior region and lateral region is noted in both rest and stress studies. When compared to the resting ejection fraction (50%), the stress ejection fraction (47%) has decreased.   The study quality is good.   Myocardial blood flow reserve was not performed in this patient due to specific concerns that can affect accuracy.    CUS (12.6.22):  The study quality is average.   40-59% stenosis in the mid right internal carotid artery based on Bluth Criteria.   40-59% stenosis in the mid left internal carotid artery based on Bluth Criteria.   The right vertebral artery is poorly visualized.   Antegrade left vertebral artery flow.     Peripheral angiogram 2021:  100% occluded right SFA  50% Left SFA  S/p PTA/stent R SFA and PTA of right AT artery  Left SFA stenosis to be managed medically    Review of patient's allergies indicates:   Allergen Reactions    Tirofiban      Other reaction(s): Thrombocytopenia     No current facility-administered medications on file prior to encounter.     Current Outpatient Medications on File Prior to Encounter   Medication Sig    aspirin (ECOTRIN) 81 MG EC tablet Take 81 mg by mouth once daily.    atorvastatin (LIPITOR) 40 MG tablet SMARTSI Tablet(s) By Mouth Every Evening    carvediloL (COREG) 12.5 MG tablet Take 12.5 mg by mouth 2 (two) times daily with meals.    clopidogreL (PLAVIX) 75 mg tablet Take 75 mg by mouth once daily.    furosemide (LASIX) 20 MG tablet Take 1 tablet (20 mg total) by mouth once daily.    gabapentin (NEURONTIN) 600 MG tablet Take 600 mg by mouth 3 (three) times daily.    pantoprazole (PROTONIX) 40 MG tablet Take 40 mg by mouth once daily.    sacubitriL-valsartan (ENTRESTO) 49-51 mg per tablet Take 1 tablet by mouth 2 (two) times daily.    traMADoL (ULTRAM) 50 mg tablet Take 50 mg by mouth every 6 (six) hours as needed for Pain.    nitroGLYCERIN (NITROSTAT) 0.4 MG SL tablet Place 1 tablet (0.4 mg total) under the tongue every 5 (five) minutes as  needed for Chest pain. (Patient not taking: Reported on 12/2/2024)    promethazine (PHENERGAN) 25 MG tablet Take 25 mg by mouth every 6 (six) hours as needed for Nausea.    [DISCONTINUED] hydroCHLOROthiazide (HYDRODIURIL) 25 MG tablet Take 25 mg by mouth every morning.     Review of Systems   Constitutional:  Negative for fatigue.   Respiratory:  Positive for shortness of breath (improving). Negative for chest tightness.    Cardiovascular:  Positive for leg swelling (improving). Negative for chest pain.   All other systems reviewed and are negative.    Objective:     Vital Signs (Most Recent):  Temp: 97.5 °F (36.4 °C) (12/03/24 1115)  Pulse: 70 (12/03/24 1115)  Resp: 20 (12/03/24 1115)  BP: 101/65 (12/03/24 1115)  SpO2: 97 % (12/03/24 1115) Vital Signs (24h Range):  Temp:  [97.2 °F (36.2 °C)-98.4 °F (36.9 °C)] 97.5 °F (36.4 °C)  Pulse:  [62-70] 70  Resp:  [18-20] 20  SpO2:  [93 %-99 %] 97 %  BP: (101-147)/(61-72) 101/65   Weight: 93.2 kg (205 lb 7.5 oz)  Body mass index is 34.19 kg/m².  SpO2: 97 %       Intake/Output Summary (Last 24 hours) at 12/3/2024 1300  Last data filed at 12/3/2024 1015  Gross per 24 hour   Intake --   Output 1250 ml   Net -1250 ml     Lines/Drains/Airways       Peripheral Intravenous Line  Duration                  Peripheral IV - Single Lumen 12/03/24 1120 20 G Posterior;Right Wrist <1 day                  Significant Labs:   Chemistries:   Recent Labs   Lab 12/02/24  0525 12/02/24  1243 12/03/24  0348    143 145   K 5.5* 3.9 3.2*    104 104   CO2 23 29 29   BUN 21.2* 23.2* 23.3*   CREATININE 1.68* 1.42* 1.26*   CALCIUM 8.5 8.6 8.2*   BILITOT 1.0  --  0.9   ALKPHOS 86  --  63   ALT 24  --  15   AST 43*  --  16   GLUCOSE 314* 183* 120*   MG 1.70  --  1.50*   TROPONINI <0.010  --   --         CBC/Anemia Labs: Coags:    Recent Labs   Lab 12/02/24  0525 12/03/24  0348   WBC 11.45 5.37   HGB 12.8 11.4*   HCT 42.0 36.2*    185   MCV 87.9 86.2   RDW 17.2* 16.9    Recent Labs    Lab 12/02/24  0525   INR 1.1        Significant Imaging:  Imaging Results              X-Ray Chest AP Portable (Final result)  Result time 12/02/24 06:12:32      Final result by Shreyas Santos MD (12/02/24 06:12:32)                   Impression:      As above.      Electronically signed by: Shreyas Santos  Date:    12/02/2024  Time:    06:12               Narrative:    EXAMINATION:  XR CHEST AP PORTABLE    CLINICAL HISTORY:  Shortness of breath    TECHNIQUE:  Single view of the chest    COMPARISON:  07/14/2022    FINDINGS:  Cardiomegaly is with prominent interstitial markings greatest in the bilateral lower lobes.  Recommend further evaluation with two views of the chest.  Volume overload not excluded.                                    EKG:       Telemetry:  Sinus Tachycardia    Physical Exam  Vitals and nursing note reviewed.   Constitutional:       Appearance: Normal appearance.   HENT:      Head: Normocephalic.      Mouth/Throat:      Mouth: Mucous membranes are moist.      Pharynx: Oropharynx is clear.   Cardiovascular:      Rate and Rhythm: Normal rate and regular rhythm.      Heart sounds: Normal heart sounds.   Pulmonary:      Effort: Pulmonary effort is normal. No respiratory distress.      Breath sounds: Rales present.      Comments: RA  Abdominal:      Palpations: Abdomen is soft.      Tenderness: There is no abdominal tenderness. There is no guarding.   Musculoskeletal:      Right lower leg: Edema (improving) present.      Left lower leg: Edema (improving) present.      Comments: BLE Warm   Skin:     General: Skin is warm and dry.   Neurological:      Mental Status: She is alert.   Psychiatric:         Behavior: Behavior normal.       Home Medications:   No current facility-administered medications on file prior to encounter.     Current Outpatient Medications on File Prior to Encounter   Medication Sig Dispense Refill    aspirin (ECOTRIN) 81 MG EC tablet Take 81 mg by mouth once daily.       atorvastatin (LIPITOR) 40 MG tablet SMARTSI Tablet(s) By Mouth Every Evening      carvediloL (COREG) 12.5 MG tablet Take 12.5 mg by mouth 2 (two) times daily with meals.      clopidogreL (PLAVIX) 75 mg tablet Take 75 mg by mouth once daily.      furosemide (LASIX) 20 MG tablet Take 1 tablet (20 mg total) by mouth once daily. 30 tablet 0    gabapentin (NEURONTIN) 600 MG tablet Take 600 mg by mouth 3 (three) times daily.      pantoprazole (PROTONIX) 40 MG tablet Take 40 mg by mouth once daily.      sacubitriL-valsartan (ENTRESTO) 49-51 mg per tablet Take 1 tablet by mouth 2 (two) times daily. 60 tablet 0    traMADoL (ULTRAM) 50 mg tablet Take 50 mg by mouth every 6 (six) hours as needed for Pain.      nitroGLYCERIN (NITROSTAT) 0.4 MG SL tablet Place 1 tablet (0.4 mg total) under the tongue every 5 (five) minutes as needed for Chest pain. (Patient not taking: Reported on 2024) 30 tablet 0    promethazine (PHENERGAN) 25 MG tablet Take 25 mg by mouth every 6 (six) hours as needed for Nausea.      [DISCONTINUED] hydroCHLOROthiazide (HYDRODIURIL) 25 MG tablet Take 25 mg by mouth every morning.       Current Schedule Inpatient Medications:   aspirin  81 mg Oral Daily    atorvastatin  40 mg Oral Daily PM    carvediloL  12.5 mg Oral BID WM    clopidogreL  75 mg Oral Daily    furosemide (LASIX) injection  40 mg Intravenous Q12H    gabapentin  600 mg Oral TID    NIFEdipine  30 mg Oral Daily    pantoprazole  40 mg Oral Daily    potassium chloride  40 mEq Oral Daily       Assessment:   Acute on Chronic Diastolic HF - Clinically Improving     - EF 45-50% (12.2.24)     - EF 55% (Echo 6.11.24)  Hypertensive Emergency - Resolved     - History of Hypertension  CAD    - Status Post RCA Stenting (2024)    - Troponin Normal  Hyperlipidemia  MARIO - Improving   DM II  Chronic Kidney Disease Stage III  PAD  Former Smoker  Hyperkalemia (Mild)  GERD    Plan:   Continue to diurese as clinically indicated. Ensure Accurate I/O  Entresto  on hold s/t hyperkalemia.  Continue ASA, statin, plavix, & BB.  Continue Procardia XL 30 mg Daily  Keep K > 4 & Mg > 2. Replete K & Mg.   Low Salt Diet  Routine Labs in AM        CHARITO Rico  Cardiology  [facility]

## 2024-12-03 NOTE — PT/OT/SLP PROGRESS
Physical Therapy Treatment    Patient Name:  Porsha Horan   MRN:  [id number]    Recommendations:     Discharge therapy intensity: Low Intensity Therapy   Discharge Equipment Recommendations: none  Barriers to discharge: Ongoing medical needs    Assessment:     Porsha Horan is a 89 y.o. female admitted with a medical diagnosis of SOB. Prior to admit, patient lived with daughter in a SLH with 2 steps (bilateral rails) to enter. At baseline, she is independent and ambulates with a SPC.  She presents with the following impairments/functional limitations: weakness, impaired endurance, impaired self care skills, impaired functional mobility, gait instability, impaired balance, decreased safety awareness.    Rehab Prognosis: Good; patient would benefit from acute skilled PT services to address these deficits and reach maximum level of function.    Recent Surgery: * No surgery found *      Plan:     During this hospitalization, patient would benefit from acute PT services 5 x/week to address the identified rehab impairments via gait training, therapeutic activities, therapeutic exercises, neuromuscular re-education and progress toward the following goals:    Plan of Care Expires:  01/02/25    Subjective     Chief Complaint: none stated  Patient/Family Comments/goals: to go home  Pain/Comfort:         Objective:     Communicated with nursing prior to session.  Patient found up in chair with peripheral IV, PureWick, telemetry, pulse ox (continuous) upon PT entry to room.     General Precautions: Standard, fall  Orthopedic Precautions: N/A  Braces: N/A  Respiratory Status: Room air, SpO2: 90-95%  Blood Pressure: NT    Functional Mobility:  Transfers:     Sit to Stand:  contact guard assistance with rolling walker and straight cane  Gait: 50'/70' w/RW, CGA  Slow manuel, step through gait pattern  1 standing rest break    Therapeutic Activities/Exercises:  Pt initually using SPC however pt unsteady requiring Megan, Pt  switched to RW, stability increased.    Education:  Patient provided with verbal education education regarding PT role/goals/POC, fall prevention, and safety awareness.  Understanding was verbalized.     Patient left up in chair with all lines intact, call button in reach, and nurse notified    GOALS:   Multidisciplinary Problems       Physical Therapy Goals          Problem: Physical Therapy    Goal Priority Disciplines Outcome Interventions   Physical Therapy Goal     PT, PT/OT Progressing    Description: Goals to be met by: 25     Patient will increase functional independence with mobility by performin. Supine to sit with [place]  2. Sit to supine with [place]  3. Sit to stand transfer with Modified [place]  4. Gait  x 300 feet with Modified [place] using Rolling Walker.   5. Ascend/descend 2 stairs with bilateral Handrails & Modified [place]                          Time Tracking:     PT Received On: 24  PT Start Time: 1147     PT Stop Time: 1206  PT Total Time (min): 19 min     Billable Minutes: Gait Training 19    Treatment Type: Treatment  PT/PTA: PTA     Number of PTA visits since last PT visit: 1     2024

## 2024-12-04 VITALS
SYSTOLIC BLOOD PRESSURE: 103 MMHG | DIASTOLIC BLOOD PRESSURE: 53 MMHG | BODY MASS INDEX: 34.24 KG/M2 | WEIGHT: 205.5 LBS | HEIGHT: 65 IN | HEART RATE: 60 BPM | RESPIRATION RATE: 16 BRPM | OXYGEN SATURATION: 95 % | TEMPERATURE: 99 F

## 2024-12-04 PROBLEM — I50.9 CHF EXACERBATION: Status: ACTIVE | Noted: 2024-12-04

## 2024-12-04 LAB
ANION GAP SERPL CALC-SCNC: 10 MEQ/L
BASOPHILS # BLD AUTO: 0.05 X10(3)/MCL
BASOPHILS NFR BLD AUTO: 1.1 %
BUN SERPL-MCNC: 29.7 MG/DL (ref 9.8–20.1)
CALCIUM SERPL-MCNC: 8.4 MG/DL (ref 8.4–10.2)
CHLORIDE SERPL-SCNC: 102 MMOL/L (ref 98–107)
CO2 SERPL-SCNC: 30 MMOL/L (ref 23–31)
CREAT SERPL-MCNC: 1.34 MG/DL (ref 0.55–1.02)
CREAT/UREA NIT SERPL: 22
EOSINOPHIL # BLD AUTO: 0.08 X10(3)/MCL (ref 0–0.9)
EOSINOPHIL NFR BLD AUTO: 1.7 %
ERYTHROCYTE [DISTWIDTH] IN BLOOD BY AUTOMATED COUNT: 16.9 % (ref 11.5–17)
GFR SERPLBLD CREATININE-BSD FMLA CKD-EPI: 38 ML/MIN/1.73/M2
GLUCOSE SERPL-MCNC: 120 MG/DL (ref 82–115)
HCT VFR BLD AUTO: 36.9 % (ref 37–47)
HGB BLD-MCNC: 11.3 G/DL (ref 12–16)
IMM GRANULOCYTES # BLD AUTO: 0.01 X10(3)/MCL (ref 0–0.04)
IMM GRANULOCYTES NFR BLD AUTO: 0.2 %
LYMPHOCYTES # BLD AUTO: 1.17 X10(3)/MCL (ref 0.6–4.6)
LYMPHOCYTES NFR BLD AUTO: 24.9 %
MAGNESIUM SERPL-MCNC: 2.3 MG/DL (ref 1.6–2.6)
MCH RBC QN AUTO: 26.6 PG (ref 27–31)
MCHC RBC AUTO-ENTMCNC: 30.6 G/DL (ref 33–36)
MCV RBC AUTO: 86.8 FL (ref 80–94)
MONOCYTES # BLD AUTO: 0.38 X10(3)/MCL (ref 0.1–1.3)
MONOCYTES NFR BLD AUTO: 8.1 %
NEUTROPHILS # BLD AUTO: 3 X10(3)/MCL (ref 2.1–9.2)
NEUTROPHILS NFR BLD AUTO: 64 %
NRBC BLD AUTO-RTO: 0 %
PLATELET # BLD AUTO: 183 X10(3)/MCL (ref 130–400)
PMV BLD AUTO: 10.3 FL (ref 7.4–10.4)
POTASSIUM SERPL-SCNC: 3.8 MMOL/L (ref 3.5–5.1)
RBC # BLD AUTO: 4.25 X10(6)/MCL (ref 4.2–5.4)
SODIUM SERPL-SCNC: 142 MMOL/L (ref 136–145)
WBC # BLD AUTO: 4.69 X10(3)/MCL (ref 4.5–11.5)

## 2024-12-04 PROCEDURE — 25000003 PHARM REV CODE 250: Performed by: INTERNAL MEDICINE

## 2024-12-04 PROCEDURE — 85025 COMPLETE CBC W/AUTO DIFF WBC: CPT | Performed by: INTERNAL MEDICINE

## 2024-12-04 PROCEDURE — 3E02340 INTRODUCTION OF INFLUENZA VACCINE INTO MUSCLE, PERCUTANEOUS APPROACH: ICD-10-PCS | Performed by: INTERNAL MEDICINE

## 2024-12-04 PROCEDURE — 94760 N-INVAS EAR/PLS OXIMETRY 1: CPT

## 2024-12-04 PROCEDURE — 99900035 HC TECH TIME PER 15 MIN (STAT)

## 2024-12-04 PROCEDURE — 25000003 PHARM REV CODE 250: Performed by: NURSE PRACTITIONER

## 2024-12-04 PROCEDURE — 27100171 HC OXYGEN HIGH FLOW UP TO 24 HOURS

## 2024-12-04 PROCEDURE — 63600175 PHARM REV CODE 636 W HCPCS: Performed by: INTERNAL MEDICINE

## 2024-12-04 PROCEDURE — 80048 BASIC METABOLIC PNL TOTAL CA: CPT | Performed by: INTERNAL MEDICINE

## 2024-12-04 PROCEDURE — 36415 COLL VENOUS BLD VENIPUNCTURE: CPT | Performed by: INTERNAL MEDICINE

## 2024-12-04 PROCEDURE — 63600175 PHARM REV CODE 636 W HCPCS

## 2024-12-04 PROCEDURE — 94660 CPAP INITIATION&MGMT: CPT

## 2024-12-04 PROCEDURE — G0008 ADMIN INFLUENZA VIRUS VAC: HCPCS | Performed by: INTERNAL MEDICINE

## 2024-12-04 PROCEDURE — 83735 ASSAY OF MAGNESIUM: CPT | Performed by: INTERNAL MEDICINE

## 2024-12-04 PROCEDURE — 90471 IMMUNIZATION ADMIN: CPT | Performed by: INTERNAL MEDICINE

## 2024-12-04 PROCEDURE — 25000003 PHARM REV CODE 250

## 2024-12-04 PROCEDURE — 99900031 HC PATIENT EDUCATION (STAT)

## 2024-12-04 PROCEDURE — 90653 IIV ADJUVANT VACCINE IM: CPT | Performed by: INTERNAL MEDICINE

## 2024-12-04 RX ORDER — FUROSEMIDE 40 MG/1
40 TABLET ORAL DAILY
Qty: 30 TABLET | Refills: 0 | Status: SHIPPED | OUTPATIENT
Start: 2024-12-04

## 2024-12-04 RX ORDER — POTASSIUM CHLORIDE 1500 MG/1
20 TABLET, EXTENDED RELEASE ORAL DAILY
Qty: 30 TABLET | Refills: 0 | Status: SHIPPED | OUTPATIENT
Start: 2024-12-05

## 2024-12-04 RX ADMIN — CLOPIDOGREL BISULFATE 75 MG: 75 TABLET ORAL at 08:12

## 2024-12-04 RX ADMIN — INFLUENZA A VIRUS A/VICTORIA/4897/2022 IVR-238 (H1N1) ANTIGEN (FORMALDEHYDE INACTIVATED), INFLUENZA A VIRUS A/THAILAND/8/2022 IVR-237 (H3N2) ANTIGEN (FORMALDEHYDE INACTIVATED), INFLUENZA B VIRUS B/AUSTRIA/1359417/2021 BVR-26 ANTIGEN (FORMALDEHYDE INACTIVATED) 0.5 ML: 15; 15; 15 INJECTION, SUSPENSION INTRAMUSCULAR at 10:12

## 2024-12-04 RX ADMIN — ASPIRIN 81 MG: 81 TABLET, COATED ORAL at 08:12

## 2024-12-04 RX ADMIN — NIFEDIPINE 30 MG: 30 TABLET, FILM COATED, EXTENDED RELEASE ORAL at 08:12

## 2024-12-04 RX ADMIN — POTASSIUM CHLORIDE 40 MEQ: 1500 TABLET, EXTENDED RELEASE ORAL at 08:12

## 2024-12-04 RX ADMIN — PANTOPRAZOLE SODIUM 40 MG: 40 TABLET, DELAYED RELEASE ORAL at 08:12

## 2024-12-04 RX ADMIN — CARVEDILOL 12.5 MG: 12.5 TABLET, FILM COATED ORAL at 08:12

## 2024-12-04 RX ADMIN — GABAPENTIN 600 MG: 300 CAPSULE ORAL at 08:12

## 2024-12-04 RX ADMIN — INSULIN ASPART 2 UNITS: 100 INJECTION, SOLUTION INTRAVENOUS; SUBCUTANEOUS at 11:12

## 2024-12-04 RX ADMIN — FUROSEMIDE 40 MG: 10 INJECTION, SOLUTION INTRAMUSCULAR; INTRAVENOUS at 08:12

## 2024-12-04 NOTE — PLAN OF CARE
12/04/24 1556   Final Note   Assessment Type Final Discharge Note   Anticipated Discharge Disposition Home-Health   Hospital Resources/Appts/Education Provided Appointments scheduled and added to AVS   Post-Acute Status   Post-Acute Authorization Home Health   Home Health Status Set-up Complete/Auth obtained   Discharge Delays None known at this time

## 2024-12-04 NOTE — PHYSICIAN QUERY
Please clarify the conflicting documentation in regards to the TYPE of heart failure.     Acute on chronic combined systolic and diastolic heart failure

## 2024-12-04 NOTE — DISCHARGE SUMMARY
[facility] Hospital Medicine Discharge Summary    Admit Date: 12/2/2024  Discharge Date and Time: 12/4/20241:51 PM  Admitting Physician:  Team  Discharging Physician: Rylie Nam MD.  Primary Care Physician: Jayy Allen MD  Consults: Cardiology    Discharge Diagnoses:  Acute on chronic heart failure with combined systolic and diastolic dysfunction  Acute bilateral pulmonary edema with hypoxia  Hypertensive emergency on admit-improved  CKD     Hospital Course:   89 y.o. female who Has a past medical history of HTN, HLD, JAYLA, T2DM, CKD 2, BPAD, and combined CHF EF 20-25% known to [facility]/Dr. Macedo who presented to [facility] on 12/2/2024 with a primary complaint of sudden onset of shortness of breath last night while sleeping.  She states she is compliant with her cardiac medications, but not the cardiac diet and admits to eating salty foods over the Thanksgiving holiday, especially her fried bologna sandwich last night.  She states she has had this happen in the past before and it was from fluid in her lungs.   She explained she is feeling much better since receiving IV Lasix and supplemental oxygen in therapies.  Initially, she presented to the ED hypoxic SpO2 87% on 4 L NC and was placed on BiPAP with significant improvement to oxygenation status SpO2 96-98% and BPwas markedly elevated at 239/153 and has improved to 173/82.  She admits her shortness a breath his improving and she does have some trace BLE pretibial edema.  She denies any chest pain, palpitations, or fever.  She admits to nonproductive cough and denies any sick contacts.  She denies any chest pain, abdominal pain, nausea, vomiting, diarrhea, dysuria, headaches, visual disturbances, unilateral weakness, syncope, falls, any injuries.     Chart review:  Vital signs revealed the patient is afebrile, /82, HR 70, RR 19, SpO2 96% on 2 L NC at the time of my evaluation on the medical floor.  Labs unremarkable CBC,  unremarkable PT/INR, chemistry with elevated creatinine of 1.68 and BUN of 21, elevated potassium of 5.5, markedly elevated BNP of 1190 23, normal troponin, hemoglobin A1c was 5.6, negative viral swabs and unremarkable ABG.  CXR showed increased cardiac silhouette with increased interstitial markings bilaterally concerning for pulmonary edema.  EKG shows sinus tachycardia 129 beats per minute with no STEMI criteria.  Patient was treated in the ED with IV Lasix and nitroglycerin paste.  Patient admitted for acute on chronic CHF exacerbation    Cardiology team evaluated the pt, diuresed , electrolytes, renal function ,bp monitred, titrated meds.Pt was diuresed well, improved symptoms and volume status.    Pt was seen and examined on the day of discharge, stable, cis cleared the pt recommening close f/u  to monitor cmp, adjust meds . Discussed follow ups with pt, in agreement with dc plan, CM consulted for dc needs, pt discharged to home with ,prescriptions sent.      Vitals:  VITAL SIGNS: 24 HRS MIN & MAX LAST   Temp  Min: 97.8 °F (36.6 °C)  Max: 99 °F (37.2 °C) 99 °F (37.2 °C)   BP  Min: 93/48  Max: 111/63 (!) 103/53   Pulse  Min: 55  Max: 79  60   Resp  Min: 16  Max: 26 16   SpO2  Min: 80 %  Max: 100 % 95 %       Physical Exam:  General: In no acute distress, afebrile  Chest:  unlabored breathing on RA   Heart:reg,+S1, S2  Abdomen: Soft, nontender  MSK: Warm, bilateral lower extremity edema improved   Neurologic: Alert and oriented     Procedures Performed: No admission procedures for hospital encounter.     Significant Diagnostic Studies: See Full reports for all details    Recent Labs   Lab 12/02/24  0525 12/03/24  0348 12/04/24  0356   WBC 11.45 5.37 4.69   RBC 4.78 4.20 4.25   HGB 12.8 11.4* 11.3*   HCT 42.0 36.2* 36.9*   MCV 87.9 86.2 86.8   MCH 26.8* 27.1 26.6*   MCHC 30.5* 31.5* 30.6*   RDW 17.2* 16.9 16.9    185 183   MPV 11.2* 10.6* 10.3       Recent Labs   Lab 12/02/24  0525 12/02/24  0550  12/02/24  1243 12/03/24  0348 12/04/24  0356     --  143 145 142   K 5.5*  --  3.9 3.2* 3.8     --  104 104 102   CO2 23  --  29 29 30   BUN 21.2*  --  23.2* 23.3* 29.7*   CREATININE 1.68*  --  1.42* 1.26* 1.34*   CALCIUM 8.5  --  8.6 8.2* 8.4   PH  --  7.360  --   --   --    MG 1.70  --   --  1.50* 2.30   ALBUMIN 3.6  --   --  2.8*  --    ALKPHOS 86  --   --  63  --    ALT 24  --   --  15  --    AST 43*  --   --  16  --    BILITOT 1.0  --   --  0.9  --         Microbiology Results (last 7 days)       ** No results found for the last 168 hours. **             Echo    Left Ventricle: The left ventricle is normal in size. Normal wall   thickness. There is concentric remodeling. Mild global hypokinesis   present. There is mildly reduced systolic function with a visually   estimated ejection fraction of 45 - 50%. Grade III diastolic dysfunction.   Elevated left ventricular filling pressure.    Right Ventricle: Normal right ventricular cavity size. Systolic   function is normal.    Aortic Valve: The aortic valve is a trileaflet valve. There is no   stenosis. There is mild aortic regurgitation.    Mitral Valve: Mildly thickened leaflets. There is mild regurgitation.    Tricuspid Valve: The tricuspid valve is structurally normal. There is   mild regurgitation.    Pulmonary Artery: The estimated pulmonary artery systolic pressure is   39 mmHg.    IVC/SVC: Intermediate venous pressure at 8 mmHg.    Pericardium: There is no pericardial effusion.         Medication List        START taking these medications      potassium chloride 20 mEq  Commonly known as: K-TAB  Take 1 tablet (20 mEq total) by mouth once daily.  Start taking on: December 5, 2024            CHANGE how you take these medications      furosemide 40 MG tablet  Commonly known as: LASIX  Take 1 tablet (40 mg total) by mouth once daily.  What changed:   medication strength  how much to take            CONTINUE taking these medications      aspirin 81 MG  EC tablet  Commonly known as: ECOTRIN     atorvastatin 40 MG tablet  Commonly known as: LIPITOR     carvediloL 12.5 MG tablet  Commonly known as: COREG     clopidogreL 75 mg tablet  Commonly known as: PLAVIX     gabapentin 600 MG tablet  Commonly known as: NEURONTIN     nitroGLYCERIN 0.4 MG SL tablet  Commonly known as: NITROSTAT  Place 1 tablet (0.4 mg total) under the tongue every 5 (five) minutes as needed for Chest pain.     pantoprazole 40 MG tablet  Commonly known as: PROTONIX     promethazine 25 MG tablet  Commonly known as: PHENERGAN     traMADoL 50 mg tablet  Commonly known as: ULTRAM            STOP taking these medications      sacubitriL-valsartan 49-51 mg per tablet  Commonly known as: ENTRESTO               Where to Get Your Medications        These medications were sent to [facility] - [address]  [address] [facility] [address] [address]      Phone: [phone]   furosemide 40 MG tablet  potassium chloride 20 mEq          Explained in detail to the patient about the discharge plan, medications, and follow-up visits. Pt understands and agrees with the treatment plan  Discharge Disposition: Home w    Discharged Condition: stable  Diet-   Dietary Orders (From admission, onward)       Start     Ordered    12/03/24 1308  Dietary nutrition supplements BID; Boost Glucose Control - Vanilla  Continuous        Question Answer Comment   Frequency: BID    Select PO Supplement: Boost Glucose Control - Vanilla        12/03/24 1307    12/02/24 0919  Diet Low Sodium, 2gm  (Diet/Nutrition - [facility])  Diet effective now         12/02/24 0919                   Medications Per DC med rec  Activities as tolerated   Contact information for follow-up providers       Jayy Allen MD. Go on 12/11/2024.    Specialty: Family Medicine  Why: @ 10:10 AM  Contact information:  [place] [address]  [phone]                Basil Davies MD Follow up.    Specialty: Cardiology  Why: CMP in 3-5 days  Hospital f/u in 1 week  Contact information:  [facility] [address] [facility] [address]  [phone]                       Contact information for after-discharge care       Home Medical Care       NURSING SPECIALTIES .    Service: Home Health Services  Contact information:  [address] [facility] [address]  [phone]                                 For further questions contact hospitalist office    Discharge time 33 minutes    For worsening symptoms, chest pain, shortness of breath, increased abdominal pain, high grade fever, stroke or stroke like symptoms, immediately go to the nearest Emergency Room or call 911 as soon as possible.      Rylie Waters M.D, on 12/4/2024. at 1:51 PM.

## 2024-12-04 NOTE — PROGRESS NOTES
VSS no complaints d/c instructions reviewed. All questions answered. Iv and tele dc'ed. Walker delivered. Pt home with HH

## 2024-12-04 NOTE — NURSING
Pt fell asleep and O2 saturation dropped to 80% on room air. Woke pt up, placed Bipap, oxygen saturation stable @ 100%.

## 2024-12-04 NOTE — PROGRESS NOTES
"  [facility] *    Cardiology  Progress Note    Patient Name: Porsha Horan  MRN: [id number]  Admission Date: 12/2/2024  Hospital Length of Stay: 2 days  Code Status: Full Code   Attending Provider: Rylie Nam MD   Consulting Provider: CHARITO Rico  Primary Care Physician: Jayy Allen MD  Principal Problem:CHF exacerbation    Patient information was obtained from patient, past medical records, ER records, and primary team.     Subjective:   Chief Complaint/Reason for Consult: Heart Failure    HPI:   Ms. Horan is a 89 year old female, known to Dr. Davies, who presented to the hospital with shortness of breath. Reported recent salty food intake related to Thanksgiving Holiday. Upon arrival, patient noted to be significantly hypertensive. Also hypoxic requiring NC oxygen support. BNP noted to be  1190 23, normal troponin, hemoglobin A1c was 5.6, negative viral swabs. CXR showed increased cardiac silhouette with increased interstitial markings bilaterally concerning for pulmonary edema. EKG showed sinus tachycardia 129 bpm. Patient was treated in the ED with IV Lasix and nitroglycerin paste. [facility] consulted for cardiac management.    12.3.24: NAD. Reports improvement in SOB. Denies CP or palps. Voided 1050 mL/24 hours. BP stable. On RA. "I am feeling better."   12.4.24: NAD. Denies CP, SOB, or palps. Daily net negative 1140 mL/24 hours. BP soft but stable. On RA. "I feel good."    PMH: CAD/RCA Stenting, PAD BLE, HTN, JAYLA, HLD, T2DM with CKD 2  PSH: Peripheral angiogram; partial hysterectomy  Family History: Father- HTN, Cancer-reason for death.  Mother HTN, Cancer- reason for death  Social History: Tobacco- Former Smoker, Alcohol- Negative, Substance Abuse- Negative     Previous Cardiac Diagnostics:   Echocardiogram (12.2.24):  Left Ventricle: The left ventricle is normal in size. Normal wall thickness. There is concentric remodeling. Mild global hypokinesis present. There is " mildly reduced systolic function with a visually estimated ejection fraction of 45 - 50%. Grade III diastolic dysfunction. Elevated left ventricular filling pressure.  Right Ventricle: Normal right ventricular cavity size. Systolic function is normal.  Aortic Valve: The aortic valve is a trileaflet valve. There is no stenosis. There is mild aortic regurgitation.  Mitral Valve: Mildly thickened leaflets. There is mild regurgitation.  Tricuspid Valve: The tricuspid valve is structurally normal. There is mild regurgitation.  Pulmonary Artery: The estimated pulmonary artery systolic pressure is 39 mmHg.  IVC/SVC: Intermediate venous pressure at 8 mmHg.  Pericardium: There is no pericardial effusion.    Echocardiogram (6.11.24):  The study quality is average.   Limited echo to assess left ventricular function.  Global left ventricular systolic function is normal. The left ventricular ejection fraction is 55%.     Coronary Angiogram with PCI (4.10.24):  Findings   Left main normal   Lad widely patent   Left circumflex normal   RCA reveals the 80% calcific stenosis   Summary  Patient underwent successful balloon angioplasty stenting of the proximal RCA with shockwave lithotripsy from right groin approach.    PET (3.22.24):  This is an abnormal perfusion study. Study is consistent with ischemia.   This scan is suggestive of moderate risk for future cardiovascular events.   Large partially reversible perfusion abnormality of severe intensity in the lateral region.   Perfusion imaging is suggestive of single vessel disease. Perfusion defect is in the distribution of left circumflex artery.   The left ventricular cavity is noted to be normal on the stress studies. The stress left ventricular ejection fraction was calculated to be 47% and left ventricular global function is mildly reduced. The rest left ventricular cavity is noted to be normal. The rest left ventricular ejection fraction was calculated to be 50% and rest left  ventricular global function is normal.   Persistent hypokinesis of the anterior region, septal region, inferior region and lateral region is noted in both rest and stress studies. When compared to the resting ejection fraction (50%), the stress ejection fraction (47%) has decreased.   The study quality is good.   Myocardial blood flow reserve was not performed in this patient due to specific concerns that can affect accuracy.    CUS (12.6.22):  The study quality is average.   40-59% stenosis in the mid right internal carotid artery based on Bluth Criteria.   40-59% stenosis in the mid left internal carotid artery based on Bluth Criteria.   The right vertebral artery is poorly visualized.   Antegrade left vertebral artery flow.     Peripheral angiogram 2021:  100% occluded right SFA  50% Left SFA  S/p PTA/stent R SFA and PTA of right AT artery  Left SFA stenosis to be managed medically    Review of patient's allergies indicates:   Allergen Reactions    Tirofiban      Other reaction(s): Thrombocytopenia     No current facility-administered medications on file prior to encounter.     Current Outpatient Medications on File Prior to Encounter   Medication Sig    aspirin (ECOTRIN) 81 MG EC tablet Take 81 mg by mouth once daily.    atorvastatin (LIPITOR) 40 MG tablet SMARTSI Tablet(s) By Mouth Every Evening    carvediloL (COREG) 12.5 MG tablet Take 12.5 mg by mouth 2 (two) times daily with meals.    clopidogreL (PLAVIX) 75 mg tablet Take 75 mg by mouth once daily.    gabapentin (NEURONTIN) 600 MG tablet Take 600 mg by mouth 3 (three) times daily.    pantoprazole (PROTONIX) 40 MG tablet Take 40 mg by mouth once daily.    traMADoL (ULTRAM) 50 mg tablet Take 50 mg by mouth every 6 (six) hours as needed for Pain.    [DISCONTINUED] furosemide (LASIX) 20 MG tablet Take 1 tablet (20 mg total) by mouth once daily.    [DISCONTINUED] sacubitriL-valsartan (ENTRESTO) 49-51 mg per tablet Take 1 tablet by mouth 2 (two) times  daily.    nitroGLYCERIN (NITROSTAT) 0.4 MG SL tablet Place 1 tablet (0.4 mg total) under the tongue every 5 (five) minutes as needed for Chest pain. (Patient not taking: Reported on 12/2/2024)    promethazine (PHENERGAN) 25 MG tablet Take 25 mg by mouth every 6 (six) hours as needed for Nausea.    [DISCONTINUED] hydroCHLOROthiazide (HYDRODIURIL) 25 MG tablet Take 25 mg by mouth every morning.     Review of Systems   Constitutional:  Negative for fatigue.   Respiratory:  Positive for shortness of breath (improving). Negative for chest tightness.    Cardiovascular:  Positive for leg swelling (improving). Negative for chest pain.   All other systems reviewed and are negative.    Objective:     Vital Signs (Most Recent):  Temp: 99 °F (37.2 °C) (12/04/24 1100)  Pulse: 60 (12/04/24 1100)  Resp: 16 (12/04/24 1100)  BP: (!) 103/53 (12/04/24 1100)  SpO2: 95 % (12/04/24 1100) Vital Signs (24h Range):  Temp:  [97.8 °F (36.6 °C)-99 °F (37.2 °C)] 99 °F (37.2 °C)  Pulse:  [55-79] 60  Resp:  [16-26] 16  SpO2:  [80 %-100 %] 95 %  BP: ()/(48-70) 103/53   Weight: 93.2 kg (205 lb 7.5 oz)  Body mass index is 34.19 kg/m².  SpO2: 95 %       Intake/Output Summary (Last 24 hours) at 12/4/2024 1248  Last data filed at 12/4/2024 0916  Gross per 24 hour   Intake 797 ml   Output 1750 ml   Net -953 ml     Lines/Drains/Airways       None                 Significant Labs:   Chemistries:   Recent Labs   Lab 12/02/24  0525 12/02/24  1243 12/03/24  0348 12/04/24  0356    143 145 142   K 5.5* 3.9 3.2* 3.8    104 104 102   CO2 23 29 29 30   BUN 21.2* 23.2* 23.3* 29.7*   CREATININE 1.68* 1.42* 1.26* 1.34*   CALCIUM 8.5 8.6 8.2* 8.4   BILITOT 1.0  --  0.9  --    ALKPHOS 86  --  63  --    ALT 24  --  15  --    AST 43*  --  16  --    GLUCOSE 314* 183* 120* 120*   MG 1.70  --  1.50* 2.30   TROPONINI <0.010  --   --   --         CBC/Anemia Labs: Coags:    Recent Labs   Lab 12/02/24  0525 12/03/24  0348 12/04/24  0356   WBC 11.45 5.37 4.69    HGB 12.8 11.4* 11.3*   HCT 42.0 36.2* 36.9*    185 183   MCV 87.9 86.2 86.8   RDW 17.2* 16.9 16.9    Recent Labs   Lab 12/02/24  0525   INR 1.1        Significant Imaging:  Imaging Results              X-Ray Chest AP Portable (Final result)  Result time 12/02/24 06:12:32      Final result by Shreyas Santos MD (12/02/24 06:12:32)                   Impression:      As above.      Electronically signed by: Shreyas Santos  Date:    12/02/2024  Time:    06:12               Narrative:    EXAMINATION:  XR CHEST AP PORTABLE    CLINICAL HISTORY:  Shortness of breath    TECHNIQUE:  Single view of the chest    COMPARISON:  07/14/2022    FINDINGS:  Cardiomegaly is with prominent interstitial markings greatest in the bilateral lower lobes.  Recommend further evaluation with two views of the chest.  Volume overload not excluded.                                    EKG:       Telemetry:  Sinus Tachycardia    Physical Exam  Vitals and nursing note reviewed.   Constitutional:       Appearance: Normal appearance.   HENT:      Head: Normocephalic.      Mouth/Throat:      Mouth: Mucous membranes are moist.      Pharynx: Oropharynx is clear.   Cardiovascular:      Rate and Rhythm: Normal rate and regular rhythm.      Heart sounds: Normal heart sounds.   Pulmonary:      Effort: Pulmonary effort is normal. No respiratory distress.      Comments: RA  Abdominal:      Palpations: Abdomen is soft.      Tenderness: There is no abdominal tenderness. There is no guarding.   Musculoskeletal:      Right lower leg: Edema (improving) present.      Left lower leg: Edema (improving) present.      Comments: BLE Warm   Skin:     General: Skin is warm and dry.   Neurological:      Mental Status: She is alert.   Psychiatric:         Behavior: Behavior normal.       Home Medications:   No current facility-administered medications on file prior to encounter.     Current Outpatient Medications on File Prior to Encounter   Medication Sig Dispense  Refill    aspirin (ECOTRIN) 81 MG EC tablet Take 81 mg by mouth once daily.      atorvastatin (LIPITOR) 40 MG tablet SMARTSI Tablet(s) By Mouth Every Evening      carvediloL (COREG) 12.5 MG tablet Take 12.5 mg by mouth 2 (two) times daily with meals.      clopidogreL (PLAVIX) 75 mg tablet Take 75 mg by mouth once daily.      gabapentin (NEURONTIN) 600 MG tablet Take 600 mg by mouth 3 (three) times daily.      pantoprazole (PROTONIX) 40 MG tablet Take 40 mg by mouth once daily.      traMADoL (ULTRAM) 50 mg tablet Take 50 mg by mouth every 6 (six) hours as needed for Pain.      [DISCONTINUED] furosemide (LASIX) 20 MG tablet Take 1 tablet (20 mg total) by mouth once daily. 30 tablet 0    [DISCONTINUED] sacubitriL-valsartan (ENTRESTO) 49-51 mg per tablet Take 1 tablet by mouth 2 (two) times daily. 60 tablet 0    nitroGLYCERIN (NITROSTAT) 0.4 MG SL tablet Place 1 tablet (0.4 mg total) under the tongue every 5 (five) minutes as needed for Chest pain. (Patient not taking: Reported on 2024) 30 tablet 0    promethazine (PHENERGAN) 25 MG tablet Take 25 mg by mouth every 6 (six) hours as needed for Nausea.      [DISCONTINUED] hydroCHLOROthiazide (HYDRODIURIL) 25 MG tablet Take 25 mg by mouth every morning.       Current Schedule Inpatient Medications:   aspirin  81 mg Oral Daily    atorvastatin  40 mg Oral Daily PM    carvediloL  12.5 mg Oral BID WM    clopidogreL  75 mg Oral Daily    furosemide (LASIX) injection  40 mg Intravenous Q12H    gabapentin  600 mg Oral TID    NIFEdipine  30 mg Oral Daily    pantoprazole  40 mg Oral Daily    potassium chloride  40 mEq Oral Daily       Assessment:   Acute on Chronic Systolic/Diastolic HF - Clinically Improving     - EF 45-50%, Grade 3 DD (12..24)     - EF 55% (Echo 6..24)  Hypertensive Emergency - Resolved     - History of Hypertension  CAD    - Status Post RCA Stenting (2024)    - Troponin Normal  Hyperlipidemia  DM II  Chronic Kidney Disease Stage III  PAD  Former  Smoker  Hyperkalemia (Mild)  GERD    Plan:   Pt is stable for discharge home. Case was discussed with hospital medicine.   Entresto on hold s/t hyperkalemia. Will address resumption in the outpatient setting.   Start Lasix 40 mg PO daily.   Continue ASA, statin, plavix, & BB.  Continue Procardia XL 30 mg Daily  Low Salt Diet  Repeat CMP in 3-5 days.   Hospital f/u with Dr. Davies in 1 week.         Tanya Martin, REVA  Cardiology  [facility]   Physician addendum:  I have seen and examined this patient as a split-shared visit with the ERIC d/t complicated medical management of above problems written in assessment and high acuity requiring physician expertise in medical decision-making. I performed the substantive portion of the history and exam. Above medical decision-making is also formulated by me.    Cardiovascular exam:  S1, S2  Lungs:  fine crackles at bases.  Extremities:  + edema bilaterally    Plan:  Patient is stable to discharge home.  Medications as above.  Continue supportive therapy.     Ted Nielsen MD  Cardiologist

## 2024-12-05 ENCOUNTER — PATIENT OUTREACH (OUTPATIENT)
Dept: ADMINISTRATIVE | Facility: CLINIC | Age: 89
End: 2024-12-05
Payer: MEDICARE

## 2024-12-05 LAB — POCT GLUCOSE: 118 MG/DL (ref 70–110)

## 2024-12-05 NOTE — PROGRESS NOTES
C3 nurse attempted to contact Porsha Horan  for a TCC post hospital discharge follow up call. No answer. Left voicemail with callback information. The patient has a scheduled HOSFU appointment with Jayy Allen MD on 12/11/2024; @ 10:10 AM

## 2024-12-06 NOTE — PROGRESS NOTES
3rd attempt- C3 nurse attempted to contact Porsha Horan  for a TCC post hospital discharge follow up call. No answer. No voicemail available. The patient has a scheduled HOSFU appointment with Jayy Allen MD (Family Medicine) on 12/11/2024; @ 10:10 AM

## 2024-12-06 NOTE — PROGRESS NOTES
2nd attempt- C3 nurse attempted to contact Porsha Horan  for a TCC post hospital discharge follow up call. No answer. No voicemail available. The patient has a scheduled HOSFU appointment with Jayy Allen MD (Family Medicine) on 12/11/2024; @ 10:10 AM

## 2025-09-05 ENCOUNTER — OFFICE VISIT (OUTPATIENT)
Dept: URGENT CARE | Facility: CLINIC | Age: 89
End: 2025-09-05
Payer: MEDICARE

## 2025-09-05 VITALS
SYSTOLIC BLOOD PRESSURE: 162 MMHG | DIASTOLIC BLOOD PRESSURE: 103 MMHG | HEART RATE: 120 BPM | RESPIRATION RATE: 16 BRPM | BODY MASS INDEX: 34.32 KG/M2 | HEIGHT: 65 IN | WEIGHT: 206 LBS | OXYGEN SATURATION: 98 %

## 2025-09-05 DIAGNOSIS — I48.91 ATRIAL FIBRILLATION, UNSPECIFIED TYPE: ICD-10-CM

## 2025-09-05 DIAGNOSIS — J06.9 UPPER RESPIRATORY TRACT INFECTION, UNSPECIFIED TYPE: Primary | ICD-10-CM

## 2025-09-05 DIAGNOSIS — R00.0 TACHYCARDIA: ICD-10-CM

## 2025-09-05 DIAGNOSIS — J02.9 SORE THROAT: ICD-10-CM

## 2025-09-05 LAB
ALBUMIN SERPL-MCNC: 3.3 G/DL (ref 3.4–4.8)
ALBUMIN/GLOB SERPL: 0.8 RATIO (ref 1.1–2)
ALP SERPL-CCNC: 74 UNIT/L (ref 40–150)
ALT SERPL-CCNC: 26 UNIT/L (ref 0–55)
ANION GAP SERPL CALC-SCNC: 11 MEQ/L
AST SERPL-CCNC: 21 UNIT/L (ref 11–45)
BASOPHILS # BLD AUTO: 0.03 X10(3)/MCL
BASOPHILS NFR BLD AUTO: 0.7 %
BILIRUB SERPL-MCNC: 0.8 MG/DL
BUN SERPL-MCNC: 22.5 MG/DL (ref 9.8–20.1)
CALCIUM SERPL-MCNC: 9.1 MG/DL (ref 8.4–10.2)
CHLORIDE SERPL-SCNC: 107 MMOL/L (ref 98–107)
CO2 SERPL-SCNC: 25 MMOL/L (ref 23–31)
CREAT SERPL-MCNC: 1.05 MG/DL (ref 0.55–1.02)
CREAT/UREA NIT SERPL: 21
CTP QC/QA: YES
EOSINOPHIL # BLD AUTO: 0.04 X10(3)/MCL (ref 0–0.9)
EOSINOPHIL NFR BLD AUTO: 0.9 %
ERYTHROCYTE [DISTWIDTH] IN BLOOD BY AUTOMATED COUNT: 15.5 % (ref 11.5–17)
GFR SERPLBLD CREATININE-BSD FMLA CKD-EPI: 51 ML/MIN/1.73/M2
GLOBULIN SER-MCNC: 4.4 GM/DL (ref 2.4–3.5)
GLUCOSE SERPL-MCNC: 111 MG/DL (ref 82–115)
HCT VFR BLD AUTO: 40.6 % (ref 37–47)
HGB BLD-MCNC: 12.4 G/DL (ref 12–16)
IMM GRANULOCYTES # BLD AUTO: 0.02 X10(3)/MCL (ref 0–0.04)
IMM GRANULOCYTES NFR BLD AUTO: 0.4 %
LYMPHOCYTES # BLD AUTO: 0.91 X10(3)/MCL (ref 0.6–4.6)
LYMPHOCYTES NFR BLD AUTO: 20 %
MCH RBC QN AUTO: 28.4 PG (ref 27–31)
MCHC RBC AUTO-ENTMCNC: 30.5 G/DL (ref 33–36)
MCV RBC AUTO: 92.9 FL (ref 80–94)
MONOCYTES # BLD AUTO: 0.28 X10(3)/MCL (ref 0.1–1.3)
MONOCYTES NFR BLD AUTO: 6.1 %
NEUTROPHILS # BLD AUTO: 3.28 X10(3)/MCL (ref 2.1–9.2)
NEUTROPHILS NFR BLD AUTO: 71.9 %
NRBC BLD AUTO-RTO: 0 %
PLATELET # BLD AUTO: 212 X10(3)/MCL (ref 130–400)
PMV BLD AUTO: 11.4 FL (ref 7.4–10.4)
POTASSIUM SERPL-SCNC: 3.6 MMOL/L (ref 3.5–5.1)
PROT SERPL-MCNC: 7.7 GM/DL (ref 5.8–7.6)
RBC # BLD AUTO: 4.37 X10(6)/MCL (ref 4.2–5.4)
SARS-COV+SARS-COV-2 AG RESP QL IA.RAPID: NEGATIVE
SODIUM SERPL-SCNC: 143 MMOL/L (ref 136–145)
WBC # BLD AUTO: 4.56 X10(3)/MCL (ref 4.5–11.5)

## 2025-09-05 PROCEDURE — 80053 COMPREHEN METABOLIC PANEL: CPT | Performed by: FAMILY MEDICINE

## 2025-09-05 PROCEDURE — 85025 COMPLETE CBC W/AUTO DIFF WBC: CPT | Performed by: FAMILY MEDICINE

## 2025-09-05 PROCEDURE — 99215 OFFICE O/P EST HI 40 MIN: CPT | Mod: PBBFAC | Performed by: FAMILY MEDICINE

## 2025-09-05 RX ORDER — LEVOTHYROXINE SODIUM 25 UG/1
25 TABLET ORAL EVERY MORNING
COMMUNITY
Start: 2025-06-11

## (undated) DEVICE — SHEATH INTRODUCER 6FR 11CM

## (undated) DEVICE — PAD DEFIB CADENCE ADULT R2

## (undated) DEVICE — SHEATH INTRODUCER 5FR 10CM

## (undated) DEVICE — CATH SHOCKWAVE C2+ IVL 3.5X12M

## (undated) DEVICE — COVER PROBE US 5.5X58L NON LTX

## (undated) DEVICE — KIT HAND CONTROL HIGH PRESSUR

## (undated) DEVICE — CANNULA NASAL ADULT

## (undated) DEVICE — CATH EAGLE EYE PLATINUM

## (undated) DEVICE — SET ANGIO ACIST CVI ANGIOTOUCH

## (undated) DEVICE — CONTRAST ISOVUE 370 500ML MULT

## (undated) DEVICE — Device

## (undated) DEVICE — TUBING HP AIRLSS ROT ADPT 30IN

## (undated) DEVICE — GLOVE 7.0 PROTEXIS PI MICRO

## (undated) DEVICE — VALVE CONTROL COPILOT

## (undated) DEVICE — CATH IMPULSE MP 5FR 145CM

## (undated) DEVICE — KIT MANIFOLD LOW PRESS TUBING

## (undated) DEVICE — GUIDEWIRE SION BLUE STR 190CM

## (undated) DEVICE — DEVICE BASIXCOMPAK INFL 20ML

## (undated) DEVICE — GLOVE 7.5 PROTEXIS PI MICRO

## (undated) DEVICE — GUIDE LAUNCHER 6FR JR 4.0

## (undated) DEVICE — CATH OPTITORQUE RADIAL 5FR

## (undated) DEVICE — DRESSING TEGADERM 4.4X5IN

## (undated) DEVICE — DRAPE ANGIO BRACH 38X44IN

## (undated) DEVICE — CANNULA DUAL CO2/O2 NASAL 7FT

## (undated) DEVICE — PACK OR CLEAN UP COMBO SIZE 2

## (undated) DEVICE — KIT GLIDESHEATH SLEND 6FR 10CM